# Patient Record
Sex: FEMALE | Race: WHITE | Employment: PART TIME | URBAN - METROPOLITAN AREA
[De-identification: names, ages, dates, MRNs, and addresses within clinical notes are randomized per-mention and may not be internally consistent; named-entity substitution may affect disease eponyms.]

---

## 2017-03-14 PROBLEM — G43.829 MENSTRUAL MIGRAINE WITHOUT STATUS MIGRAINOSUS, NOT INTRACTABLE: Status: ACTIVE | Noted: 2017-03-14

## 2017-03-14 PROBLEM — J30.89 PERENNIAL ALLERGIC RHINITIS: Status: ACTIVE | Noted: 2017-03-14

## 2018-01-03 PROBLEM — F33.9 RECURRENT DEPRESSION (HCC): Status: ACTIVE | Noted: 2018-01-03

## 2018-08-09 PROBLEM — F98.8 ATTENTION DEFICIT DISORDER: Status: ACTIVE | Noted: 2018-08-09

## 2019-05-19 PROBLEM — R53.81 MALAISE AND FATIGUE: Status: ACTIVE | Noted: 2019-05-19

## 2019-05-19 PROBLEM — L70.9 ACNE: Status: ACTIVE | Noted: 2019-05-19

## 2019-05-19 PROBLEM — R53.83 MALAISE AND FATIGUE: Status: ACTIVE | Noted: 2019-05-19

## 2019-05-19 PROBLEM — I83.811 VARICOSE VEINS OF LEG WITH PAIN, RIGHT: Status: ACTIVE | Noted: 2019-05-19

## 2020-12-01 PROBLEM — G43.829 MENSTRUAL MIGRAINE WITHOUT STATUS MIGRAINOSUS, NOT INTRACTABLE: Status: RESOLVED | Noted: 2017-03-14 | Resolved: 2020-12-01

## 2020-12-01 PROBLEM — Z98.891 HISTORY OF C-SECTION: Status: ACTIVE | Noted: 2020-12-01

## 2020-12-01 PROBLEM — J30.89 PERENNIAL ALLERGIC RHINITIS: Status: RESOLVED | Noted: 2017-03-14 | Resolved: 2020-12-01

## 2020-12-01 PROBLEM — F98.8 ATTENTION DEFICIT DISORDER: Status: RESOLVED | Noted: 2018-08-09 | Resolved: 2020-12-01

## 2020-12-01 PROBLEM — L70.9 ACNE: Status: RESOLVED | Noted: 2019-05-19 | Resolved: 2020-12-01

## 2020-12-01 PROBLEM — F33.9 RECURRENT DEPRESSION (HCC): Status: RESOLVED | Noted: 2018-01-03 | Resolved: 2020-12-01

## 2020-12-01 PROBLEM — Z34.90 PREGNANCY: Status: ACTIVE | Noted: 2020-12-01

## 2020-12-01 PROBLEM — Z86.59 HISTORY OF DEPRESSION: Status: ACTIVE | Noted: 2020-12-01

## 2020-12-01 PROBLEM — R53.83 MALAISE AND FATIGUE: Status: RESOLVED | Noted: 2019-05-19 | Resolved: 2020-12-01

## 2020-12-01 PROBLEM — O10.919 CHRONIC HYPERTENSION AFFECTING PREGNANCY: Status: ACTIVE | Noted: 2020-12-01

## 2020-12-01 PROBLEM — I83.811 VARICOSE VEINS OF LEG WITH PAIN, RIGHT: Status: RESOLVED | Noted: 2019-05-19 | Resolved: 2020-12-01

## 2020-12-01 PROBLEM — R53.81 MALAISE AND FATIGUE: Status: RESOLVED | Noted: 2019-05-19 | Resolved: 2020-12-01

## 2020-12-02 PROBLEM — Z87.59 HISTORY OF CHOLESTASIS DURING PREGNANCY: Status: ACTIVE | Noted: 2020-12-02

## 2020-12-02 PROBLEM — Z87.19 HISTORY OF CHOLESTASIS DURING PREGNANCY: Status: ACTIVE | Noted: 2020-12-02

## 2021-02-15 PROBLEM — O34.219 PREVIOUS CESAREAN SECTION COMPLICATING PREGNANCY: Status: ACTIVE | Noted: 2020-12-01

## 2021-02-15 PROBLEM — O99.342 DEPRESSION AFFECTING PREGNANCY IN SECOND TRIMESTER, ANTEPARTUM: Status: ACTIVE | Noted: 2020-12-01

## 2021-02-15 PROBLEM — O09.92 HIGH-RISK PREGNANCY IN SECOND TRIMESTER: Status: ACTIVE | Noted: 2020-12-01

## 2021-02-15 PROBLEM — O26.612 CHOLESTASIS DURING PREGNANCY IN SECOND TRIMESTER: Status: ACTIVE | Noted: 2020-12-02

## 2021-02-15 PROBLEM — O10.012 CHRONIC BENIGN ESSENTIAL HYPERTENSION IN SECOND TRIMESTER: Status: ACTIVE | Noted: 2020-12-01

## 2021-02-15 PROBLEM — K83.1 CHOLESTASIS DURING PREGNANCY IN SECOND TRIMESTER: Status: ACTIVE | Noted: 2020-12-02

## 2021-02-15 PROBLEM — F32.A DEPRESSION AFFECTING PREGNANCY IN SECOND TRIMESTER, ANTEPARTUM: Status: ACTIVE | Noted: 2020-12-01

## 2021-03-24 PROBLEM — Z3A.25 25 WEEKS GESTATION OF PREGNANCY: Status: ACTIVE | Noted: 2021-03-24

## 2021-03-24 PROBLEM — O99.212 OBESITY AFFECTING PREGNANCY IN SECOND TRIMESTER: Status: ACTIVE | Noted: 2021-03-24

## 2021-03-25 PROBLEM — O09.892 HISTORY OF PRETERM DELIVERY, CURRENTLY PREGNANT IN SECOND TRIMESTER: Status: ACTIVE | Noted: 2021-03-25

## 2021-03-25 PROBLEM — Z87.59 HISTORY OF TWIN PREGNANCY IN PRIOR PREGNANCY: Status: ACTIVE | Noted: 2021-03-25

## 2021-04-27 PROBLEM — Z3A.25 25 WEEKS GESTATION OF PREGNANCY: Status: RESOLVED | Noted: 2021-03-24 | Resolved: 2021-04-27

## 2021-04-29 PROBLEM — O99.019 ANEMIA IN PREGNANCY: Status: ACTIVE | Noted: 2021-04-29

## 2021-06-08 ENCOUNTER — HOSPITAL ENCOUNTER (INPATIENT)
Age: 31
LOS: 11 days | Discharge: HOME | End: 2021-06-20
Attending: OBSTETRICS & GYNECOLOGY | Admitting: OBSTETRICS & GYNECOLOGY
Payer: COMMERCIAL

## 2021-06-08 DIAGNOSIS — O14.93 PRE-ECLAMPSIA IN THIRD TRIMESTER: ICD-10-CM

## 2021-06-08 DIAGNOSIS — O10.012 CHRONIC BENIGN ESSENTIAL HYPERTENSION IN SECOND TRIMESTER: ICD-10-CM

## 2021-06-08 DIAGNOSIS — O34.219 PREVIOUS CESAREAN SECTION COMPLICATING PREGNANCY: ICD-10-CM

## 2021-06-08 PROBLEM — O14.90 PREECLAMPSIA: Status: ACTIVE | Noted: 2021-06-08

## 2021-06-08 LAB
ALBUMIN SERPL-MCNC: 2.4 G/DL (ref 3.5–5)
ALBUMIN/GLOB SERPL: 0.5 {RATIO} (ref 1.2–3.5)
ALP SERPL-CCNC: 138 U/L (ref 50–136)
ALT SERPL-CCNC: 21 U/L (ref 12–65)
ANION GAP SERPL CALC-SCNC: 7 MMOL/L (ref 7–16)
AST SERPL-CCNC: 17 U/L (ref 15–37)
BILIRUB SERPL-MCNC: 0.3 MG/DL (ref 0.2–1.1)
BUN SERPL-MCNC: 13 MG/DL (ref 6–23)
CALCIUM SERPL-MCNC: 8.9 MG/DL (ref 8.3–10.4)
CHLORIDE SERPL-SCNC: 107 MMOL/L (ref 98–107)
CO2 SERPL-SCNC: 22 MMOL/L (ref 21–32)
CREAT SERPL-MCNC: 0.78 MG/DL (ref 0.6–1)
CREAT UR-MCNC: 171 MG/DL
ERYTHROCYTE [DISTWIDTH] IN BLOOD BY AUTOMATED COUNT: 15.6 % (ref 11.9–14.6)
GLOBULIN SER CALC-MCNC: 4.4 G/DL (ref 2.3–3.5)
GLUCOSE SERPL-MCNC: 88 MG/DL (ref 65–100)
HCT VFR BLD AUTO: 26.4 % (ref 35.8–46.3)
HGB BLD-MCNC: 7.8 G/DL (ref 11.7–15.4)
LDH SERPL L TO P-CCNC: 197 U/L (ref 100–190)
MCH RBC QN AUTO: 22.9 PG (ref 26.1–32.9)
MCHC RBC AUTO-ENTMCNC: 29.5 G/DL (ref 31.4–35)
MCV RBC AUTO: 77.6 FL (ref 79.6–97.8)
NRBC # BLD: 0.02 K/UL (ref 0–0.2)
PLATELET # BLD AUTO: 256 K/UL (ref 150–450)
PMV BLD AUTO: 11.4 FL (ref 9.4–12.3)
POTASSIUM SERPL-SCNC: 4.3 MMOL/L (ref 3.5–5.1)
PROT SERPL-MCNC: 6.8 G/DL (ref 6.3–8.2)
PROT UR-MCNC: 64 MG/DL
PROT/CREAT UR-RTO: 0.4
RBC # BLD AUTO: 3.4 M/UL (ref 4.05–5.2)
SODIUM SERPL-SCNC: 136 MMOL/L (ref 136–145)
URATE SERPL-MCNC: 5.8 MG/DL (ref 2.6–6)
WBC # BLD AUTO: 12 K/UL (ref 4.3–11.1)

## 2021-06-08 PROCEDURE — 74011250637 HC RX REV CODE- 250/637: Performed by: OBSTETRICS & GYNECOLOGY

## 2021-06-08 PROCEDURE — 83615 LACTATE (LD) (LDH) ENZYME: CPT

## 2021-06-08 PROCEDURE — 96374 THER/PROPH/DIAG INJ IV PUSH: CPT

## 2021-06-08 PROCEDURE — 84156 ASSAY OF PROTEIN URINE: CPT

## 2021-06-08 PROCEDURE — 99219 PR INITIAL OBSERVATION CARE/DAY 50 MINUTES: CPT | Performed by: OBSTETRICS & GYNECOLOGY

## 2021-06-08 PROCEDURE — 80053 COMPREHEN METABOLIC PANEL: CPT

## 2021-06-08 PROCEDURE — 99218 HC RM OBSERVATION: CPT

## 2021-06-08 PROCEDURE — 82570 ASSAY OF URINE CREATININE: CPT

## 2021-06-08 PROCEDURE — 74011000258 HC RX REV CODE- 258: Performed by: OBSTETRICS & GYNECOLOGY

## 2021-06-08 PROCEDURE — 96372 THER/PROPH/DIAG INJ SC/IM: CPT

## 2021-06-08 PROCEDURE — 74011250636 HC RX REV CODE- 250/636: Performed by: OBSTETRICS & GYNECOLOGY

## 2021-06-08 PROCEDURE — 84550 ASSAY OF BLOOD/URIC ACID: CPT

## 2021-06-08 PROCEDURE — 99284 EMERGENCY DEPT VISIT MOD MDM: CPT

## 2021-06-08 PROCEDURE — 85027 COMPLETE CBC AUTOMATED: CPT

## 2021-06-08 PROCEDURE — 59025 FETAL NON-STRESS TEST: CPT

## 2021-06-08 RX ORDER — BETAMETHASONE SODIUM PHOSPHATE AND BETAMETHASONE ACETATE 3; 3 MG/ML; MG/ML
12 INJECTION, SUSPENSION INTRA-ARTICULAR; INTRALESIONAL; INTRAMUSCULAR; SOFT TISSUE EVERY 24 HOURS
Status: COMPLETED | OUTPATIENT
Start: 2021-06-08 | End: 2021-06-09

## 2021-06-08 RX ORDER — LABETALOL 100 MG/1
100 TABLET, FILM COATED ORAL EVERY 8 HOURS
Status: DISCONTINUED | OUTPATIENT
Start: 2021-06-08 | End: 2021-06-16

## 2021-06-08 RX ADMIN — LABETALOL HYDROCHLORIDE 100 MG: 100 TABLET, FILM COATED ORAL at 21:36

## 2021-06-08 RX ADMIN — LABETALOL HYDROCHLORIDE 100 MG: 100 TABLET, FILM COATED ORAL at 14:04

## 2021-06-08 RX ADMIN — BETAMETHASONE SODIUM PHOSPHATE AND BETAMETHASONE ACETATE 12 MG: 3; 3 INJECTION, SUSPENSION INTRA-ARTICULAR; INTRALESIONAL; INTRAMUSCULAR at 14:05

## 2021-06-08 RX ADMIN — IRON SUCROSE 200 MG: 20 INJECTION, SOLUTION INTRAVENOUS at 14:33

## 2021-06-08 NOTE — PROGRESS NOTES
14:04 Medication Given labetaloL (NORMODYNE) tablet 100 mg -  Dose: 100 mg ; Route: Oral ; Scheduled Time: 1400

## 2021-06-08 NOTE — PROGRESS NOTES
Pt to room 439 for triage. Sent from [facility] office for NST, labs and BP's. Assessment begins, EFM and Murdo applied to a soft non tender abdomen and tracing well.

## 2021-06-08 NOTE — PROGRESS NOTES
06/08/21 1230   Fetal Vital Signs   Mode External   Fetal Heart Rate 115   Fetal Activity Present   Variability 6-25 BPM   Decelerations None   Accelerations Yes   RN Reviewed Strip?  Yes   Non Stress Test Reactive   Uterine Activity   Mode External   Frequency (min) 0

## 2021-06-08 NOTE — H&P
History & Physical    Name: Adrien Felix MRN: [id number]  SSN: [id number]    YOB: 1990  Age: 35 y.o. Sex: female      Subjective:     Reason for Admission:  Pregnancy and Chronic Hypertension    History of Present Illness: Ms. Chayo Benavides is a 35 y.o.  female with an estimated gestational age of 26w5d with Estimated Date of Delivery: 21. Patient was seen in the office and had elevated blood pressure 150/90's. She denies pree symptoms. She currently takes labetalol 100mg bid. Had us on  with normally grown baby.       OB History    Para Term  AB Living   2 1 0 1 0 2   SAB TAB Ectopic Molar Multiple Live Births   0 0 0   1 2      # Outcome Date GA Lbr Olaf/2nd Weight Sex Delivery Anes PTL Lv   2 Current            1A  10/28/13 35w6d  6 lb 8.8 oz (2.97 kg) M  LO [facility]   1B  10/28/13 35w6d  5 lb 14.5 oz (2.68 kg) F  LO [facility]     Past Medical History:   Diagnosis Date    ADHD     Anemia     Depression 2016    Hypertension     Polycystic disease, ovaries      delivery     Recurrent UTI      Past Surgical History:   Procedure Laterality Date    HX  SECTION      c section     Social History     Occupational History     Employer: NOT EMPLOYED   Tobacco Use    Smoking status: Never Smoker    Smokeless tobacco: Never Used   Vaping Use    Vaping Use: Never used   Substance and Sexual Activity    Alcohol use: No    Drug use: No    Sexual activity: Yes     Partners: Male     Birth control/protection: None      Family History   Problem Relation Age of Onset    Hypertension Mother     Hypertension Maternal Grandmother     Heart Disease Maternal Grandmother     Hypertension Maternal Grandfather     Heart Disease Maternal Grandfather     No Known Problems Father        Allergies   Allergen Reactions    Ceclor [Cefaclor] Unable to Obtain    Sulfa (Sulfonamide Antibiotics) Unknown (comments)     Prior to Admission medications    Medication Sig Start Date End Date Taking? Authorizing Provider   labetaloL (NORMODYNE) 100 mg tablet TAKE 1 TABLET BY MOUTH TWICE A DAY 21  Yes Gerald Gaffney MD   fluconazole (Diflucan) 150 mg tablet 1 tablet po now and 1 tablet po in 3-4 days 21   Stella Ann,    buPROPion SR (Wellbutrin SR) 150 mg SR tablet Take  by mouth two (2) times a day. Provider, Historical   hydrocortisone (ANUSOL-HC) 2.5 % rectal cream Insert  into rectum four (4) times daily. 21   Xavier Joshua MD   ferrous sulfate (Iron) 325 mg (65 mg iron) tablet Take  by mouth Daily (before breakfast). Provider, Historical   CALCIUM PO Take  by mouth. Provider, Historical   cholecalciferol, vitamin D3, (Vitamin D3) 50 mcg (2,000 unit) tab Take  by mouth. Provider, Historical   calcium carbonate (TUMS) 200 mg calcium (500 mg) chew Take 1 Tab by mouth daily. Provider, Historical   prenatal vit,darrell 74/iron/folic (PRENATAL VITAMIN 1+1 PO) Take  by mouth. Provider, Historical        Review of Systems:  A comprehensive review of systems was negative except for that written in the History of Present Illness. Objective:     Vitals:    Vitals:    21 1218 21 1232 21 1247 21 1303   BP: (!) 158/93 (!) 142/76 138/76 133/69   Pulse: 86 82 86 82   Resp:       Temp:       Height:          Temp (24hrs), Av.1 °F (36.7 °C), Min:98.1 °F (36.7 °C), Max:98.1 °F (36.7 °C)    BP  Min: 133/69  Max: 158/93     Physical Exam:  General: well nourished well developed female in nad   Heart rrr  Lungs cta b&s   Abdomen soft nontender, no ruq pain  Extremity:  +2 tibial edema.   dtr +1/ +4  Fetal Heart Rate:  Reactive       Lab/Data Review:  Recent Results (from the past 24 hour(s))   AMB POC OB URINE DIP    Collection Time: 21  9:31 AM   Result Value Ref Range    Glucose (UA POC) Negative Negative    Protein (UA POC) 1+ Negative   CBC W/O DIFF    Collection Time: 06/08/21 12:08 PM   Result Value Ref Range    WBC 12.0 (H) 4.3 - 11.1 K/uL    RBC 3.40 (L) 4.05 - 5.2 M/uL    HGB 7.8 (L) 11.7 - 15.4 g/dL    HCT 26.4 (L) 35.8 - 46.3 %    MCV 77.6 (L) 79.6 - 97.8 FL    MCH 22.9 (L) 26.1 - 32.9 PG    MCHC 29.5 (L) 31.4 - 35.0 g/dL    RDW 15.6 (H) 11.9 - 14.6 %    PLATELET 941 295 - 461 K/uL    MPV 11.4 9.4 - 12.3 FL    ABSOLUTE NRBC 0.02 0.0 - 0.2 K/uL   METABOLIC PANEL, COMPREHENSIVE    Collection Time: 06/08/21 12:08 PM   Result Value Ref Range    Sodium 136 136 - 145 mmol/L    Potassium 4.3 3.5 - 5.1 mmol/L    Chloride 107 98 - 107 mmol/L    CO2 22 21 - 32 mmol/L    Anion gap 7 7 - 16 mmol/L    Glucose 88 65 - 100 mg/dL    BUN 13 6 - 23 MG/DL    Creatinine 0.78 0.6 - 1.0 MG/DL    GFR est AA >60 >60 ml/min/1.73m2    GFR est non-AA >60 >60 ml/min/1.73m2    Calcium 8.9 8.3 - 10.4 MG/DL    Bilirubin, total 0.3 0.2 - 1.1 MG/DL    ALT (SGPT) 21 12 - 65 U/L    AST (SGOT) 17 15 - 37 U/L    Alk. phosphatase 138 (H) 50 - 136 U/L    Protein, total 6.8 6.3 - 8.2 g/dL    Albumin 2.4 (L) 3.5 - 5.0 g/dL    Globulin 4.4 (H) 2.3 - 3.5 g/dL    A-G Ratio 0.5 (L) 1.2 - 3.5     URIC ACID    Collection Time: 06/08/21 12:08 PM   Result Value Ref Range    Uric acid 5.8 2.6 - 6.0 MG/DL   LD    Collection Time: 06/08/21 12:08 PM   Result Value Ref Range     (H) 100 - 190 U/L   PROTEIN/CREATININE RATIO, URINE    Collection Time: 06/08/21 12:08 PM   Result Value Ref Range    Protein, urine random 64 mg/dL    Creatinine, urine 171.00 mg/dL    Protein/Creat. urine Ratio 0.4         Assessment and Plan:     Principal Problem:    Chronic benign essential hypertension in second trimester (12/1/2020)      Overview: Taking Labetalol 100 mg bid. Baseline labs-      24 hour urine-            2/16/2021 at [facility]: Takes Labetalol 100mg BID. /88. Denies PreE       symptoms      2/18/21-Pre-E labs P/C ratio-208-per Alt collect 24 hr urine at next       visit.       3/25/2021 at [facility]: Appropriate fetal growth, Normal anatomy/echo; AC 77%,       Overall 65%, SHAYY 24.5 cm (DVP 8 cm).      21-Pre-E labs P/C ratio-204       No f/u with MFM, refer back PRN. · Growth at 32 weeks with primary OB. · Start twice weekly testing with primary OB at 32 weeks. · Get baseline preeclamptic labs in your office at next appointment:        (CBC, CMP, LDH, Uric Acid); also do P/C ratio (if elevated, need to then       confirm with 24h urine) or 24 hour urine (for total protein and creatinine       clearance). Repeat for elevated BP or symptoms. · Preeclamptic warnings given      · Treatment of chronic HTN is recommended to maintain blood pressure in       normal to mild range (<160/<110). Increase Labetalol to 200mg BID for       >150/100. · Low dose Aspirin (162 mg daily qhs) is recommended to be started by       12-16 weeks for the prevention of preeclampsia in high risk women; some       benefit seen with starting up to 28 weeks. · Will need serial growth ultrasounds in third trimester, as well as        testing to monitor maternal and fetal well-being             CHTN with now likely superimposed pree. Urine protein  / creatinine ratio 0.4. Per mfm, will admit, steroids, change labetalol to 100mg tid, collect 24 hour urine. Will likely remain inhouse until 37 week delivery. She will be repeat ltcs. Severe anemia - iv iron here 2 doses x 24 hours.

## 2021-06-09 PROBLEM — O14.90 PRE-ECLAMPSIA: Status: ACTIVE | Noted: 2021-06-09

## 2021-06-09 LAB
COLLECT DURATION TIME UR: 24 HR
PROT 24H UR-MRATE: 646 MG/24HR
PROT UR-MCNC: 38 MG/DL
SPECIMEN VOL ?TM UR: 1700 ML

## 2021-06-09 PROCEDURE — 74011250637 HC RX REV CODE- 250/637: Performed by: OBSTETRICS & GYNECOLOGY

## 2021-06-09 PROCEDURE — 74011250636 HC RX REV CODE- 250/636: Performed by: OBSTETRICS & GYNECOLOGY

## 2021-06-09 PROCEDURE — 74011000258 HC RX REV CODE- 258: Performed by: OBSTETRICS & GYNECOLOGY

## 2021-06-09 PROCEDURE — 59025 FETAL NON-STRESS TEST: CPT | Performed by: OBSTETRICS & GYNECOLOGY

## 2021-06-09 PROCEDURE — 99218 HC RM OBSERVATION: CPT

## 2021-06-09 PROCEDURE — 96376 TX/PRO/DX INJ SAME DRUG ADON: CPT

## 2021-06-09 PROCEDURE — 96372 THER/PROPH/DIAG INJ SC/IM: CPT

## 2021-06-09 PROCEDURE — 65270000029 HC RM PRIVATE

## 2021-06-09 PROCEDURE — 99232 SBSQ HOSP IP/OBS MODERATE 35: CPT | Performed by: OBSTETRICS & GYNECOLOGY

## 2021-06-09 RX ORDER — FAMOTIDINE 20 MG/1
20 TABLET, FILM COATED ORAL DAILY
Status: DISCONTINUED | OUTPATIENT
Start: 2021-06-09 | End: 2021-06-09

## 2021-06-09 RX ORDER — ACETAMINOPHEN 325 MG/1
650 TABLET ORAL ONCE
Status: COMPLETED | OUTPATIENT
Start: 2021-06-09 | End: 2021-06-09

## 2021-06-09 RX ORDER — FAMOTIDINE 20 MG/1
20 TABLET, FILM COATED ORAL 2 TIMES DAILY
Status: DISCONTINUED | OUTPATIENT
Start: 2021-06-10 | End: 2021-06-16

## 2021-06-09 RX ORDER — CALCIUM CARBONATE 200(500)MG
400 TABLET,CHEWABLE ORAL AS NEEDED
Status: DISCONTINUED | OUTPATIENT
Start: 2021-06-09 | End: 2021-06-16

## 2021-06-09 RX ORDER — FAMOTIDINE 20 MG/1
TABLET, FILM COATED ORAL
Status: ACTIVE
Start: 2021-06-09 | End: 2021-06-09

## 2021-06-09 RX ADMIN — LABETALOL HYDROCHLORIDE 100 MG: 100 TABLET, FILM COATED ORAL at 22:08

## 2021-06-09 RX ADMIN — BETAMETHASONE SODIUM PHOSPHATE AND BETAMETHASONE ACETATE 12 MG: 3; 3 INJECTION, SUSPENSION INTRA-ARTICULAR; INTRALESIONAL; INTRAMUSCULAR at 13:42

## 2021-06-09 RX ADMIN — IRON SUCROSE 200 MG: 20 INJECTION, SOLUTION INTRAVENOUS at 14:24

## 2021-06-09 RX ADMIN — LABETALOL HYDROCHLORIDE 100 MG: 100 TABLET, FILM COATED ORAL at 13:41

## 2021-06-09 RX ADMIN — ACETAMINOPHEN 650 MG: 325 TABLET ORAL at 10:07

## 2021-06-09 RX ADMIN — FAMOTIDINE 20 MG: 20 TABLET, FILM COATED ORAL at 05:49

## 2021-06-09 RX ADMIN — LABETALOL HYDROCHLORIDE 100 MG: 100 TABLET, FILM COATED ORAL at 05:49

## 2021-06-09 NOTE — PROGRESS NOTES
Spoke to Dr. Ivelisse Lu regarding patient's complaint of headache, one time dose of tylenol ordered.

## 2021-06-09 NOTE — PROGRESS NOTES
Chart reviewed - Patient admitted on 6/8/21 at 35w6d for preeclampsia work-up. DENEEN: 7/7/21. Per chart, patient will remain inpatient until 37 weeks gestation. SW will continue to follow.     SHERIN Zheng-STEPHENIE  [facility]   [phone]

## 2021-06-09 NOTE — CONSULTS
Neonatology Prenatal Consult:    Prenatal Consult was requested by the obstetrician. Obstetrical Note:  Medical record and notes reviewed. Obstetrical Findings: Mother's Date of Admission: 2021 11:15 AM   Age: 35 y.o.  DENEEN: Estimated Date of Delivery: 21  Gestation by dates: 36w0d   Pregnancy:   Membrane status: Membrane Status: Intact      Social History     Socioeconomic History    Marital status:      Spouse name: Not on file    Number of children: Not on file    Years of education: Not on file    Highest education level: Not on file   Occupational History     Employer: NOT EMPLOYED   Tobacco Use    Smoking status: Never Smoker    Smokeless tobacco: Never Used   Vaping Use    Vaping Use: Never used   Substance and Sexual Activity    Alcohol use: No    Drug use: No    Sexual activity: Yes     Partners: Male     Birth control/protection: None   Other Topics Concern     Social Determinants of Health     Financial Resource Strain:     Difficulty of Paying Living Expenses:    Food Insecurity:     Worried About Running Out of Food in the Last Year:     Ran Out of Food in the Last Year:    Transportation Needs:     Lack of Transportation (Medical):      Lack of Transportation (Non-Medical):    Physical Activity:     Days of Exercise per Week:     Minutes of Exercise per Session:    Stress:     Feeling of Stress :    Social Connections:     Frequency of Communication with Friends and Family:     Frequency of Social Gatherings with Friends and Family:     Attends Adventism Services:     Active Member of Clubs or Organizations:     Attends Club or Organization Meetings:     Marital Status:      Current Facility-Administered Medications   Medication Dose Route Frequency    famotidine (PEPCID) tablet 20 mg  20 mg Oral DAILY    labetaloL (NORMODYNE) tablet 100 mg  100 mg Oral Q8H     Patient Active Problem List    Diagnosis Date Noted    Pre-eclampsia 2021  Preeclampsia 2021    Anemia in pregnancy 2021    History of  delivery, currently pregnant in second trimester 2021    History of twin pregnancy in prior pregnancy 2021    Obesity affecting pregnancy in second trimester 2021    BMI 36.0-36.9,adult 2021    History of cholestasis during pregnancy 2020    High-risk pregnancy in second trimester 2020    Previous  section complicating pregnancy     Chronic benign essential hypertension in second trimester 2020    Depression affecting pregnancy in second trimester, antepartum 2020       Lab Results   Component Value Date/Time    ABO/Rh(D) B POS 10/27/2013 10:30 PM    Antibody screen Negative 2020 02:15 PM    Antibody screen, External Negative 2020 12:00 AM    HBsAg, External Negative 2020 12:00 AM    HIV, External Non-Reactive 2020 12:00 AM    Rubella, External Immune 2020 12:00 AM    RPR, External Non-Reactive 2020 12:00 AM    ABO,Rh B positive 2020 12:00 AM        Items below were discussed with the parents:      Neonatology coverage reviewed. Survival is very good to excellent. This improves with advancing gestational age. Expected LOS, dependent on gestational age and maturity skills reviewed.  resuscitation team attendance reviewed. Admissions procedures were explained. Family visitation policy were explained. RDS, at some risk. This risk decreases with advancing gestational age. Management of RDS was reviewed. Sepsis evaluation was explained. Feeding techniques reviewed. Mother plans to provide breast milk, yes. The benefits of breast milk were discussed in detail, and breast milk feedings is strongly recommended. Lactation services were also reviewed. Gut problems, NEC and infections are lessened with breast milk feedings.   Significant IVH and ROP are at a low risk as compared to the extreme  gestation. This risk decreases with advancing gestational age. School readiness and learning problems are at a low but increased risk as compared to the full term gestation. Asthma-like problems later in life is at a low but increased risk as compared to term gestation. An increased risk is found with a family history or with smoking. Significant morbidity or complications are a low occurrence for a \"late \" , but with the potential for an extended hospital stay because of temperature maintenance and poor feeding skills. If male , have discussed potential for circumcision. Risks and benefits explained. Family advised to think carefully about this procedure before consenting. SIDS and safe infant sleep practices were reviewed. Family advised to maintain their flu and pertussis vaccinations. Local or primary pediatrician: to be determined. Recommendations: The state  regionalization guidelines were reviewed. The timing and place of delivery is determined by the obstetrical staff. The subsequent appropriate level of  care is determined by the  staff. It is appropriate for the infant to be receive care here in our  Care Unit, if after evaluation of the  infant, it is determined that greater than 32 weeks gestation and greater than 1500 gm, or that a higher level of care is not required, or with consultation with the level III  C. If infant needs NCU care, then will plan to admit to the neonatology service. Attestation:     Total consultation time was 40 minutes with over 50% of the total time was spent in counseling or coordination of care. This included prognosis, risks and benefits of management (treatment) options, importance of compliance with chosen management (treatment) options, and patient and family education.         Signed: Willian Mayo MD  Today's Date: 2021

## 2021-06-09 NOTE — PROGRESS NOTES
24 hr urine 646 mg protein   Discussed with patient with plan for in hospital with delivery 37 weeks or if severe symptoms. Questions answered.

## 2021-06-09 NOTE — PROGRESS NOTES
High Risk Obstetrics Progress Note    Name: Bharat Camargo MRN: [id number]  SSN: [id number]    YOB: 1990  Age: 35 y.o. Sex: female      Subjective:      LOS: 1 day    Estimated Date of Delivery: 21   Gestational Age Today: 36w0d     Patient admitted for preeclampsia. States she does have very mild headache  and normal fetal movement and does not have abdominal pain  , chest pain, nausea and vomiting, right upper quadrant pain  , shortness of breath, vaginal bleeding , vaginal leaking of fluid  and visual disturbances. Objective:     Vitals:  Blood pressure 131/68, pulse (!) 102, temperature 98.3 °F (36.8 °C), resp. rate 18, height 5' 8\" (1.727 m), last menstrual period 2020. Temp (24hrs), Av.2 °F (36.8 °C), Min:98.1 °F (36.7 °C), Max:98.3 °F (55.1 °C)    Systolic (45QFX), BAP:827 , Min:120 , FXE:983      Diastolic (67ZRJ), CIP:90, Min:68, Max:93       Intake and Output:     Date 21 0700 - 06/10/21 0659   Shift 8505-9827 1366-0341 2772-9557 24 Hour Total   INTAKE   P.O. 1340   1340   Shift Total 1340   1340   OUTPUT   Urine 1600   1600   Shift Total 1600   1600   Weight (kg)           Physical Exam:  Patient without distress.   Abdomen: soft, nontender       Membranes:  Intact    Uterine Activity:  None    Fetal Heart Rate:  Reactive        Labs:   Recent Results (from the past 36 hour(s))   AMB POC OB URINE DIP    Collection Time: 21  9:31 AM   Result Value Ref Range    Glucose (UA POC) Negative Negative    Protein (UA POC) 1+ Negative   CBC W/O DIFF    Collection Time: 21 12:08 PM   Result Value Ref Range    WBC 12.0 (H) 4.3 - 11.1 K/uL    RBC 3.40 (L) 4.05 - 5.2 M/uL    HGB 7.8 (L) 11.7 - 15.4 g/dL    HCT 26.4 (L) 35.8 - 46.3 %    MCV 77.6 (L) 79.6 - 97.8 FL    MCH 22.9 (L) 26.1 - 32.9 PG    MCHC 29.5 (L) 31.4 - 35.0 g/dL    RDW 15.6 (H) 11.9 - 14.6 %    PLATELET 140 679 - 318 K/uL    MPV 11.4 9.4 - 12.3 FL    ABSOLUTE NRBC 0.02 0.0 - 0.2 K/uL   METABOLIC PANEL, COMPREHENSIVE    Collection Time: 06/08/21 12:08 PM   Result Value Ref Range    Sodium 136 136 - 145 mmol/L    Potassium 4.3 3.5 - 5.1 mmol/L    Chloride 107 98 - 107 mmol/L    CO2 22 21 - 32 mmol/L    Anion gap 7 7 - 16 mmol/L    Glucose 88 65 - 100 mg/dL    BUN 13 6 - 23 MG/DL    Creatinine 0.78 0.6 - 1.0 MG/DL    GFR est AA >60 >60 ml/min/1.73m2    GFR est non-AA >60 >60 ml/min/1.73m2    Calcium 8.9 8.3 - 10.4 MG/DL    Bilirubin, total 0.3 0.2 - 1.1 MG/DL    ALT (SGPT) 21 12 - 65 U/L    AST (SGOT) 17 15 - 37 U/L    Alk. phosphatase 138 (H) 50 - 136 U/L    Protein, total 6.8 6.3 - 8.2 g/dL    Albumin 2.4 (L) 3.5 - 5.0 g/dL    Globulin 4.4 (H) 2.3 - 3.5 g/dL    A-G Ratio 0.5 (L) 1.2 - 3.5     URIC ACID    Collection Time: 06/08/21 12:08 PM   Result Value Ref Range    Uric acid 5.8 2.6 - 6.0 MG/DL   LD    Collection Time: 06/08/21 12:08 PM   Result Value Ref Range     (H) 100 - 190 U/L   PROTEIN/CREATININE RATIO, URINE    Collection Time: 06/08/21 12:08 PM   Result Value Ref Range    Protein, urine random 64 mg/dL    Creatinine, urine 171.00 mg/dL    Protein/Creat. urine Ratio 0.4         Assessment and Plan:      Principal Problem:    Chronic benign essential hypertension in second trimester (12/1/2020)      Overview: Taking Labetalol 100 mg bid. Baseline labs-      24 hour urine-            2/16/2021 at [facility]: Takes Labetalol 100mg BID. /88. Denies PreE       symptoms      2/18/21-Pre-E labs P/C ratio-208-per Alt collect 24 hr urine at next       visit. 3/25/2021 at [facility]: Appropriate fetal growth, Normal anatomy/echo; AC 77%,       Overall 65%, SHAYY 24.5 cm (DVP 8 cm).      4/27/21-Pre-E labs P/C ratio-204       No f/u with MFM, refer back PRN. · Growth at 32 weeks with primary OB. · Start twice weekly testing with primary OB at 32 weeks.       · Get baseline preeclamptic labs in your office at next appointment:        (CBC, CMP, LDH, Uric Acid); also do P/C ratio (if elevated, need to then       confirm with 24h urine) or 24 hour urine (for total protein and creatinine       clearance). Repeat for elevated BP or symptoms. · Preeclamptic warnings given      · Treatment of chronic HTN is recommended to maintain blood pressure in       normal to mild range (<160/<110). Increase Labetalol to 200mg BID for       >150/100. · Low dose Aspirin (162 mg daily qhs) is recommended to be started by       12-16 weeks for the prevention of preeclampsia in high risk women; some       benefit seen with starting up to 28 weeks. · Will need serial growth ultrasounds in third trimester, as well as        testing to monitor maternal and fetal well-being          Active Problems:    Preeclampsia (2021)       CHTN with now likely superimposed pree. Urine protein  / creatinine ratio 0.4. Per mfm, steroids, change labetalol to 100mg tid, collect 24 hour urine. Will likely remain inhouse until 37 week delivery. She will be repeat ltcs.    Severe anemia - iv iron here 2 doses x 24 hours

## 2021-06-10 PROCEDURE — 74011250637 HC RX REV CODE- 250/637: Performed by: OBSTETRICS & GYNECOLOGY

## 2021-06-10 PROCEDURE — 65270000029 HC RM PRIVATE

## 2021-06-10 RX ORDER — BUPROPION HYDROCHLORIDE 150 MG/1
150 TABLET, EXTENDED RELEASE ORAL DAILY
Status: DISCONTINUED | OUTPATIENT
Start: 2021-06-10 | End: 2021-06-16

## 2021-06-10 RX ORDER — PRENATAL VIT 96/IRON FUM/FOLIC 27MG-0.8MG
1 TABLET ORAL DAILY
Status: DISCONTINUED | OUTPATIENT
Start: 2021-06-10 | End: 2021-06-16

## 2021-06-10 RX ORDER — ASCORBIC ACID 500 MG
1000 TABLET ORAL DAILY
Status: DISCONTINUED | OUTPATIENT
Start: 2021-06-10 | End: 2021-06-16

## 2021-06-10 RX ORDER — MELATONIN
2000 DAILY
Status: DISCONTINUED | OUTPATIENT
Start: 2021-06-10 | End: 2021-06-16

## 2021-06-10 RX ADMIN — CALCIUM CARBONATE 400 MG: 500 TABLET, CHEWABLE ORAL at 00:00

## 2021-06-10 RX ADMIN — VITAMIN D, TAB 1000IU (100/BT) 2000 UNITS: 25 TAB at 11:13

## 2021-06-10 RX ADMIN — LABETALOL HYDROCHLORIDE 100 MG: 100 TABLET, FILM COATED ORAL at 06:06

## 2021-06-10 RX ADMIN — FAMOTIDINE 20 MG: 20 TABLET ORAL at 18:16

## 2021-06-10 RX ADMIN — OXYCODONE HYDROCHLORIDE AND ACETAMINOPHEN 1000 MG: 500 TABLET ORAL at 11:13

## 2021-06-10 RX ADMIN — BUPROPION HYDROCHLORIDE 150 MG: 150 TABLET, EXTENDED RELEASE ORAL at 11:19

## 2021-06-10 RX ADMIN — LABETALOL HYDROCHLORIDE 100 MG: 100 TABLET, FILM COATED ORAL at 21:37

## 2021-06-10 RX ADMIN — PRENATAL VIT W/ FE FUMARATE-FA TAB 27-0.8 MG 1 TABLET: 27-0.8 TAB at 11:13

## 2021-06-10 RX ADMIN — FAMOTIDINE 20 MG: 20 TABLET ORAL at 09:43

## 2021-06-10 NOTE — PROGRESS NOTES
SW met with patient/ while social distancing w/appropriate PPE. Patient states that she's coping well with unexpected hospitalization although this \"isn't part of the plan. \"  Patient has 8 y/o boy/girl twins at home whom are being cared for by a grandparent. Patient is expecting a boy - she will have c/s in 6 days. Patient states that she's currently on Wellbutrin for \"mild\" anxiety. She's been on this medication for the past 2-3 years, and she feels that her anxiety has been managed well. Patient has not received the Wellbutrin while inpatient - SW spoke with RN who will work on getting this medication for her. Family denied any needs from  at this time. KAELA will continue to follow.     SAMIA Nelson   [phone]

## 2021-06-10 NOTE — ADT AUTH CERT NOTES
[address] [address] 
  
FACILITY NPI : [id number] TAX ID [id number] 
  
[facility] SANTOS FISHER 
SFE 4 ANTEPARTUM 
[address] [facility] [address] [phone] 
  
  
   
Patient Name :Rosalba Bobby  : 1990 (30 yrs) MRN : [id number] 
  
Patient Mailing Address [address] [facility] 
                                        [place]ace [place]mann[place] [41] , [address]      
  
  . 
  
   
Insurance Plan Payor: BLUE CROSS / [place] / Product Type: PPO /  
  
Primary Coverage Subscriber ID : N/A 
  
 
   
Current Patient Class : INPATIENT Admit Date : 2021 
  
REQUESTED LEVEL OF CARE: INPATIENT [101] OBSERVATION [104] Diagnosis : Pre-eclampsia Preeclampsia 
  
ICD10 Code : History of  section [Z98.891] Preeclampsia [O14.90] Pre-eclampsia [O14.90]   
  
Admitting and Attending Info: 
Admitting Provider : Marcos Castro MD   NPI: [id number] Admitting Provider Phone. [phone] Admitting Provider Address: [address] [place] [address] 64 43 
  
Attending Provider Maria L Dobbins MD   [id number] Attending Provider Address:  SAME AS FACILITY Facility Name: [address] [place] [address]           
  
  
  
  
  
   
Patient Demographics Patient Name Neetu Best [address] Legal Sex Female   
1990 Address [address] [place] [address] Phone [phone] (Home) *Preferred*  
[phone] [facility] Account Name Acct ID Class Status Primary Coverage Abdulkadir Cleary [id number] INPATIENT Open BLUE [place]OSS - [place] [employer] [facility]  
  
   
Guarantor Account (for Hospital Account [de-identified]) Name Relation to Pt Service Area Active? Acct Type Kyleigh Best Self [address] Yes Personal/Family Address Phone [address] [facility]  
Leland Finch [phone](E)    
  
   
Coverage Information (for Hospital Account [de-identified]) F/O Payor/Plan Subscriber  Subscriber Sex Precert # [place] [facility] [birth year] M Subscriber Subscriber # Kristy Jones [id number] [facility] # Group Name [id number] Address Phone [address] [facility], [address] [place] [address] Policy Number Status Effective Date Benefits Phone  
- -  - Auth/Cert JACEK   
  
   
Diagnosis Codes Comments Chronic benign essential hypertension in second trimester  ICD-10-CM: O10.012 ICD-9-CM: 642.03   
  
   
Admission Information Arrival Date/Time: 2021 1115 Admit Date/Time: 2021 1115 IP Adm. Date/Time: 2021 1301 Admission Type: Urgent Point of Origin: Physician Or Clinic Office Admit Category:   
Means of Arrival:  Primary Service: Obstetrics Secondary Service: N/A Transfer Source:  Service Area: [facility] Unit: [facility] 4 ANTEPARTUM Admit Provider: Amor Marcelo MD Attending Provider: Tristin Olvera DO Referring Provider:   
Admission Information Attending Provider Admission Dx Admitted on  
Amor Marcelo MD History of  section, Preeclampsia, Pre-eclampsia 21 Service Isolation Code Status OBSTETRICS  Prior Allergies Advance Care Planning Ceclor [Cefaclor], Sulfa (Sulfonamide Antibiotics) Jump to the Activity    
Admission Information Unit/Bed: [facility] 4 ANTEPARTUM Service: OBSTETRICS Admitting provider: Amor Marcelo MD Phone: [phone] Attending provider: Amor Marcelo MD Phone: [phone] PCP: Ricardo Akers MD Phone: [phone] Admission dx: History of  section [Z98.891] Patient class: I Admission type: UR    
Patient Demographics Patient Name Irving Bullock  
[id number] Legal Sex Female   
1990 Address [facility] Orlin Calloway Phone [phone] (Home) *Preferred*  
[phone] [facility]) H&P Notes 
 
 H&P by Tristin Olvera DO at 21 1337 documented on Admission (Current) from 2021 in SFE 4 ANTEPARTUM Author: Sophia Johnson DO Author Type: Physician Filed: 21 2557 Note Status: Addendum Cosign: Cosign Not Required Date of Service: 21 : Sophia Johnson DO (Physician) Prior Versions: 1. Sophia Johnson DO (Physician) at 21 1341 - Signed History & Physical 
  
Name: Lacey Oneal MRN: [id number]  SSN: [id number] YOB: 1990  Age: 35 y.o. Sex: female   
  
Subjective:  
  
Reason for Admission:  Pregnancy and Chronic Hypertension 
  
History of Present Illness: Ms. Jamia Osman is a 35 y.o.  female with an estimated gestational age of 26w5d with Estimated Date of Delivery: 21. Patient was seen in the office and had elevated blood pressure 150/90's. She denies pree symptoms. She currently takes labetalol 100mg bid. Had us on  with normally grown baby.   
  
                 
OB History  Para Term  AB Living 2 1 0 1 0 2 SAB TAB Ectopic Molar Multiple Live Births   
0 0 0   1 2  
   
# Outcome Date GA Lbr Olaf/2nd Weight Sex Delivery Anes PTL Lv  
2 Current                    
1A  10/28/13 35w6d   6 lb 8.8 oz (2.97 kg) M  LO SPINAL AN Y [facility]  
1B  10/28/13 35w6d   5 lb 14.5 oz (2.68 kg) F  LO SPINAL AN Y [place]  
  
    
Past Medical History:  
Diagnosis Date  ADHD    
 Anemia    
 Depression 2016  Hypertension    
 Polycystic disease, ovaries    
  delivery    
 Recurrent UTI    
  
     
Past Surgical History:  
Procedure Laterality Date  HX  SECTION      
  c section  
  
Social History  
  
Occupational History  
    Employer: NOT EMPLOYED Tobacco Use  Smoking status: Never Smoker  Smokeless tobacco: Never Used Vaping Use  Vaping Use: Never used Substance and Sexual Activity  Alcohol use: No  
 Drug use: No  
 Sexual activity: Yes  
    Partners: Male  
    Birth control/protection: None Family History Problem Relation Age of Onset  Hypertension Mother    
 Hypertension Maternal Grandmother    
 Heart Disease Maternal Grandmother    
 Hypertension Maternal Grandfather    
 Heart Disease Maternal Grandfather    
 No Known Problems Father    
  
  
    
Allergies Allergen Reactions  Ceclor [Cefaclor] Unable to [facility]  Sulfa (Sulfonamide Antibiotics) Unknown (comments)  
  
       
Prior to Admission medications Medication Sig Start Date End Date Taking? Authorizing Provider  
labetaloL (NORMODYNE) 100 mg tablet TAKE 1 TABLET BY MOUTH TWICE A DAY 21   Yes Anand Gambino MD  
fluconazole (Diflucan) 150 mg tablet 1 tablet po now and 1 tablet po in 3-4 days 21     AltStella,   
buPROPion SR (Wellbutrin SR) 150 mg SR tablet Take  by mouth two (2) times a day.       Provider, Historical  
hydrocortisone (ANUSOL-HC) 2.5 % rectal cream Insert  into rectum four (4) times daily. 21     Anuradha Allen MD  
ferrous sulfate (Iron) 325 mg (65 mg iron) tablet Take  by mouth Daily (before breakfast).       Provider, Historical  
CALCIUM PO Take  by mouth.       Provider, Historical  
cholecalciferol, vitamin D3, (Vitamin D3) 50 mcg (2,000 unit) tab Take  by mouth.       Provider, Historical  
calcium carbonate (TUMS) 200 mg calcium (500 mg) chew Take 1 Tab by mouth daily.       Provider, Historical  
prenatal vit,darrell 74/iron/folic (PRENATAL VITAMIN 1+1 PO) Take  by mouth.       Provider, Historical  
  
  
Review of Systems: A comprehensive review of systems was negative except for that written in the History of Present Illness.  
  
Objective:  
  
Vitals:   
Vitals:  
  21 1218 21 1232 21 1247 21 1303 BP: (!) 158/93 (!) 142/76 138/76 133/69 Pulse: 86 82 86 82 Resp:          
Temp:          
Height:          
  
Temp (24hrs), Av.1 °F (36.7 °C), Min:98.1 °F (36.7 °C), Max:98.1 °F (36.7 °C) 
  
BP Min: 133/69  Max: 158/93  
  
Physical Exam: 
General: well nourished well developed female in nad Heart rrr 
Lungs cta b&s Abdomen soft nontender, no ruq pain Extremity:  +2 tibial edema. dtr +1/ +4 Fetal Heart Rate:  Reactive    
  
Lab/Data Review: 
     
Recent Results (from the past 24 hour(s)) AMB POC OB URINE DIP  
  Collection Time: 06/08/21  9:31 AM  
Result Value Ref Range  
  Glucose (UA POC) Negative Negative  
  Protein (UA POC) 1+ Negative CBC W/O DIFF  
  Collection Time: 06/08/21 12:08 PM  
Result Value Ref Range  
  WBC 12.0 (H) 4.3 - 11.1 K/uL  
  RBC 3.40 (L) 4.05 - 5.2 M/uL  
  HGB 7.8 (L) 11.7 - 15.4 g/dL  
  HCT 26.4 (L) 35.8 - 46.3 %  
  MCV 77.6 (L) 79.6 - 97.8 FL  
  MCH 22.9 (L) 26.1 - 32.9 PG  
  MCHC 29.5 (L) 31.4 - 35.0 g/dL  
  RDW 15.6 (H) 11.9 - 14.6 %  
  PLATELET 084 819 - 608 K/uL  
  MPV 11.4 9.4 - 12.3 FL  
  ABSOLUTE NRBC 0.02 0.0 - 0.2 K/uL METABOLIC PANEL, COMPREHENSIVE  
  Collection Time: 06/08/21 12:08 PM  
Result Value Ref Range  
  Sodium 136 136 - 145 mmol/L  
  Potassium 4.3 3.5 - 5.1 mmol/L  
  Chloride 107 98 - 107 mmol/L  
  CO2 22 21 - 32 mmol/L  
  Anion gap 7 7 - 16 mmol/L  
  Glucose 88 65 - 100 mg/dL  
  BUN 13 6 - 23 MG/DL  
  Creatinine 0.78 0.6 - 1.0 MG/DL  
  GFR est AA >60 >60 ml/min/1.73m2  
  GFR est non-AA >60 >60 ml/min/1.73m2  
  Calcium 8.9 8.3 - 10.4 MG/DL  
  Bilirubin, total 0.3 0.2 - 1.1 MG/DL  
  ALT (SGPT) 21 12 - 65 U/L  
  AST (SGOT) 17 15 - 37 U/L  
  Alk. phosphatase 138 (H) 50 - 136 U/L  
  Protein, total 6.8 6.3 - 8.2 g/dL  
  Albumin 2.4 (L) 3.5 - 5.0 g/dL  
  Globulin 4.4 (H) 2.3 - 3.5 g/dL  
  A-G Ratio 0.5 (L) 1.2 - 3.5 URIC ACID  
  Collection Time: 06/08/21 12:08 PM  
Result Value Ref Range  
  Uric acid 5.8 2.6 - 6.0 MG/DL  
LD  
  Collection Time: 06/08/21 12:08 PM  
Result Value Ref Range  
   (H) 100 - 190 U/L  
PROTEIN/CREATININE RATIO, URINE  
  Collection Time: 06/08/21 12:08 PM  
Result Value Ref Range   Protein, urine random 64 mg/dL  
  Creatinine, urine 171.00 mg/dL  
  Protein/Creat. urine Ratio 0.4    
  
  
Assessment and Plan:  
  
Principal Problem: 
  Chronic benign essential hypertension in second trimester (2020) Overview: Taking Labetalol 100 mg bid. Baseline labs- 
    24 hour urine- 
     
    2021 at [facility]: Takes Labetalol 100mg BID. /88. Denies PreE  
    symptoms 21-Pre-E labs P/C ratio-208-per Alt collect 24 hr urine at next  
    visit. 3/25/2021 at [facility]: Appropriate fetal growth, Normal anatomy/echo; AC 77%, Overall 65%, SHAYY 24.5 cm (DVP 8 cm). 
    21-Pre-E labs P/C ratio-204  No f/u with MFM, refer back PRN. · Growth at 32 weeks with primary OB. · Start twice weekly testing with primary OB at 32 weeks. · Get baseline preeclamptic labs in your office at next appointment:   
    (CBC, CMP, LDH, Uric Acid); also do P/C ratio (if elevated, need to then  
    confirm with 24h urine) or 24 hour urine (for total protein and creatinine  
    clearance). Repeat for elevated BP or symptoms. · Preeclamptic warnings given · Treatment of chronic HTN is recommended to maintain blood pressure in  
    normal to mild range (<160/<110). Increase Labetalol to 200mg BID for  
    >150/100. · Low dose Aspirin (162 mg daily qhs) is recommended to be started by  
    12-16 weeks for the prevention of preeclampsia in high risk women; some  
    benefit seen with starting up to 28 weeks. · Will need serial growth ultrasounds in third trimester, as well as  
     testing to monitor maternal and fetal well-being 
     
  
  
CHTN with now likely superimposed pree. Urine protein  / creatinine ratio 0.4. Per mfm, will admit, steroids, change labetalol to 100mg tid, collect 24 hour urine. Will likely remain inhouse until 37 week delivery. She will be repeat ltcs. Severe anemia - iv iron here 2 doses x 24 hours.   
  
  
  
   
  
Patient Demographics Patient Name Geetha Cisneros  
[id number] Legal Sex Female   
1990 Address [address]  Phone [phone] (Home) *Preferred*  
[phone] [facility]) CSN:  
[id number] Admit Date: Admit Time Room Bed 2021 11:15  [16] 01 [] Attending Providers Provider Pager From To Anitha Israel DO  21 Xavier Joshua MD  21 Emergency Contact(s) Name Relation Home Work Mobile [address] Spouse [phone] Marichuy Herb Parent [phone] Utilization Reviews 
 
  
Hypertensive Disorders of Pregnancy - Care Day 2 (2021) by Savanah Westbrook RN 
 
  
Review Entered Review Status 2021 13:22 Completed  
  
Criteria Review Care Day: 2 Care Date: 2021 Level of Care: Inpatient Floor Guideline Day 2 Clinical Status   
(X) * Blood pressure normal or adequately controlled 2021 13:22:34 EDT by Orvis Flicker   
  /68   
(X) * No seizure activity identified 2021 13:22:34 EDT by Orvis Flicker   
  none noted ( ) * Laboratory values normal or improved   
(X) * Fetal status acceptable 2021 13:22:34 EDT by Orvis Flicker   
  Fetal Heart Rate:  Reactive   
(X) * Delivery not indicated imminently 2021 13:22:34 EDT by Orvis Flicker   
  Uterine Activity:  None   
( ) * Discharge plans and education understood Activity ( ) * Ambulatory or acceptable for next level of care Routes   
(X) * Oral hydration 2021 13:22:34 EDT by Orvis Flicker   
  Regular diet   
(X) * Oral medications or regimen acceptable for next level of care 2021 13:22:34 EDT by Orvis Flicker   
  Pepcid 20mg po q day Tylenol 650mg po x 1 dose   
(X) * Oral diet or acceptable for next level of care 2021 13:22:34 EDT by Orvis Flicker   
  Regular diet Interventions   
(X) Fetal monitoring, including daily fetal movements 6/9/2021 13:22:34 EDT by Elif Perez   
  Fetal nonstress test 2x/day Medications   
(X) Oral antihypertensives 6/9/2021 13:22:34 EDT by Elif Perez   
  Labetalol 100mg po q 8hrs * Milestone Additional Notes 6/9/21 LOS: 1 day    
Estimated Date of Delivery: 7/7/21 Gestational Age Today: 44w0d   
   
Patient admitted for preeclampsia. States she does have very mild headache  and normal fetal movement and does not have abdominal pain  , chest pain, nausea and vomiting, right upper quadrant pain  , shortness of breath, vaginal bleeding , vaginal leaking of fluid  and visual disturbances. Physical Exam:  
Patient without distress. Abdomen: soft, nontender     
   
Membranes:  Intact  
   
Uterine Activity:  None  
   
Fetal Heart Rate:  Reactive    
    
Assessment and Plan:  
   
Principal Problem:  
  Chronic benign essential hypertension in second trimester (12/1/2020)  
    Overview: Taking Labetalol 100 mg bid.  
    Baseline labs-  
    24 hour urine-  
      
    2/16/2021 at [facility]: Takes Labetalol 100mg BID.  /88.  Denies PreE   
    symptoms  
    2/18/21-Pre-E labs P/C ratio-208-per Alt collect 24 hr urine at next   
    visit.  
    3/25/2021 at [facility]: Appropriate fetal growth, Normal anatomy/echo; AC 77%,   
    Overall 65%, SHAYY 24.5 cm (DVP 8 cm).     4/27/21-Pre-E labs P/C ratio-204  
    o No f/u with MFM, refer back PRN.     · Growth at 32 weeks with primary OB.     · Start twice weekly testing with primary OB at 32 weeks.     · Get baseline preeclamptic labs in your office at next appointment:    
    (CBC, CMP, LDH, Uric Acid); also do P/C ratio (if elevated, need to then   
    confirm with 24h urine) or 24 hour urine (for total protein and creatinine   
    clearance).  Repeat for elevated BP or symptoms.   
    · Preeclamptic warnings given  
    · Treatment of chronic HTN is recommended to maintain blood pressure in   
    normal to mild range (<160/<110). Increase Labetalol to 200mg BID for   
    >150/100.  
    · Low dose Aspirin (162 mg daily qhs) is recommended to be started by   
    12-16 weeks for the prevention of preeclampsia in high risk women; some   
    benefit seen with starting up to 28 weeks.     · Will need serial growth ultrasounds in third trimester, as well as   
     testing to monitor maternal and fetal well-being  
      
   
Active Problems:  
  Preeclampsia (2021)    
   
CHTN with now likely superimposed pree.  Urine protein  / creatinine ratio 0.4. Per mfm, steroids, change labetalol to 100mg tid, collect 24 hour urine. Will likely remain inhouse until 37 week delivery.  She will be repeat ltcs. Severe anemia - iv iron here 2 doses x 24 hours  
  Temp not documented Pulse 100  -  102  -  104 Resp 18 O2 sat not documented; pt on RA No labs or imaging Orders Celestone 12 mg IM q 24hrs Venofer 200mg IV q 24hrs  
  
  
Hypertensive Disorders of Pregnancy - Care Day 1 (2021) by Abraham Irvin RN 
 
  
Review Entered Review Status 2021 09:15 Completed  
  
Criteria Review Care Day: 1 Care Date: 2021 Level of Care: Inpatient Floor Guideline Day 1 Level Of Care   
(X) Obstetric floor Clinical Status ( ) * Clinical Indications met Routes   
(X) Oral hydration, oral or parenteral medications 2021 09:15:55 EDT by Ofeliaita Apt   
  Regular diet (X) Diet as tolerated 2021 09:15:55 EDT by Ree Apt   
  Regular diet Interventions   
(X) Urinalysis, CBC, platelet count, creatinine, transaminases 2021 09:15:55 EDT by Ofeliaita Apt   
  see labs in additional notes (X) Fetal testing 2021 09:15:55 EDT by Lynita Apt   
  Fetal nonstress test 2x/day Medications   
(X) Oral or parenteral antihypertensives 2021 09:15:55 EDT by Lynita Apt   
  Labetalol 100mg po q 8hrs * Milestone Additional Notes Ms. Danis Pitts is a 35 y.o.  female with an estimated gestational age of 26w5d with Estimated Date of Delivery: 21. Patient was seen in the office and had elevated blood pressure 150/90's.  She denies pree symptoms.  She currently takes labetalol 100mg bid.  Had us on  with normally grown baby.    
   
  
OB History  Para Term  AB Living 2 1 0 1 0 2 SAB TAB Ectopic Molar Multiple Live Births   
0 0 0 1 2  
   
# Outcome Date GA Lbr Olaf/2nd Weight Sex Delivery Anes PTL Lv  
2 Current 1A  10/28/13 35w6d 6 lb 8.8 oz (2.97 kg) M  LO [facility] [place]  
1B  10/28/13 35w6d 5 lb 14.5 oz (2.68 kg) F  LO SPINAL AN Y [place]  
   
  
No pertinent medical or surgical history Physical Exam:  
General: well nourished well developed female in nad Heart rrr  
Lungs cta b&s Abdomen soft nontender, no ruq pain Extremity:  +2 tibial edema.  dtr +1/ +4 Fetal Heart Rate:  Reactive     
   
Assessment and Plan:  
   
Principal Problem:  
  Chronic benign essential hypertension in second trimester (2020)  
    Overview: Taking Labetalol 100 mg bid.  
    Baseline labs-  
    24 hour urine-  
      
    2021 at [facility]: Takes Labetalol 100mg BID.  /88.  Denies PreE   
    symptoms  
    21-Pre-E labs P/C ratio-208-per Alt collect 24 hr urine at next   
    visit.  
    3/25/2021 at [facility]: Appropriate fetal growth, Normal anatomy/echo; AC 77%,   
    Overall 65%, SHAYY 24.5 cm (DVP 8 cm).     21-Pre-E labs P/C ratio-204  
    o No f/u with MFM, refer back PRN.     · Growth at 32 weeks with primary OB.     · Start twice weekly testing with primary OB at 32 weeks.   
    · Get baseline preeclamptic labs in your office at next appointment:    
    (CBC, CMP, LDH, Uric Acid); also do P/C ratio (if elevated, need to then   
    confirm with 24h urine) or 24 hour urine (for total protein and creatinine   
    clearance).  Repeat for elevated BP or symptoms.     · Preeclamptic warnings given  
    · Treatment of chronic HTN is recommended to maintain blood pressure in   
    normal to mild range (<160/<110). Increase Labetalol to 200mg BID for   
    >150/100.  
    · Low dose Aspirin (162 mg daily qhs) is recommended to be started by   
    12-16 weeks for the prevention of preeclampsia in high risk women; some   
    benefit seen with starting up to 28 weeks.     · Will need serial growth ultrasounds in third trimester, as well as   
     testing to monitor maternal and fetal well-being  
      
   
   
CHTN with now likely superimposed pree.  Urine protein  / creatinine ratio 0.4. Per mfm, will admit, steroids, change labetalol to 100mg tid, collect 24 hour urine. Will likely remain inhouse until 37 week delivery.  She will be repeat ltcs. Severe anemia - iv iron here 2 doses x 24 hours.    
   
  
VS:  98.3  -  105  -  18  -  158/93  O2 sat not documented on RA Protein (UA POC) 1+ Glucose (UA POC) Negative WBC 12.0 Hgb 7.8 Hct 26.4 Plts 256 Sodium 136 Potassium 4.3 Chloride 107 CO2 22 Anion gap 7 Glucose 88 BUN 13 Creatinine 0.78 Calcium 8.9 Albumin 2.4 (L) Globulin 4.4 (H) A-G Ratio 0.5 (L) ALT 21 AST 17 Alk. phosphatase 138 (H)  
 (H) Uric acid 5.8 Creatinine, urine 171.00 Protein, urine random 64 Protein/Creat. urine Ratio 0.4 No imaging Orders Celestone 12 mg IM q 24hrs Venofer 200mg IVPB q 24hrs  
  
  
Hypertensive Disorders of Pregnancy - Clinical Indications for Admission to Inpatient Care by Aime Cuadra RN 
 
  
Review Entered Review Status 2021 09:13 Completed  
  
Criteria Review Clinical Indications for Admission to Inpatient Care Most Recent : Davian Roberts Most Recent Date: 2021 09:13:40 EDT

## 2021-06-10 NOTE — PROGRESS NOTES
Spoke to Dr Deja England about pt needing here Wellbutrin. DR Ordered along with other home vitamins to start today.

## 2021-06-10 NOTE — PROGRESS NOTES
High Risk Obstetrics Progress Note    Name: Donna Mckeon MRN: [id number]  SSN: [id number]    YOB: 1990  Age: 35 y.o. Sex: female      Subjective:      LOS: 2 days    Estimated Date of Delivery: 21   Gestational Age Today: 43w3d     Patient admitted for chronic hypertension and preeclampsia. States she does have normal fetal movement and does not have abdominal pain  , chest pain, contractions, fever, headache , nausea and vomiting, pelvic pressure, right upper quadrant pain  , shortness of breath, swelling, vaginal bleeding , vaginal leaking of fluid  and visual disturbances. Objective:     Vitals:  Blood pressure 126/87, pulse (!) 101, temperature 98.5 °F (36.9 °C), resp. rate 16, height 5' 8\" (1.727 m), last menstrual period 2020, SpO2 99 %. Temp (24hrs), Av.3 °F (36.8 °C), Min:98 °F (36.7 °C), Max:98.5 °F (26.1 °C)    Systolic (01IUY), JTX:034 , Min:117 , VPU:781      Diastolic (98EVW), ABB:81, Min:56, Max:87       Intake and Output:         Physical Exam:  Patient without distress. Heart: Regular rate and rhythm  Lung: clear to auscultation throughout lung fields, no wheezes, no rales, no rhonchi and normal respiratory effort  Abdomen: soft, nontender  Fundus: soft and non tender  Perineum: blood absent, amniotic fluid absent  Lower Extremities:  - Edema No       Membranes:  Intact    Uterine Activity:  None    Fetal Heart Rate:  Reactive        Labs: No results found for this or any previous visit (from the past 36 hour(s)). Assessment and Plan:      Principal Problem:    Chronic benign essential hypertension in second trimester (2020)      Overview: Taking Labetalol 100 mg bid. Baseline labs-      24 hour urine-            2021 at [facility]: Takes Labetalol 100mg BID. /88. Denies PreE       symptoms      21-Pre-E labs P/C ratio-208-per Alt collect 24 hr urine at next       visit.       3/25/2021 at [facility]: Appropriate fetal growth, Normal anatomy/echo; AC 77%,       Overall 65%, SHAYY 24.5 cm (DVP 8 cm).      21-Pre-E labs P/C ratio-204       No f/u with MFM, refer back PRN. · Growth at 32 weeks with primary OB. · Start twice weekly testing with primary OB at 32 weeks. · Get baseline preeclamptic labs in your office at next appointment:        (CBC, CMP, LDH, Uric Acid); also do P/C ratio (if elevated, need to then       confirm with 24h urine) or 24 hour urine (for total protein and creatinine       clearance). Repeat for elevated BP or symptoms. · Preeclamptic warnings given      · Treatment of chronic HTN is recommended to maintain blood pressure in       normal to mild range (<160/<110). Increase Labetalol to 200mg BID for       >150/100. · Low dose Aspirin (162 mg daily qhs) is recommended to be started by       12-16 weeks for the prevention of preeclampsia in high risk women; some       benefit seen with starting up to 28 weeks. · Will need serial growth ultrasounds in third trimester, as well as        testing to monitor maternal and fetal well-being          Active Problems:    Preeclampsia (2021)      Pre-eclampsia (2021)       Preeclampsia:     Daily Fetal monitoring with Non-stress tests  Deliver for Hemolytic Anemia-Elevated Liver Enzymes- Low Platelet Count syndrome, fetal nonreasurrance or worsening maternal condition. BPP today or tomorrow per MFM.

## 2021-06-11 LAB
ALBUMIN SERPL-MCNC: 2.4 G/DL (ref 3.5–5)
ALBUMIN/GLOB SERPL: 0.6 {RATIO} (ref 1.2–3.5)
ALP SERPL-CCNC: 122 U/L (ref 50–130)
ALT SERPL-CCNC: 22 U/L (ref 12–65)
ANION GAP SERPL CALC-SCNC: 9 MMOL/L (ref 7–16)
AST SERPL-CCNC: 34 U/L (ref 15–37)
BILIRUB SERPL-MCNC: 0.2 MG/DL (ref 0.2–1.1)
BUN SERPL-MCNC: 10 MG/DL (ref 6–23)
CALCIUM SERPL-MCNC: 8.8 MG/DL (ref 8.3–10.4)
CHLORIDE SERPL-SCNC: 107 MMOL/L (ref 98–107)
CO2 SERPL-SCNC: 23 MMOL/L (ref 21–32)
CREAT SERPL-MCNC: 0.81 MG/DL (ref 0.6–1)
ERYTHROCYTE [DISTWIDTH] IN BLOOD BY AUTOMATED COUNT: 16.1 % (ref 11.9–14.6)
GLOBULIN SER CALC-MCNC: 4.2 G/DL (ref 2.3–3.5)
GLUCOSE SERPL-MCNC: 80 MG/DL (ref 65–100)
HCT VFR BLD AUTO: 26.4 % (ref 35.8–46.3)
HGB BLD-MCNC: 8 G/DL (ref 11.7–15.4)
MCH RBC QN AUTO: 23.7 PG (ref 26.1–32.9)
MCHC RBC AUTO-ENTMCNC: 30.3 G/DL (ref 31.4–35)
MCV RBC AUTO: 78.1 FL (ref 79.6–97.8)
NRBC # BLD: 0.14 K/UL (ref 0–0.2)
PLATELET # BLD AUTO: 264 K/UL (ref 150–450)
PMV BLD AUTO: 11.8 FL (ref 9.4–12.3)
POTASSIUM SERPL-SCNC: 4.4 MMOL/L (ref 3.5–5.1)
PROT SERPL-MCNC: 6.6 G/DL (ref 6.3–8.2)
RBC # BLD AUTO: 3.38 M/UL (ref 4.05–5.2)
SODIUM SERPL-SCNC: 139 MMOL/L (ref 136–145)
WBC # BLD AUTO: 13.1 K/UL (ref 4.3–11.1)

## 2021-06-11 PROCEDURE — 85027 COMPLETE CBC AUTOMATED: CPT

## 2021-06-11 PROCEDURE — 74011250637 HC RX REV CODE- 250/637: Performed by: OBSTETRICS & GYNECOLOGY

## 2021-06-11 PROCEDURE — 65270000029 HC RM PRIVATE

## 2021-06-11 PROCEDURE — 99232 SBSQ HOSP IP/OBS MODERATE 35: CPT | Performed by: OBSTETRICS & GYNECOLOGY

## 2021-06-11 PROCEDURE — 36415 COLL VENOUS BLD VENIPUNCTURE: CPT

## 2021-06-11 PROCEDURE — 80053 COMPREHEN METABOLIC PANEL: CPT

## 2021-06-11 PROCEDURE — 59025 FETAL NON-STRESS TEST: CPT

## 2021-06-11 RX ADMIN — VITAMIN D, TAB 1000IU (100/BT) 2000 UNITS: 25 TAB at 09:06

## 2021-06-11 RX ADMIN — LABETALOL HYDROCHLORIDE 100 MG: 100 TABLET, FILM COATED ORAL at 22:20

## 2021-06-11 RX ADMIN — FAMOTIDINE 20 MG: 20 TABLET ORAL at 09:06

## 2021-06-11 RX ADMIN — LABETALOL HYDROCHLORIDE 100 MG: 100 TABLET, FILM COATED ORAL at 14:13

## 2021-06-11 RX ADMIN — PRENATAL VIT W/ FE FUMARATE-FA TAB 27-0.8 MG 1 TABLET: 27-0.8 TAB at 09:06

## 2021-06-11 RX ADMIN — BUPROPION HYDROCHLORIDE 150 MG: 150 TABLET, EXTENDED RELEASE ORAL at 09:06

## 2021-06-11 RX ADMIN — LABETALOL HYDROCHLORIDE 100 MG: 100 TABLET, FILM COATED ORAL at 05:56

## 2021-06-11 RX ADMIN — FAMOTIDINE 20 MG: 20 TABLET ORAL at 17:53

## 2021-06-11 RX ADMIN — OXYCODONE HYDROCHLORIDE AND ACETAMINOPHEN 1000 MG: 500 TABLET ORAL at 09:09

## 2021-06-11 NOTE — PROGRESS NOTES
Ante Partum High Risk Pregnancy Note  Patient: Jorge Wells  MRN: [id number]    Patient admitted for  labor states she does not  have  headache , abdominal pain  , contractions, right upper quadrant pain  , vaginal bleeding , swelling and vaginal leaking of fluid . Vitals: Temp (24hrs), Av.3 °F (36.8 °C), Min:98 °F (36.7 °C), Max:98.9 °F (37.2 °C)     Patient Vitals for the past 24 hrs:   BP   21 0849 (!) 140/72   21 0556 132/70   06/10/21 2052 138/71   06/10/21 1818 129/71   06/10/21 1516 (!) 151/78   06/10/21 0945 139/69       I&O:   No intake/output data recorded.  1901 -  0700  In: 560 [P.O.:560]  Out: 450 [Urine:450]    Exam:  Patient without distress.                Abdomen: soft, non-tender               Fundus: soft and non tender               Fundal Height: 37 cm               Right Upper Quadrant: non-tender               Perineum: No sign of blood or amniotic fluid               Lower Extremities: some mild swelling               Patellar Reflexes: 1+ bilaterally               Clonus: absent               Fetal Monitoring:  No decels   Uterine Activity: None                   NST:  reactive           Labs:   Recent Results (from the past 24 hour(s))   CBC W/O DIFF    Collection Time: 21  6:59 AM   Result Value Ref Range    WBC 13.1 (H) 4.3 - 11.1 K/uL    RBC 3.38 (L) 4.05 - 5.2 M/uL    HGB 8.0 (L) 11.7 - 15.4 g/dL    HCT 26.4 (L) 35.8 - 46.3 %    MCV 78.1 (L) 79.6 - 97.8 FL    MCH 23.7 (L) 26.1 - 32.9 PG    MCHC 30.3 (L) 31.4 - 35.0 g/dL    RDW 16.1 (H) 11.9 - 14.6 %    PLATELET 469 545 - 843 K/uL    MPV 11.8 9.4 - 12.3 FL    ABSOLUTE NRBC 0.14 0.0 - 0.2 K/uL   METABOLIC PANEL, COMPREHENSIVE    Collection Time: 21  6:59 AM   Result Value Ref Range    Sodium 139 136 - 145 mmol/L    Potassium 4.4 3.5 - 5.1 mmol/L    Chloride 107 98 - 107 mmol/L    CO2 23 21 - 32 mmol/L    Anion gap 9 7 - 16 mmol/L    Glucose 80 65 - 100 mg/dL    BUN 10 6 - 23 MG/DL    Creatinine 0.81 0.6 - 1.0 MG/DL    GFR est AA >60 >60 ml/min/1.73m2    GFR est non-AA >60 >60 ml/min/1.73m2    Calcium 8.8 8.3 - 10.4 MG/DL    Bilirubin, total 0.2 0.2 - 1.1 MG/DL    ALT (SGPT) 22 12 - 65 U/L    AST (SGOT) 34 15 - 37 U/L    Alk. phosphatase 122 50 - 130 U/L    Protein, total 6.6 6.3 - 8.2 g/dL    Albumin 2.4 (L) 3.5 - 5.0 g/dL    Globulin 4.2 (H) 2.3 - 3.5 g/dL    A-G Ratio 0.6 (L) 1.2 - 3.5         Assessment and Plan:      Principal Problem:    Chronic benign essential hypertension in second trimester (2020)      Overview: Taking Labetalol 100 mg bid. Baseline labs-      24 hour urine-            2021 at [facility]: Takes Labetalol 100mg BID. /88. Denies PreE       symptoms      21-Pre-E labs P/C ratio-208-per Alt collect 24 hr urine at next       visit. 3/25/2021 at [facility]: Appropriate fetal growth, Normal anatomy/echo; AC 77%,       Overall 65%, SHAYY 24.5 cm (DVP 8 cm).      21-Pre-E labs P/C ratio-204       No f/u with MFM, refer back PRN. · Growth at 32 weeks with primary OB. · Start twice weekly testing with primary OB at 32 weeks. · Get baseline preeclamptic labs in your office at next appointment:        (CBC, CMP, LDH, Uric Acid); also do P/C ratio (if elevated, need to then       confirm with 24h urine) or 24 hour urine (for total protein and creatinine       clearance). Repeat for elevated BP or symptoms. · Preeclamptic warnings given      · Treatment of chronic HTN is recommended to maintain blood pressure in       normal to mild range (<160/<110). Increase Labetalol to 200mg BID for       >150/100. · Low dose Aspirin (162 mg daily qhs) is recommended to be started by       12-16 weeks for the prevention of preeclampsia in high risk women; some       benefit seen with starting up to 28 weeks.        · Will need serial growth ultrasounds in third trimester, as well as        testing to monitor maternal and fetal well-being          Active Problems:    Preeclampsia (6/8/2021)      Pre-eclampsia (6/9/2021)          Preeclampsia:  moderate  Complete 48 hour course of steroids to promote fetal lung maturity. Preeclamptic Labs daily  Daily Fetal monitoring with Non-stress tests  Deliver for Hemolytic Anemia-Elevated Liver Enzymes- Low Platelet Count syndrome, fetal nonreasurrance or worsening maternal condition. Discussed anemia; would like to see hgb >9.0 at time of repeat Csection next Wednesday. Has rec'd Fe infusion. Patient knows to try to be NPO if labor, SROM, bleeding, worsening PIH ensues.

## 2021-06-12 PROCEDURE — 74011250637 HC RX REV CODE- 250/637: Performed by: OBSTETRICS & GYNECOLOGY

## 2021-06-12 PROCEDURE — 99232 SBSQ HOSP IP/OBS MODERATE 35: CPT | Performed by: OBSTETRICS & GYNECOLOGY

## 2021-06-12 PROCEDURE — 59025 FETAL NON-STRESS TEST: CPT

## 2021-06-12 PROCEDURE — 65270000029 HC RM PRIVATE

## 2021-06-12 RX ORDER — ACETAMINOPHEN 500 MG
1000 TABLET ORAL
Status: DISCONTINUED | OUTPATIENT
Start: 2021-06-12 | End: 2021-06-16

## 2021-06-12 RX ADMIN — ACETAMINOPHEN 1000 MG: 500 TABLET, FILM COATED ORAL at 13:59

## 2021-06-12 RX ADMIN — LABETALOL HYDROCHLORIDE 100 MG: 100 TABLET, FILM COATED ORAL at 05:58

## 2021-06-12 RX ADMIN — LABETALOL HYDROCHLORIDE 100 MG: 100 TABLET, FILM COATED ORAL at 21:45

## 2021-06-12 RX ADMIN — FAMOTIDINE 20 MG: 20 TABLET ORAL at 17:57

## 2021-06-12 RX ADMIN — VITAMIN D, TAB 1000IU (100/BT) 2000 UNITS: 25 TAB at 08:44

## 2021-06-12 RX ADMIN — LABETALOL HYDROCHLORIDE 100 MG: 100 TABLET, FILM COATED ORAL at 13:49

## 2021-06-12 RX ADMIN — OXYCODONE HYDROCHLORIDE AND ACETAMINOPHEN 1000 MG: 500 TABLET ORAL at 08:43

## 2021-06-12 RX ADMIN — PRENATAL VIT W/ FE FUMARATE-FA TAB 27-0.8 MG 1 TABLET: 27-0.8 TAB at 08:44

## 2021-06-12 RX ADMIN — FAMOTIDINE 20 MG: 20 TABLET ORAL at 08:44

## 2021-06-12 NOTE — PROGRESS NOTES
Ante Partum High Risk Pregnancy Note  Patient: Sahara Negrete  MRN: [id number]    Patient admitted for chronic hypertension states she does not  have  headache , abdominal pain  , contractions, right upper quadrant pain  , vaginal bleeding , swelling and vaginal leaking of fluid . Vitals: Temp (24hrs), Av.4 °F (36.9 °C), Min:98.4 °F (36.9 °C), Max:98.4 °F (36.9 °C)     Patient Vitals for the past 24 hrs:   BP   21 0837 (!) 144/72   21 0558 133/83   21 2220 137/81   21 1912 138/83   21 1412 (!) 140/69       I&O:   No intake/output data recorded. No intake/output data recorded. Exam:  Patient without distress. Abdomen: soft, non-tender               Fundus: soft and non tender               Fundal Height: 36 cm               Right Upper Quadrant: non-tender               Perineum: No sign of blood or amniotic fluid               Lower Extremities: No               Patellar Reflexes: 1+ bilaterally               Clonus: absent               Fetal Monitoring:  No decels   Uterine Activity: None                   NST:  reactive           Labs: No results found for this or any previous visit (from the past 24 hour(s)). Assessment and Plan:      Principal Problem:    Chronic benign essential hypertension in second trimester (2020)      Overview: Taking Labetalol 100 mg bid. Baseline labs-      24 hour urine-            2021 at [facility]: Takes Labetalol 100mg BID. /88. Denies PreE       symptoms      21-Pre-E labs P/C ratio-208-per Alt collect 24 hr urine at next       visit. 3/25/2021 at [facility]: Appropriate fetal growth, Normal anatomy/echo; AC 77%,       Overall 65%, SHAYY 24.5 cm (DVP 8 cm).      21-Pre-E labs P/C ratio-204       No f/u with MFM, refer back PRN. · Growth at 32 weeks with primary OB. · Start twice weekly testing with primary OB at 32 weeks.       · Get baseline preeclamptic labs in your office at next appointment:        (CBC, CMP, LDH, Uric Acid); also do P/C ratio (if elevated, need to then       confirm with 24h urine) or 24 hour urine (for total protein and creatinine       clearance). Repeat for elevated BP or symptoms. · Preeclamptic warnings given      · Treatment of chronic HTN is recommended to maintain blood pressure in       normal to mild range (<160/<110). Increase Labetalol to 200mg BID for       >150/100. · Low dose Aspirin (162 mg daily qhs) is recommended to be started by       12-16 weeks for the prevention of preeclampsia in high risk women; some       benefit seen with starting up to 28 weeks.        · Will need serial growth ultrasounds in third trimester, as well as        testing to monitor maternal and fetal well-being          Active Problems:    Preeclampsia (2021)      Pre-eclampsia (2021)          Pregnancy-Induced Hypertension:  Continue present management  Bed rest    Plan is to have repeat C section Wednesday, or sooner if clinical condition of baby or mother deteriorates, or if labor/SROM/Bleeding ensues  Last Hgb was 8.0 yesterday; will repeat Tuesday ( day before surgery) and transfuse if lower than 9.0  S/P Fe infusion

## 2021-06-13 PROCEDURE — 74011250637 HC RX REV CODE- 250/637: Performed by: OBSTETRICS & GYNECOLOGY

## 2021-06-13 PROCEDURE — 65270000029 HC RM PRIVATE

## 2021-06-13 PROCEDURE — 99232 SBSQ HOSP IP/OBS MODERATE 35: CPT | Performed by: OBSTETRICS & GYNECOLOGY

## 2021-06-13 RX ADMIN — FAMOTIDINE 20 MG: 20 TABLET ORAL at 17:53

## 2021-06-13 RX ADMIN — VITAMIN D, TAB 1000IU (100/BT) 2000 UNITS: 25 TAB at 09:39

## 2021-06-13 RX ADMIN — LABETALOL HYDROCHLORIDE 100 MG: 100 TABLET, FILM COATED ORAL at 05:38

## 2021-06-13 RX ADMIN — LABETALOL HYDROCHLORIDE 100 MG: 100 TABLET, FILM COATED ORAL at 22:10

## 2021-06-13 RX ADMIN — FAMOTIDINE 20 MG: 20 TABLET ORAL at 09:39

## 2021-06-13 RX ADMIN — BUPROPION HYDROCHLORIDE 150 MG: 150 TABLET, EXTENDED RELEASE ORAL at 09:54

## 2021-06-13 RX ADMIN — LABETALOL HYDROCHLORIDE 100 MG: 100 TABLET, FILM COATED ORAL at 15:20

## 2021-06-13 RX ADMIN — OXYCODONE HYDROCHLORIDE AND ACETAMINOPHEN 1000 MG: 500 TABLET ORAL at 09:38

## 2021-06-13 RX ADMIN — PRENATAL VIT W/ FE FUMARATE-FA TAB 27-0.8 MG 1 TABLET: 27-0.8 TAB at 09:39

## 2021-06-13 NOTE — PROGRESS NOTES
Comprehensive Nutrition Assessment  This assessment was completed remotely. . Patient consent was obtained for remote assessment. Type and Reason for Visit: Initial, RD nutrition re-screen/LOS          Nutrition Assessment:   Nutrition History: 6/13: Unable to obtain. Pt did not answer call to room. Nutrition Background: Admitted for chronic hypertension and preeclampsia. DENEEN 7/7/2021  Daily Update:  Pt here until delivery, planned for 37 weeks. Currently at 39 w4d             Current Nutrition Therapies:  ADULT DIET Regular    Current Intake: No intake data           Anthropometric Measures:  Height: 5' 8\" (172.7 cm)     Estimated Daily Nutrient Needs:   88.6kg (11/2019 per EMR), BMI 29.7 c/w over weight status  EER:  5756-9085 kcal /day (20-25 kcal/kg +452 kcal/d)  EPR:  97 grams protein/day (1.1 grams/kg )      Nutrition Diagnosis:   · Increased nutrient needs related to increased demand for energy/nutrients as evidenced by  (pregnancy)    Nutrition Interventions:   Food and/or Nutrient Delivery: Continue current diet          Goals: Active Goal: Intake to meet estimated needs    Nutrition Monitoring and Evaluation:      Food/Nutrient Intake Outcomes: Food and nutrient intake       Discharge Planning:     Too soon to determine    Jayy Villarreal, [address] [place] [address], [address] [place] [address]

## 2021-06-13 NOTE — PROGRESS NOTES
Ante Partum High Risk Pregnancy Note  Patient: Clay Jenkins  MRN: [id number]    Patient admitted for chronic hypertension states she does not  have  headache , abdominal pain  , contractions, right upper quadrant pain  , vaginal bleeding , swelling and vaginal leaking of fluid . Vitals: Temp (24hrs), Av.6 °F (37 °C), Min:98 °F (36.7 °C), Max:99.5 °F (37.5 °C)     Patient Vitals for the past 24 hrs:   BP   21 0538 139/80   21 2145 (!) 149/67   21 1915 135/74   21 1758 128/68   21 1349 132/86   21 1214 (!) 144/86       I&O:   No intake/output data recorded.  1901 -  0700  In: 1260 [P.O.:1260]  Out: 1850 [Urine:1850]    Exam:  Patient without distress. Abdomen: soft, non-tender               Fundus: soft and non tender               Fundal Height: 36 cm               Right Upper Quadrant: non-tender               Perineum: No sign of blood or amniotic fluid               Lower Extremities: No               Patellar Reflexes: 1+ bilaterally               Clonus: absent               Fetal Monitoring:  Reactive NST   Uterine Activity: None                  NST:  reactive           Labs: No results found for this or any previous visit (from the past 24 hour(s)). Assessment and Plan:      Principal Problem:    Chronic benign essential hypertension in second trimester (2020)      Overview: Taking Labetalol 100 mg bid. Baseline labs-      24 hour urine-            2021 at [facility]: Takes Labetalol 100mg BID. /88. Denies PreE       symptoms      21-Pre-E labs P/C ratio-208-per Alt collect 24 hr urine at next       visit. 3/25/2021 at [facility]: Appropriate fetal growth, Normal anatomy/echo; AC 77%,       Overall 65%, SHAYY 24.5 cm (DVP 8 cm).      21-Pre-E labs P/C ratio-204       No f/u with MFM, refer back PRN. · Growth at 32 weeks with primary OB.       · Start twice weekly testing with primary OB at 32 weeks.      · Get baseline preeclamptic labs in your office at next appointment:        (CBC, CMP, LDH, Uric Acid); also do P/C ratio (if elevated, need to then       confirm with 24h urine) or 24 hour urine (for total protein and creatinine       clearance). Repeat for elevated BP or symptoms. · Preeclamptic warnings given      · Treatment of chronic HTN is recommended to maintain blood pressure in       normal to mild range (<160/<110). Increase Labetalol to 200mg BID for       >150/100. · Low dose Aspirin (162 mg daily qhs) is recommended to be started by       12-16 weeks for the prevention of preeclampsia in high risk women; some       benefit seen with starting up to 28 weeks.        · Will need serial growth ultrasounds in third trimester, as well as        testing to monitor maternal and fetal well-being          Active Problems:    Preeclampsia (2021)      Pre-eclampsia (2021)          Pregnancy-Induced Hypertension:  Continue present management  Prenatal care  Bed rest  Consulted Dr. Baljeet Granado with plan of repeat C section Wednesday morning at 9 am. Sooner if clinically indicated  Check Hgb and PLTS Tuesday am, plan transfusion if Hgb <9.0

## 2021-06-14 PROCEDURE — 74011250637 HC RX REV CODE- 250/637: Performed by: OBSTETRICS & GYNECOLOGY

## 2021-06-14 PROCEDURE — 59025 FETAL NON-STRESS TEST: CPT | Performed by: OBSTETRICS & GYNECOLOGY

## 2021-06-14 PROCEDURE — 59025 FETAL NON-STRESS TEST: CPT

## 2021-06-14 PROCEDURE — 99231 SBSQ HOSP IP/OBS SF/LOW 25: CPT | Performed by: OBSTETRICS & GYNECOLOGY

## 2021-06-14 PROCEDURE — 65270000029 HC RM PRIVATE

## 2021-06-14 RX ADMIN — LABETALOL HYDROCHLORIDE 100 MG: 100 TABLET, FILM COATED ORAL at 21:59

## 2021-06-14 RX ADMIN — OXYCODONE HYDROCHLORIDE AND ACETAMINOPHEN 1000 MG: 500 TABLET ORAL at 08:53

## 2021-06-14 RX ADMIN — LABETALOL HYDROCHLORIDE 100 MG: 100 TABLET, FILM COATED ORAL at 13:51

## 2021-06-14 RX ADMIN — VITAMIN D, TAB 1000IU (100/BT) 2000 UNITS: 25 TAB at 08:53

## 2021-06-14 RX ADMIN — FAMOTIDINE 20 MG: 20 TABLET ORAL at 08:53

## 2021-06-14 RX ADMIN — BUPROPION HYDROCHLORIDE 150 MG: 150 TABLET, EXTENDED RELEASE ORAL at 08:56

## 2021-06-14 RX ADMIN — LABETALOL HYDROCHLORIDE 100 MG: 100 TABLET, FILM COATED ORAL at 05:57

## 2021-06-14 RX ADMIN — FAMOTIDINE 20 MG: 20 TABLET ORAL at 19:33

## 2021-06-14 RX ADMIN — PRENATAL VIT W/ FE FUMARATE-FA TAB 27-0.8 MG 1 TABLET: 27-0.8 TAB at 08:53

## 2021-06-14 NOTE — PROGRESS NOTES
This note also relates to the following rows which could not be included: 
Pulse (Heart Rate) - Cannot attach notes to unvalidated device data BP - Cannot attach notes to unvalidated device data MAP (Monitor) - Cannot attach notes to unvalidated device data 
 
 
 06/13/21 2202 Maternal Vital Signs Temp 98.4 °F (36.9 °C) Temp Source Oral  
Resp Rate 20 Level of Consciousness Alert (0)

## 2021-06-14 NOTE — PROGRESS NOTES
Ante Partum High Risk Pregnancy Note    Patient admitted for preeclampsia states she does not  have  headache , abdominal pain  , contractions and right upper quadrant pain  . LOS:    Vitals: Temp (24hrs), Av.6 °F (37 °C), Min:98.4 °F (36.9 °C), Max:98.8 °F (37.1 °C)     Patient Vitals for the past 24 hrs:   BP   21 0851 118/74   21 0213 131/72   21 2202 (!) 153/78   21 1821 (!) 142/84       I&O:    0701 -  190  In: -   Out: 200 [Urine:200]             1901 -  0700  In: 600 [P.O.:600]  Out: 850 [Urine:850]    Exam:  Patient without distress. Abdomen: soft, non-tender               Fundus: soft and non tender               Fundal Height: 36 cm               Right Upper Quadrant: non-tender               Perineum: No sign of blood or amniotic fluid               Lower Extremities: No                                            Fetal Monitoring:  Cat 1 nst this am   Uterine Activity: None                  NST:  reactive           Lab/Data Review:  CMP: No results found for: NA, K, CL, CO2, AGAP, GLU, BUN, CREA, GFRAA, GFRNA, CA, MG, PHOS, ALB, TBIL, TP, ALB, GLOB, AGRAT, ALT  CBC: No results found for: WBC, HGB, HGBEXT, HCT, HCTEXT, PLT, PLTEXT, HGBEXT, HCTEXT, PLTEXT    Assessment and Plan:      Principal Problem:    Chronic benign essential hypertension in second trimester (2020)      Overview: Taking Labetalol 100 mg bid. Baseline labs-      24 hour urine-            2021 at [facility]: Takes Labetalol 100mg BID. /88. Denies PreE       symptoms      21-Pre-E labs P/C ratio-208-per Alt collect 24 hr urine at next       visit. 3/25/2021 at [facility]: Appropriate fetal growth, Normal anatomy/echo; AC 77%,       Overall 65%, SHAYY 24.5 cm (DVP 8 cm).      21-Pre-E labs P/C ratio-204       No f/u with MFM, refer back PRN. · Growth at 32 weeks with primary OB. · Start twice weekly testing with primary OB at 32 weeks. · Get baseline preeclamptic labs in your office at next appointment:        (CBC, CMP, LDH, Uric Acid); also do P/C ratio (if elevated, need to then       confirm with 24h urine) or 24 hour urine (for total protein and creatinine       clearance). Repeat for elevated BP or symptoms. · Preeclamptic warnings given      · Treatment of chronic HTN is recommended to maintain blood pressure in       normal to mild range (<160/<110). Increase Labetalol to 200mg BID for       >150/100. · Low dose Aspirin (162 mg daily qhs) is recommended to be started by       12-16 weeks for the prevention of preeclampsia in high risk women; some       benefit seen with starting up to 28 weeks.        · Will need serial growth ultrasounds in third trimester, as well as        testing to monitor maternal and fetal well-being          Active Problems:    Preeclampsia (2021)      Pre-eclampsia (2021)          Preeclampsia:  mild  Deliver at 37 on [employer] hg tomorrow per [facility]

## 2021-06-14 NOTE — PROGRESS NOTES
06/14/21 1032   Fetal Vital Signs   Mode External   Fetal Heart Rate 130   Fetal Activity Present   Variability 6-25 BPM   Decelerations None   Accelerations Yes   RN Reviewed Strip?  Yes   Non Stress Test Reactive   Uterine Activity   Mode External   Frequency (min) 0   Uterine Resting Tone Relaxed

## 2021-06-14 NOTE — PROGRESS NOTES
Discussed with pt importance of tracking intake and output. Pt doing great job. She is concerned that perhaps she is not putting out enough urine. encouraged pt to keep drinking water frequently. Pt does report concentrated urine. Pt agrees to keep very accurate lists of intakes and outputs and will evaluate. Pt denies headache or flashes of light. B/p normotensive this evening. Will call if any symptoms occur.

## 2021-06-15 ENCOUNTER — ANESTHESIA EVENT (OUTPATIENT)
Dept: LABOR AND DELIVERY | Age: 31
End: 2021-06-15
Payer: COMMERCIAL

## 2021-06-15 LAB
ERYTHROCYTE [DISTWIDTH] IN BLOOD BY AUTOMATED COUNT: 20.1 % (ref 11.9–14.6)
HCT VFR BLD AUTO: 29 % (ref 35.8–46.3)
HGB BLD-MCNC: 8.7 G/DL (ref 11.7–15.4)
MCH RBC QN AUTO: 23.8 PG (ref 26.1–32.9)
MCHC RBC AUTO-ENTMCNC: 30 G/DL (ref 31.4–35)
MCV RBC AUTO: 79.5 FL (ref 79.6–97.8)
NRBC # BLD: 0.02 K/UL (ref 0–0.2)
PLATELET # BLD AUTO: 225 K/UL (ref 150–450)
PMV BLD AUTO: 12 FL (ref 9.4–12.3)
RBC # BLD AUTO: 3.65 M/UL (ref 4.05–5.2)
WBC # BLD AUTO: 10.7 K/UL (ref 4.3–11.1)

## 2021-06-15 PROCEDURE — 74011250637 HC RX REV CODE- 250/637: Performed by: OBSTETRICS & GYNECOLOGY

## 2021-06-15 PROCEDURE — 85027 COMPLETE CBC AUTOMATED: CPT

## 2021-06-15 PROCEDURE — 65270000029 HC RM PRIVATE

## 2021-06-15 PROCEDURE — 36415 COLL VENOUS BLD VENIPUNCTURE: CPT

## 2021-06-15 PROCEDURE — 59025 FETAL NON-STRESS TEST: CPT

## 2021-06-15 PROCEDURE — 99232 SBSQ HOSP IP/OBS MODERATE 35: CPT | Performed by: OBSTETRICS & GYNECOLOGY

## 2021-06-15 RX ADMIN — PRENATAL VIT W/ FE FUMARATE-FA TAB 27-0.8 MG 1 TABLET: 27-0.8 TAB at 08:54

## 2021-06-15 RX ADMIN — LABETALOL HYDROCHLORIDE 100 MG: 100 TABLET, FILM COATED ORAL at 06:13

## 2021-06-15 RX ADMIN — VITAMIN D, TAB 1000IU (100/BT) 2000 UNITS: 25 TAB at 08:54

## 2021-06-15 RX ADMIN — FAMOTIDINE 20 MG: 20 TABLET ORAL at 17:17

## 2021-06-15 RX ADMIN — LABETALOL HYDROCHLORIDE 100 MG: 100 TABLET, FILM COATED ORAL at 14:06

## 2021-06-15 RX ADMIN — LABETALOL HYDROCHLORIDE 100 MG: 100 TABLET, FILM COATED ORAL at 21:45

## 2021-06-15 RX ADMIN — FAMOTIDINE 20 MG: 20 TABLET ORAL at 08:54

## 2021-06-15 RX ADMIN — OXYCODONE HYDROCHLORIDE AND ACETAMINOPHEN 1000 MG: 500 TABLET ORAL at 08:54

## 2021-06-15 RX ADMIN — BUPROPION HYDROCHLORIDE 150 MG: 150 TABLET, EXTENDED RELEASE ORAL at 09:16

## 2021-06-15 NOTE — PROGRESS NOTES
06/15/21 0614 06/15/21 0630   Maternal Vital Signs   Pulse (Heart Rate) 100 93   BP (!) 170/78 (!) 151/81   MAP (Monitor) 112 108   MAP (Calculated) 109 104     MD updated on BP. Given regular dose of lebetalol. Will continue to monitor the patient.

## 2021-06-15 NOTE — PROGRESS NOTES
SBAR received from [address] [place] [address], patient care assumed. Patient and RN covered POC for this shift with teachback achieved. Denies needs, will call out PRN.

## 2021-06-15 NOTE — PROGRESS NOTES
High Risk Obstetrics Progress Note    Name: Amanda Horner MRN: [id number]  SSN: [id number]    YOB: 1990  Age: 35 y.o. Sex: female      Subjective:      LOS: 7 days    Estimated Date of Delivery: 21   Gestational Age Today: 36w7d     Patient admitted for preeclampsia. States she does have normal fetal movement and does not have abdominal pain  , headache , right upper quadrant pain  , shortness of breath, vaginal bleeding , vaginal leaking of fluid  and visual disturbances. Objective:     Vitals:  Blood pressure 136/65, pulse 93, temperature 98.1 °F (36.7 °C), resp. rate 16, height 5' 8\" (1.727 m), weight 257 lb (116.6 kg), last menstrual period 2020, SpO2 100 %. Temp (24hrs), Av.2 °F (36.8 °C), Min:97 °F (36.1 °C), Max:98.8 °F (41.8 °C)    Systolic (88MEK), YSX:524 , Min:127 , GFM:746      Diastolic (82CCZ), NXT:20, Min:65, Max:81       Intake and Output:         Physical Exam:  Patient without distress. Membranes:  Intact    Uterine Activity:  None    Fetal Heart Rate:  Reactive yesterday        Labs:   Recent Results (from the past 36 hour(s))   CBC W/O DIFF    Collection Time: 06/15/21  6:18 AM   Result Value Ref Range    WBC 10.7 4.3 - 11.1 K/uL    RBC 3.65 (L) 4.05 - 5.2 M/uL    HGB 8.7 (L) 11.7 - 15.4 g/dL    HCT 29.0 (L) 35.8 - 46.3 %    MCV 79.5 (L) 79.6 - 97.8 FL    MCH 23.8 (L) 26.1 - 32.9 PG    MCHC 30.0 (L) 31.4 - 35.0 g/dL    RDW 20.1 (H) 11.9 - 14.6 %    PLATELET 610 212 - 372 K/uL    MPV 12.0 9.4 - 12.3 FL    ABSOLUTE NRBC 0.02 0.0 - 0.2 K/uL       Assessment and Plan:      Principal Problem:    Chronic benign essential hypertension in second trimester (2020)      Overview: Taking Labetalol 100 mg bid. Baseline labs-      24 hour urine-            2021 at [facility]: Takes Labetalol 100mg BID. /88. Denies PreE       symptoms      21-Pre-E labs P/C ratio-208-per Alt collect 24 hr urine at next       visit.       3/25/2021 at [facility]: Appropriate fetal growth, Normal anatomy/echo; AC 77%,       Overall 65%, SHAYY 24.5 cm (DVP 8 cm).      21-Pre-E labs P/C ratio-204       No f/u with MFM, refer back PRN. · Growth at 32 weeks with primary OB. · Start twice weekly testing with primary OB at 32 weeks. · Get baseline preeclamptic labs in your office at next appointment:        (CBC, CMP, LDH, Uric Acid); also do P/C ratio (if elevated, need to then       confirm with 24h urine) or 24 hour urine (for total protein and creatinine       clearance). Repeat for elevated BP or symptoms. · Preeclamptic warnings given      · Treatment of chronic HTN is recommended to maintain blood pressure in       normal to mild range (<160/<110). Increase Labetalol to 200mg BID for       >150/100. · Low dose Aspirin (162 mg daily qhs) is recommended to be started by       12-16 weeks for the prevention of preeclampsia in high risk women; some       benefit seen with starting up to 28 weeks. · Will need serial growth ultrasounds in third trimester, as well as        testing to monitor maternal and fetal well-being          Active Problems:    Preeclampsia (2021)      Pre-eclampsia (2021)       Pre Eclampsia with planned delivery tomorrow at 37 weeks. Anemia improved. Discussed still at risk for needing transfusion and questions answered.

## 2021-06-15 NOTE — PROGRESS NOTES
06/15/21 1221   Fetal Vital Signs   Mode External   Fetal Heart Rate 135   Fetal Activity Present   Variability 6-25 BPM   Decelerations None   Accelerations Yes   RN Reviewed Strip?  Yes   Non Stress Test Reactive   Uterine Activity   Mode External   Frequency (min) 0

## 2021-06-16 ENCOUNTER — ANESTHESIA (OUTPATIENT)
Dept: LABOR AND DELIVERY | Age: 31
End: 2021-06-16
Payer: COMMERCIAL

## 2021-06-16 LAB
BASE EXCESS BLD CALC-SCNC: 0.1 MMOL/L
BASE EXCESS BLD CALC-SCNC: 2.5 MMOL/L
ERYTHROCYTE [DISTWIDTH] IN BLOOD BY AUTOMATED COUNT: 20.8 % (ref 11.9–14.6)
HCO3 BLD-SCNC: 29.6 MMOL/L (ref 22–26)
HCO3 BLDV-SCNC: 25.4 MMOL/L (ref 23–28)
HCT VFR BLD AUTO: 27.6 % (ref 35.8–46.3)
HGB BLD-MCNC: 8.4 G/DL (ref 11.7–15.4)
HISTORY CHECKED?,CKHIST: NORMAL
MCH RBC QN AUTO: 24.3 PG (ref 26.1–32.9)
MCHC RBC AUTO-ENTMCNC: 30.4 G/DL (ref 31.4–35)
MCV RBC AUTO: 79.8 FL (ref 79.6–97.8)
NRBC # BLD: 0 K/UL (ref 0–0.2)
PCO2 BLDCO: 42 MMHG (ref 32–68)
PCO2 BLDCO: 54 MMHG (ref 32–68)
PH BLDCO: 7.35 [PH] (ref 7.15–7.38)
PH BLDCO: 7.39 [PH] (ref 7.15–7.38)
PLATELET # BLD AUTO: 198 K/UL (ref 150–450)
PMV BLD AUTO: 11.5 FL (ref 9.4–12.3)
PO2 BLDCO: 20 MMHG
PO2 BLDCO: 5 MMHG
RBC # BLD AUTO: 3.46 M/UL (ref 4.05–5.2)
SAO2 % BLD: 3.5 % (ref 95–98)
SAO2 % BLDV: 30.1 % (ref 65–88)
SERVICE CMNT-IMP: ABNORMAL
SERVICE CMNT-IMP: ABNORMAL
SPECIMEN TYPE: ABNORMAL
SPECIMEN TYPE: ABNORMAL
WBC # BLD AUTO: 10 K/UL (ref 4.3–11.1)

## 2021-06-16 PROCEDURE — 77030039266 HC ADH SKN EXOFIN S2SG -A: Performed by: OBSTETRICS & GYNECOLOGY

## 2021-06-16 PROCEDURE — 65270000029 HC RM PRIVATE

## 2021-06-16 PROCEDURE — 59514 CESAREAN DELIVERY ONLY: CPT | Performed by: OBSTETRICS & GYNECOLOGY

## 2021-06-16 PROCEDURE — 86901 BLOOD TYPING SEROLOGIC RH(D): CPT

## 2021-06-16 PROCEDURE — 74011250637 HC RX REV CODE- 250/637: Performed by: OBSTETRICS & GYNECOLOGY

## 2021-06-16 PROCEDURE — 75410000003 HC RECOV DEL/VAG/CSECN EA 0.5 HR: Performed by: OBSTETRICS & GYNECOLOGY

## 2021-06-16 PROCEDURE — 74011250636 HC RX REV CODE- 250/636: Performed by: ANESTHESIOLOGY

## 2021-06-16 PROCEDURE — 77030031139 HC SUT VCRL2 J&J -A: Performed by: OBSTETRICS & GYNECOLOGY

## 2021-06-16 PROCEDURE — 77030003665 HC NDL SPN BBMI -A: Performed by: ANESTHESIOLOGY

## 2021-06-16 PROCEDURE — 76010000392 HC C SECN EA ADDL 0.5 HR: Performed by: OBSTETRICS & GYNECOLOGY

## 2021-06-16 PROCEDURE — 77030032490 HC SLV COMPR SCD KNE COVD -B: Performed by: OBSTETRICS & GYNECOLOGY

## 2021-06-16 PROCEDURE — 59510 CESAREAN DELIVERY: CPT | Performed by: OBSTETRICS & GYNECOLOGY

## 2021-06-16 PROCEDURE — 74011250637 HC RX REV CODE- 250/637: Performed by: ANESTHESIOLOGY

## 2021-06-16 PROCEDURE — 2709999900 HC NON-CHARGEABLE SUPPLY

## 2021-06-16 PROCEDURE — 74011000250 HC RX REV CODE- 250: Performed by: ANESTHESIOLOGY

## 2021-06-16 PROCEDURE — 77030002974 HC SUT PLN J&J -A: Performed by: OBSTETRICS & GYNECOLOGY

## 2021-06-16 PROCEDURE — 76010000391 HC C SECN FIRST 1 HR: Performed by: OBSTETRICS & GYNECOLOGY

## 2021-06-16 PROCEDURE — 36415 COLL VENOUS BLD VENIPUNCTURE: CPT

## 2021-06-16 PROCEDURE — 77030002966 HC SUT PDS J&J -A: Performed by: OBSTETRICS & GYNECOLOGY

## 2021-06-16 PROCEDURE — 74011250636 HC RX REV CODE- 250/636: Performed by: NURSE ANESTHETIST, CERTIFIED REGISTERED

## 2021-06-16 PROCEDURE — 77030002888 HC SUT CHRMC J&J -A: Performed by: OBSTETRICS & GYNECOLOGY

## 2021-06-16 PROCEDURE — 86923 COMPATIBILITY TEST ELECTRIC: CPT

## 2021-06-16 PROCEDURE — 82803 BLOOD GASES ANY COMBINATION: CPT

## 2021-06-16 PROCEDURE — 85027 COMPLETE CBC AUTOMATED: CPT

## 2021-06-16 PROCEDURE — 77030034696 HC CATH URETH FOL 2W BARD -A: Performed by: OBSTETRICS & GYNECOLOGY

## 2021-06-16 PROCEDURE — 76060000078 HC EPIDURAL ANESTHESIA: Performed by: OBSTETRICS & GYNECOLOGY

## 2021-06-16 PROCEDURE — 2709999900 HC NON-CHARGEABLE SUPPLY: Performed by: OBSTETRICS & GYNECOLOGY

## 2021-06-16 PROCEDURE — 77030007880 HC KT SPN EPDRL BBMI -B: Performed by: ANESTHESIOLOGY

## 2021-06-16 PROCEDURE — 74011000250 HC RX REV CODE- 250: Performed by: OBSTETRICS & GYNECOLOGY

## 2021-06-16 PROCEDURE — 74011250636 HC RX REV CODE- 250/636: Performed by: OBSTETRICS & GYNECOLOGY

## 2021-06-16 PROCEDURE — 74011000250 HC RX REV CODE- 250: Performed by: NURSE ANESTHETIST, CERTIFIED REGISTERED

## 2021-06-16 RX ORDER — NALOXONE HYDROCHLORIDE 0.4 MG/ML
0.1 INJECTION, SOLUTION INTRAMUSCULAR; INTRAVENOUS; SUBCUTANEOUS
Status: DISCONTINUED | OUTPATIENT
Start: 2021-06-16 | End: 2021-06-17

## 2021-06-16 RX ORDER — SODIUM CHLORIDE 0.9 % (FLUSH) 0.9 %
5-40 SYRINGE (ML) INJECTION AS NEEDED
Status: DISCONTINUED | OUTPATIENT
Start: 2021-06-16 | End: 2021-06-16

## 2021-06-16 RX ORDER — EPHEDRINE SULFATE/0.9% NACL/PF 50 MG/5 ML
SYRINGE (ML) INTRAVENOUS AS NEEDED
Status: DISCONTINUED | OUTPATIENT
Start: 2021-06-16 | End: 2021-06-16 | Stop reason: HOSPADM

## 2021-06-16 RX ORDER — DEXTROSE, SODIUM CHLORIDE, SODIUM LACTATE, POTASSIUM CHLORIDE, AND CALCIUM CHLORIDE 5; .6; .31; .03; .02 G/100ML; G/100ML; G/100ML; G/100ML; G/100ML
125 INJECTION, SOLUTION INTRAVENOUS CONTINUOUS
Status: DISCONTINUED | OUTPATIENT
Start: 2021-06-16 | End: 2021-06-16

## 2021-06-16 RX ORDER — OXYCODONE HYDROCHLORIDE 5 MG/1
10 TABLET ORAL
Status: DISCONTINUED | OUTPATIENT
Start: 2021-06-16 | End: 2021-06-16

## 2021-06-16 RX ORDER — SODIUM CHLORIDE 0.9 % (FLUSH) 0.9 %
5-40 SYRINGE (ML) INJECTION AS NEEDED
Status: DISCONTINUED | OUTPATIENT
Start: 2021-06-16 | End: 2021-06-17

## 2021-06-16 RX ORDER — OXYTOCIN/RINGER'S LACTATE 30/500 ML
10 PLASTIC BAG, INJECTION (ML) INTRAVENOUS AS NEEDED
Status: DISCONTINUED | OUTPATIENT
Start: 2021-06-16 | End: 2021-06-16

## 2021-06-16 RX ORDER — TRISODIUM CITRATE DIHYDRATE AND CITRIC ACID MONOHYDRATE 500; 334 MG/5ML; MG/5ML
30 SOLUTION ORAL
Status: COMPLETED | OUTPATIENT
Start: 2021-06-16 | End: 2021-06-16

## 2021-06-16 RX ORDER — SODIUM CHLORIDE 0.9 % (FLUSH) 0.9 %
5-40 SYRINGE (ML) INJECTION EVERY 8 HOURS
Status: DISCONTINUED | OUTPATIENT
Start: 2021-06-16 | End: 2021-06-17

## 2021-06-16 RX ORDER — PROMETHAZINE HYDROCHLORIDE 25 MG/ML
INJECTION, SOLUTION INTRAMUSCULAR; INTRAVENOUS AS NEEDED
Status: DISCONTINUED | OUTPATIENT
Start: 2021-06-16 | End: 2021-06-16 | Stop reason: HOSPADM

## 2021-06-16 RX ORDER — KETOROLAC TROMETHAMINE 30 MG/ML
INJECTION, SOLUTION INTRAMUSCULAR; INTRAVENOUS AS NEEDED
Status: DISCONTINUED | OUTPATIENT
Start: 2021-06-16 | End: 2021-06-16 | Stop reason: HOSPADM

## 2021-06-16 RX ORDER — SODIUM CHLORIDE, SODIUM LACTATE, POTASSIUM CHLORIDE, CALCIUM CHLORIDE 600; 310; 30; 20 MG/100ML; MG/100ML; MG/100ML; MG/100ML
INJECTION, SOLUTION INTRAVENOUS
Status: DISCONTINUED | OUTPATIENT
Start: 2021-06-16 | End: 2021-06-16 | Stop reason: HOSPADM

## 2021-06-16 RX ORDER — MORPHINE SULFATE 0.5 MG/ML
INJECTION, SOLUTION EPIDURAL; INTRATHECAL; INTRAVENOUS
Status: COMPLETED | OUTPATIENT
Start: 2021-06-16 | End: 2021-06-16

## 2021-06-16 RX ORDER — OXYCODONE HYDROCHLORIDE 5 MG/1
10 TABLET ORAL
Status: DISCONTINUED | OUTPATIENT
Start: 2021-06-16 | End: 2021-06-17

## 2021-06-16 RX ORDER — KETOROLAC TROMETHAMINE 30 MG/ML
30 INJECTION, SOLUTION INTRAMUSCULAR; INTRAVENOUS
Status: DISCONTINUED | OUTPATIENT
Start: 2021-06-16 | End: 2021-06-17

## 2021-06-16 RX ORDER — ONDANSETRON 2 MG/ML
4 INJECTION INTRAMUSCULAR; INTRAVENOUS AS NEEDED
Status: DISCONTINUED | OUTPATIENT
Start: 2021-06-16 | End: 2021-06-16

## 2021-06-16 RX ORDER — MORPHINE SULFATE 10 MG/ML
5 INJECTION, SOLUTION INTRAMUSCULAR; INTRAVENOUS
Status: DISCONTINUED | OUTPATIENT
Start: 2021-06-16 | End: 2021-06-17

## 2021-06-16 RX ORDER — SODIUM CHLORIDE 0.9 % (FLUSH) 0.9 %
5-40 SYRINGE (ML) INJECTION EVERY 8 HOURS
Status: DISCONTINUED | OUTPATIENT
Start: 2021-06-16 | End: 2021-06-16

## 2021-06-16 RX ORDER — SODIUM CHLORIDE, SODIUM LACTATE, POTASSIUM CHLORIDE, CALCIUM CHLORIDE 600; 310; 30; 20 MG/100ML; MG/100ML; MG/100ML; MG/100ML
75 INJECTION, SOLUTION INTRAVENOUS CONTINUOUS
Status: DISCONTINUED | OUTPATIENT
Start: 2021-06-16 | End: 2021-06-17

## 2021-06-16 RX ORDER — SODIUM CHLORIDE, SODIUM LACTATE, POTASSIUM CHLORIDE, CALCIUM CHLORIDE 600; 310; 30; 20 MG/100ML; MG/100ML; MG/100ML; MG/100ML
125 INJECTION, SOLUTION INTRAVENOUS CONTINUOUS
Status: DISCONTINUED | OUTPATIENT
Start: 2021-06-16 | End: 2021-06-16

## 2021-06-16 RX ORDER — CEFAZOLIN SODIUM/WATER 2 G/20 ML
2 SYRINGE (ML) INTRAVENOUS
Status: COMPLETED | OUTPATIENT
Start: 2021-06-16 | End: 2021-06-16

## 2021-06-16 RX ORDER — ONDANSETRON 2 MG/ML
4 INJECTION INTRAMUSCULAR; INTRAVENOUS AS NEEDED
Status: DISCONTINUED | OUTPATIENT
Start: 2021-06-16 | End: 2021-06-17

## 2021-06-16 RX ORDER — NALBUPHINE HYDROCHLORIDE 10 MG/ML
5 INJECTION, SOLUTION INTRAMUSCULAR; INTRAVENOUS; SUBCUTANEOUS
Status: DISCONTINUED | OUTPATIENT
Start: 2021-06-16 | End: 2021-06-16

## 2021-06-16 RX ORDER — MORPHINE SULFATE 0.5 MG/ML
INJECTION, SOLUTION EPIDURAL; INTRATHECAL; INTRAVENOUS AS NEEDED
Status: DISCONTINUED | OUTPATIENT
Start: 2021-06-16 | End: 2021-06-16 | Stop reason: HOSPADM

## 2021-06-16 RX ORDER — MORPHINE SULFATE 2 MG/ML
5 INJECTION, SOLUTION INTRAMUSCULAR; INTRAVENOUS
Status: DISCONTINUED | OUTPATIENT
Start: 2021-06-16 | End: 2021-06-16

## 2021-06-16 RX ORDER — BUPIVACAINE HYDROCHLORIDE 7.5 MG/ML
INJECTION, SOLUTION INTRASPINAL
Status: COMPLETED | OUTPATIENT
Start: 2021-06-16 | End: 2021-06-16

## 2021-06-16 RX ORDER — KETOROLAC TROMETHAMINE 30 MG/ML
30 INJECTION, SOLUTION INTRAMUSCULAR; INTRAVENOUS
Status: DISCONTINUED | OUTPATIENT
Start: 2021-06-16 | End: 2021-06-16

## 2021-06-16 RX ORDER — ONDANSETRON 2 MG/ML
INJECTION INTRAMUSCULAR; INTRAVENOUS AS NEEDED
Status: DISCONTINUED | OUTPATIENT
Start: 2021-06-16 | End: 2021-06-16 | Stop reason: HOSPADM

## 2021-06-16 RX ORDER — SODIUM CHLORIDE 9 MG/ML
250 INJECTION, SOLUTION INTRAVENOUS AS NEEDED
Status: DISCONTINUED | OUTPATIENT
Start: 2021-06-16 | End: 2021-06-16

## 2021-06-16 RX ORDER — DOCUSATE SODIUM 100 MG/1
100 CAPSULE, LIQUID FILLED ORAL 2 TIMES DAILY
Status: DISCONTINUED | OUTPATIENT
Start: 2021-06-16 | End: 2021-06-20 | Stop reason: HOSPADM

## 2021-06-16 RX ORDER — SODIUM CHLORIDE, SODIUM LACTATE, POTASSIUM CHLORIDE, CALCIUM CHLORIDE 600; 310; 30; 20 MG/100ML; MG/100ML; MG/100ML; MG/100ML
75 INJECTION, SOLUTION INTRAVENOUS CONTINUOUS
Status: DISCONTINUED | OUTPATIENT
Start: 2021-06-16 | End: 2021-06-16

## 2021-06-16 RX ORDER — NALOXONE HYDROCHLORIDE 0.4 MG/ML
0.1 INJECTION, SOLUTION INTRAMUSCULAR; INTRAVENOUS; SUBCUTANEOUS
Status: DISCONTINUED | OUTPATIENT
Start: 2021-06-16 | End: 2021-06-16

## 2021-06-16 RX ORDER — OXYTOCIN/RINGER'S LACTATE 30/500 ML
87.3 PLASTIC BAG, INJECTION (ML) INTRAVENOUS AS NEEDED
Status: DISCONTINUED | OUTPATIENT
Start: 2021-06-16 | End: 2021-06-16

## 2021-06-16 RX ORDER — DIPHENHYDRAMINE HCL 25 MG
25 CAPSULE ORAL
Status: DISCONTINUED | OUTPATIENT
Start: 2021-06-16 | End: 2021-06-20 | Stop reason: HOSPADM

## 2021-06-16 RX ADMIN — MORPHINE SULFATE 4850 MCG: 0.5 INJECTION, SOLUTION EPIDURAL; INTRATHECAL; INTRAVENOUS at 10:54

## 2021-06-16 RX ADMIN — BUPIVACAINE HYDROCHLORIDE IN DEXTROSE 12 MG: 7.5 INJECTION, SOLUTION SUBARACHNOID at 10:32

## 2021-06-16 RX ADMIN — OXYCODONE 10 MG: 5 TABLET ORAL at 12:48

## 2021-06-16 RX ADMIN — SODIUM CHLORIDE, SODIUM LACTATE, POTASSIUM CHLORIDE, AND CALCIUM CHLORIDE: 600; 310; 30; 20 INJECTION, SOLUTION INTRAVENOUS at 10:19

## 2021-06-16 RX ADMIN — SODIUM CHLORIDE, SODIUM LACTATE, POTASSIUM CHLORIDE, AND CALCIUM CHLORIDE 125 ML/HR: 600; 310; 30; 20 INJECTION, SOLUTION INTRAVENOUS at 08:18

## 2021-06-16 RX ADMIN — PHENYLEPHRINE HYDROCHLORIDE 50 MCG: 10 INJECTION INTRAVENOUS at 10:40

## 2021-06-16 RX ADMIN — PHENYLEPHRINE HYDROCHLORIDE 50 MCG: 10 INJECTION INTRAVENOUS at 10:38

## 2021-06-16 RX ADMIN — OXYCODONE 10 MG: 5 TABLET ORAL at 19:11

## 2021-06-16 RX ADMIN — PROMETHAZINE HYDROCHLORIDE 6.25 MG: 25 INJECTION INTRAMUSCULAR; INTRAVENOUS at 11:02

## 2021-06-16 RX ADMIN — ONDANSETRON 4 MG: 2 INJECTION INTRAMUSCULAR; INTRAVENOUS at 10:25

## 2021-06-16 RX ADMIN — KETOROLAC TROMETHAMINE 30 MG: 30 INJECTION, SOLUTION INTRAMUSCULAR; INTRAVENOUS at 17:53

## 2021-06-16 RX ADMIN — KETOROLAC TROMETHAMINE 30 MG: 30 INJECTION, SOLUTION INTRAMUSCULAR; INTRAVENOUS at 11:21

## 2021-06-16 RX ADMIN — CEFAZOLIN SODIUM 2 G: 100 INJECTION, POWDER, LYOPHILIZED, FOR SOLUTION INTRAVENOUS at 10:14

## 2021-06-16 RX ADMIN — Medication 10 MG: at 10:40

## 2021-06-16 RX ADMIN — MORPHINE SULFATE 0.15 MG: 0.5 INJECTION, SOLUTION EPIDURAL; INTRATHECAL; INTRAVENOUS at 10:32

## 2021-06-16 RX ADMIN — SODIUM CHLORIDE, SODIUM LACTATE, POTASSIUM CHLORIDE, AND CALCIUM CHLORIDE 75 ML/HR: 600; 310; 30; 20 INJECTION, SOLUTION INTRAVENOUS at 17:56

## 2021-06-16 RX ADMIN — SODIUM CITRATE AND CITRIC ACID MONOHYDRATE 30 ML: 500; 334 SOLUTION ORAL at 10:14

## 2021-06-16 RX ADMIN — LABETALOL HYDROCHLORIDE 100 MG: 100 TABLET, FILM COATED ORAL at 06:08

## 2021-06-16 RX ADMIN — DOCUSATE SODIUM 100 MG: 100 CAPSULE, LIQUID FILLED ORAL at 17:53

## 2021-06-16 NOTE — PROGRESS NOTES
Report received from [facility]. Patient care assumed-bedside reporting completed.

## 2021-06-16 NOTE — PROGRESS NOTES
SBAR IN Report: Mother    Verbal report received from Haley Boyd RN on this patient, who is now being transferred from L & D (unit) for routine progression of care. The patient is wearing a green \"Anesthesia-Duramorph\" band. Report consisted of patient's Situation, Background, Assessment and Recommendations (SBAR).  ID bands were compared with the identification form, and verified with the patient and transferring nurse. Information from the OR Summary, Procedure Summary, Intake/Output and MAR and the [place] [facility] Report was reviewed with the transferring nurse; opportunity for questions and clarification provided.

## 2021-06-16 NOTE — PROCEDURES
Delivery Note    Patient Name: Edson Ramirez  MRN: [id number]    Obstetrician:  Marycarmen Antunez MD    Assistant: Dr Thu Davis    Pre-Delivery Diagnosis: Term pregnancy, Single fetus and Pregnancy complicated by: Gest HTN    Post-Delivery Diagnosis: Male    Intrapartum Event: None    Procedure: Repeat Low Transverse  section    Spinal    Monitor:  Fetal Heart Tones - External and Uterine Contractions - External    Indications for instrumental delivery: elective    Estimated Blood Loss: 800    Presentation: Cephalic    Fetal Description: ortiz    Fetal Position: Right Occiput Posterior    Birth Weight: 8-1    Birth Length: 54    Apgar - One Minute: 8    Apgar - Five Minutes: 9    Umbilical Cord: 3 vessels present and Cord blood sent to lab for type, Rh, and Kaykay' test    Specimens: no           Complications:  none              Attending Attestation: I was present and scrubbed for the entire procedure

## 2021-06-16 NOTE — PROGRESS NOTES
Safety Teaching reviewed:   1. Hand hygiene prior to handling the infant. 2. Use of bulb syringe  3. Bracelets with matching numbers are placed on mother and infant  3. An infant security tag  [facility]) is placed on the infant's ankle and monitored  5. All OB nurses wear pink Employee badges - do not give your baby to anyone without proper identification. 6. Never leave the baby alone in the room. 7. The infant should be placed on their back to sleep. on a firm mattress. No toys should be placed in the crib. (safe sleep video offered to view)  8. Never shake the baby (video offered to view)  9. Infant fall prevention - do not sleep with the baby, and place the baby in the crib while ambulating. 8. Mother and Baby Care booklet given to Mother.

## 2021-06-16 NOTE — ANESTHESIA POSTPROCEDURE EVALUATION
Procedure(s):   SECTION.     spinal    Anesthesia Post Evaluation      Multimodal analgesia: multimodal analgesia not used between 6 hours prior to anesthesia start to PACU discharge  Patient location during evaluation: PACU  Patient participation: complete - patient participated  Level of consciousness: awake  Pain management: adequate  Airway patency: patent  Anesthetic complications: no  Cardiovascular status: acceptable  Respiratory status: acceptable  Hydration status: acceptable  Post anesthesia nausea and vomiting:  none  Final Post Anesthesia Temperature Assessment:  Normothermia (36.0-37.5 degrees C)      INITIAL Post-op Vital signs:   Vitals Value Taken Time   /83 21 1320   Temp 36.1 °C (97 °F) 21 1135   Pulse 81 21 1320   Resp 18 21 1320   SpO2 100 % 21 1320

## 2021-06-16 NOTE — ROUTINE PROCESS
SBAR OUT Report: Mother Verbal report given to Ailyn Esparza RN on this patient, who is now being transferred to MIU for routine progression of care. The patient is wearing a green \"Anesthesia-Duramorph\" band. Report consisted of patient's Situation, Background, Assessment and Recommendations (SBAR).  ID bands were compared with the identification form, and verified with the patient and receiving nurse. Information from the SBAR and the [facility] Report was reviewed with the receiving nurse; opportunity for questions and clarification provided.

## 2021-06-16 NOTE — ANESTHESIA PREPROCEDURE EVALUATION
Anesthetic History               Review of Systems / Medical History  Patient summary reviewed and pertinent labs reviewed    Pulmonary                   Neuro/Psych         Psychiatric history    Comments: ADD  depression Cardiovascular    Hypertension              Exercise tolerance: <4 METS  Comments: PIH   GI/Hepatic/Renal  Within defined limits              Endo/Other        Morbid obesity and anemia     Other Findings            Physical Exam    Airway  Mallampati: II  TM Distance: > 6 cm  Neck ROM: normal range of motion   Mouth opening: Normal     Cardiovascular    Rhythm: regular           Dental  No notable dental hx       Pulmonary                 Abdominal  GI exam deferred       Other Findings            Anesthetic Plan    ASA: 3  Anesthesia type: spinal      Post-op pain plan if not by surgeon: intrathecal opiates    Induction: Intravenous  Anesthetic plan and risks discussed with: Patient

## 2021-06-16 NOTE — ANESTHESIA PROCEDURE NOTES
Spinal Block    Start time: 6/16/2021 10:29 AM  End time: 6/16/2021 10:32 AM  Performed by: Santiago Barney MD  Authorized by: Santiago Barney MD     Pre-procedure:   Indications: primary anesthetic  Preanesthetic Checklist: patient identified, risks and benefits discussed, anesthesia consent, site marked, patient being monitored and timeout performed    Timeout Time: 10:29 EDT          Spinal Block:   Patient Position:  Seated    Prep: chlorhexidine and patient draped      Location:  L2-3  Technique:  Single shot        Needle:   Needle Type:  Pencan  Needle Gauge:  25 G  Attempts:  1      Events: CSF confirmed, no blood with aspiration and no paresthesia        Assessment:  Insertion:  Uncomplicated  Patient tolerance:  Patient tolerated the procedure well with no immediate complications

## 2021-06-16 NOTE — OP NOTES
[place]  OPERATIVE REPORT    Name:  Darryle Lily  MR#:  [id number]  :  1990  ACCOUNT #:  [de-identified]  DATE OF SERVICE:  2021    PREOPERATIVE DIAGNOSIS:  The patient at 40 weeks with gestational hypertension, for repeat  delivery. POSTOPERATIVE DIAGNOSIS:  The patient at 40 weeks with gestational hypertension, for repeat  delivery with operative delivery of 8-pound 1-ounce viable male with Apgars of 8 and 9. PROCEDURE PERFORMED:  Repeat low transverse  section. SURGEON:  Fatmata Ordaz MD    ASSISTANT:  Rosa Elena Jones MD    ANESTHESIA:  spinal.    COMPLICATIONS:  None. SPECIMENS REMOVED:  None. IMPLANTS:  none. ESTIMATED BLOOD LOSS:  800 mL. DRAINS:  Jama. FINDINGS:  Male infant weighing 8 pounds 1 ounce with Apgars of 8 and 9. No complications. PROCEDURE:  After informed consent, the patient was taken to the operating room and given spinal anesthesia. She was prepped and draped in the usual sterile fashion in the dorsal supine position. Time-out was accomplished. A Pfannenstiel skin incision was made with a knife through the skin and subcutaneous tissue to the fascia. This was extended bilaterally with Guerra scissors. The rectus muscles were then divided in the midline and peritoneum was entered with a hemostat and divided superiorly and inferiorly avoiding bowel, bladder, and omentum. The DeLee bladder blade was placed in the lower abdominal cavity, bladder flap was deflected downward and the hysterotomy was scored in a low transverse fashion. Clear fluid was noted, incision was widened and the fetal vertex was delivered with mild fundal pressure. Nuchal cord x1 was reduced easily. Mouth and nares were bulb suctioned. Remainder of the infant was delivered atraumatically and the cord was doubly clamped and cut and baby was given to nursing personnel who awarded Apgars of 8 and 9.   The placenta was manually extracted with trailing membranes, uterus exteriorized and curetted. The hysterotomy was then closed with a full-thickness running locking suture of #1 chromic. Tubes and ovaries were found to be normal, cul-de-sac irrigated, uterus returned to the abdomen. Paracolic gutters were irrigated. Inspection revealed good hemostasis of the peritoneum and the rectus muscles were closed in a vertical fashion, with running suture of 2-0 chromic. A portion of the rectus muscle with diastasis was reapproximated with interrupted sutures of 2-0 chromic. Irrigation carried out, then the fascia was closed with 0 PDS in a running stitch. Subcutaneous tissue was irrigated, made hemostatic, closed with interrupted sutures of 2-0 plain. Skin was then closed with 4-0 Vicryl in a subcuticular stitch and reinforced with Dermabond. Counts were correct x2, the patient went to recovery room in stable condition. Dr Nicole Rosales assistance was required for the c section due to the pre existing condition of scar tissue from prior C section. The assistant extended fascia incision, aided in delivery of infant and in the repair of hysterotomy by suturing angle and doing half of the incision.       Cullen Meaodws MD      [id number]  D:  06/16/2021 14:08  T:  06/16/2021 16:35  JOB #:  [id number]

## 2021-06-16 NOTE — PROGRESS NOTES
H and P update:   No new changes in exam, pt is NPO for repeat C section this morning. We reviewed risks of infection, DVT,damage to bowel/bladder and adhesions. Specifically the increased risk for transfusion as her preop Hgb is 8.4 baby may need transfer to NICU due to prematurity. All questions answered, will proceed soon.

## 2021-06-16 NOTE — LACTATION NOTE
In to see mom and infant for the first time. Experienced mom stated that infant is latching and nursing well. Infant was latched on mom's left breast in the cradle hold and sucking rhythmically. Reviewed the expectations of the first 24 hours. Lactation consultant will follow up tomorrow.

## 2021-06-16 NOTE — LACTATION NOTE

## 2021-06-17 LAB
BASOPHILS # BLD: 0 K/UL (ref 0–0.2)
BASOPHILS NFR BLD: 0 % (ref 0–2)
DIFFERENTIAL METHOD BLD: ABNORMAL
EOSINOPHIL # BLD: 0.1 K/UL (ref 0–0.8)
EOSINOPHIL NFR BLD: 1 % (ref 0.5–7.8)
ERYTHROCYTE [DISTWIDTH] IN BLOOD BY AUTOMATED COUNT: 21.2 % (ref 11.9–14.6)
HCT VFR BLD AUTO: 23.9 % (ref 35.8–46.3)
HGB BLD-MCNC: 7.2 G/DL (ref 11.7–15.4)
IMM GRANULOCYTES # BLD AUTO: 0.2 K/UL (ref 0–0.5)
IMM GRANULOCYTES NFR BLD AUTO: 2 % (ref 0–5)
LYMPHOCYTES # BLD: 1.7 K/UL (ref 0.5–4.6)
LYMPHOCYTES NFR BLD: 17 % (ref 13–44)
MCH RBC QN AUTO: 24.3 PG (ref 26.1–32.9)
MCHC RBC AUTO-ENTMCNC: 30.1 G/DL (ref 31.4–35)
MCV RBC AUTO: 80.7 FL (ref 79.6–97.8)
MONOCYTES # BLD: 0.9 K/UL (ref 0.1–1.3)
MONOCYTES NFR BLD: 8 % (ref 4–12)
NEUTS SEG # BLD: 7.5 K/UL (ref 1.7–8.2)
NEUTS SEG NFR BLD: 72 % (ref 43–78)
NRBC # BLD: 0 K/UL (ref 0–0.2)
PLATELET # BLD AUTO: 156 K/UL (ref 150–450)
PMV BLD AUTO: 11.3 FL (ref 9.4–12.3)
RBC # BLD AUTO: 2.96 M/UL (ref 4.05–5.2)
WBC # BLD AUTO: 10.4 K/UL (ref 4.3–11.1)

## 2021-06-17 PROCEDURE — 74011250637 HC RX REV CODE- 250/637: Performed by: OBSTETRICS & GYNECOLOGY

## 2021-06-17 PROCEDURE — 85025 COMPLETE CBC W/AUTO DIFF WBC: CPT

## 2021-06-17 PROCEDURE — 2709999900 HC NON-CHARGEABLE SUPPLY

## 2021-06-17 PROCEDURE — 36415 COLL VENOUS BLD VENIPUNCTURE: CPT

## 2021-06-17 PROCEDURE — 65270000029 HC RM PRIVATE

## 2021-06-17 PROCEDURE — 74011250637 HC RX REV CODE- 250/637: Performed by: ANESTHESIOLOGY

## 2021-06-17 PROCEDURE — 74011250636 HC RX REV CODE- 250/636: Performed by: ANESTHESIOLOGY

## 2021-06-17 RX ORDER — SODIUM CHLORIDE 0.9 % (FLUSH) 0.9 %
5-40 SYRINGE (ML) INJECTION AS NEEDED
Status: DISCONTINUED | OUTPATIENT
Start: 2021-06-17 | End: 2021-06-17

## 2021-06-17 RX ORDER — SODIUM CHLORIDE 0.9 % (FLUSH) 0.9 %
5-40 SYRINGE (ML) INJECTION EVERY 8 HOURS
Status: DISCONTINUED | OUTPATIENT
Start: 2021-06-17 | End: 2021-06-17

## 2021-06-17 RX ORDER — OXYCODONE HYDROCHLORIDE 5 MG/1
10 TABLET ORAL
Status: DISCONTINUED | OUTPATIENT
Start: 2021-06-17 | End: 2021-06-20 | Stop reason: HOSPADM

## 2021-06-17 RX ORDER — OXYCODONE AND ACETAMINOPHEN 7.5; 325 MG/1; MG/1
1 TABLET ORAL
Status: DISCONTINUED | OUTPATIENT
Start: 2021-06-17 | End: 2021-06-17

## 2021-06-17 RX ORDER — NALOXONE HYDROCHLORIDE 0.4 MG/ML
0.4 INJECTION, SOLUTION INTRAMUSCULAR; INTRAVENOUS; SUBCUTANEOUS AS NEEDED
Status: DISCONTINUED | OUTPATIENT
Start: 2021-06-17 | End: 2021-06-20 | Stop reason: HOSPADM

## 2021-06-17 RX ORDER — IBUPROFEN 800 MG/1
800 TABLET ORAL EVERY 6 HOURS
Status: DISCONTINUED | OUTPATIENT
Start: 2021-06-17 | End: 2021-06-20 | Stop reason: HOSPADM

## 2021-06-17 RX ORDER — SIMETHICONE 80 MG
80 TABLET,CHEWABLE ORAL
Status: DISCONTINUED | OUTPATIENT
Start: 2021-06-17 | End: 2021-06-20 | Stop reason: HOSPADM

## 2021-06-17 RX ORDER — ACETAMINOPHEN 500 MG
1000 TABLET ORAL
Status: DISCONTINUED | OUTPATIENT
Start: 2021-06-17 | End: 2021-06-20 | Stop reason: HOSPADM

## 2021-06-17 RX ORDER — ZOLPIDEM TARTRATE 5 MG/1
5 TABLET ORAL
Status: DISCONTINUED | OUTPATIENT
Start: 2021-06-17 | End: 2021-06-20 | Stop reason: HOSPADM

## 2021-06-17 RX ORDER — OXYCODONE HYDROCHLORIDE 5 MG/1
5 TABLET ORAL
Status: DISCONTINUED | OUTPATIENT
Start: 2021-06-17 | End: 2021-06-20 | Stop reason: HOSPADM

## 2021-06-17 RX ORDER — IBUPROFEN 800 MG/1
800 TABLET ORAL
Status: DISCONTINUED | OUTPATIENT
Start: 2021-06-17 | End: 2021-06-17

## 2021-06-17 RX ORDER — SODIUM CHLORIDE, SODIUM LACTATE, POTASSIUM CHLORIDE, CALCIUM CHLORIDE 600; 310; 30; 20 MG/100ML; MG/100ML; MG/100ML; MG/100ML
125 INJECTION, SOLUTION INTRAVENOUS CONTINUOUS
Status: DISCONTINUED | OUTPATIENT
Start: 2021-06-17 | End: 2021-06-17

## 2021-06-17 RX ORDER — PROMETHAZINE HYDROCHLORIDE 25 MG/1
25 TABLET ORAL
Status: DISCONTINUED | OUTPATIENT
Start: 2021-06-17 | End: 2021-06-20 | Stop reason: HOSPADM

## 2021-06-17 RX ADMIN — OXYCODONE 10 MG: 5 TABLET ORAL at 08:15

## 2021-06-17 RX ADMIN — IBUPROFEN 800 MG: 800 TABLET, FILM COATED ORAL at 23:39

## 2021-06-17 RX ADMIN — OXYCODONE HYDROCHLORIDE AND ACETAMINOPHEN 1 TABLET: 7.5; 325 TABLET ORAL at 13:17

## 2021-06-17 RX ADMIN — KETOROLAC TROMETHAMINE 30 MG: 30 INJECTION, SOLUTION INTRAMUSCULAR; INTRAVENOUS at 00:08

## 2021-06-17 RX ADMIN — OXYCODONE 10 MG: 5 TABLET ORAL at 18:42

## 2021-06-17 RX ADMIN — ACETAMINOPHEN 1000 MG: 500 TABLET, FILM COATED ORAL at 21:00

## 2021-06-17 RX ADMIN — SIMETHICONE 80 MG: 80 TABLET, CHEWABLE ORAL at 23:39

## 2021-06-17 RX ADMIN — DOCUSATE SODIUM 100 MG: 100 CAPSULE, LIQUID FILLED ORAL at 18:43

## 2021-06-17 RX ADMIN — KETOROLAC TROMETHAMINE 30 MG: 30 INJECTION, SOLUTION INTRAMUSCULAR; INTRAVENOUS at 06:01

## 2021-06-17 RX ADMIN — IBUPROFEN 800 MG: 800 TABLET, FILM COATED ORAL at 15:58

## 2021-06-17 RX ADMIN — OXYCODONE 10 MG: 5 TABLET ORAL at 01:33

## 2021-06-17 RX ADMIN — DOCUSATE SODIUM 100 MG: 100 CAPSULE, LIQUID FILLED ORAL at 08:15

## 2021-06-17 NOTE — PROGRESS NOTES
Anesthesiology  Post-op Note    Post-op day 1 s/p  via spinal with neuraxial opioids for post-op pain management. Visit Vitals  /77 (BP 1 Location: Left arm, BP Patient Position: Sitting)   Pulse 97   Temp 36.7 °C (98 °F)   Resp 18   Ht 5' 8\" (1.727 m)   Wt 116.6 kg (257 lb)   LMP 2020   SpO2 98%   Breastfeeding Unknown   BMI 39.08 kg/m²     Airway patent, patient appropriately hydrated and appears euvolemic. Patient is Alert and oriented. Pain is well controlled. Pruritus is well controlled. Nausea is absent. No complaints about back or site of injection. Motor and sensory function has returned to baseline in lower extremities. Patient is satisfied with anesthetic and reports no complications. Continue current orders, then initiate surgeon's orders for pain management 24 hours after . Follow up per surgeon.

## 2021-06-17 NOTE — PROGRESS NOTES
Post-Operative Day Number 1 Progress Note    Patient doing well post-op day 1 from  delivery without significant complaints. Pain controlled on current medication. Voiding without difficulty, normal lochia. Vitals:  Blood pressure 129/77, pulse 97, temperature 98 °F (36.7 °C), resp. rate 18, height 5' 8\" (1.727 m), weight 257 lb (116.6 kg), last menstrual period 2020, SpO2 98 %, unknown if currently breastfeeding. Vital signs stable, afebrile. Exam:  Patient without distress. Abdomen soft, fundus firm at level of umbilicus, nontender. Incision dry and  clean without erythema. Lower extremities are negative for swelling, cords or tenderness. Labs: Hgb 7.2 ( was 8.4 pre op) will check again in am, but no need for transfusion now    Assessment and Plan:  Patient appears to be having uncomplicated post- course. Continue routine post-op care and maternal education.   BP's are good, no antiHTN meds currently

## 2021-06-17 NOTE — PROGRESS NOTES
Jama d/c'd, IV capped, sequential device discontinued. Ambulated to restroom for teaching of raeann-care and pad change. Bed linen changed. Returned to bed safely. Tolerated without difficulty.

## 2021-06-17 NOTE — LACTATION NOTE
In to follow up with mom and infant. Mom stated that infant continues to latch and nurse well. She has no questions or concerns. Lactation consultant will follow up tomorrow.

## 2021-06-18 LAB
HCT VFR BLD AUTO: 24.9 % (ref 35.8–46.3)
HGB BLD-MCNC: 7.3 G/DL (ref 11.7–15.4)

## 2021-06-18 PROCEDURE — 36415 COLL VENOUS BLD VENIPUNCTURE: CPT

## 2021-06-18 PROCEDURE — 74011250637 HC RX REV CODE- 250/637: Performed by: OBSTETRICS & GYNECOLOGY

## 2021-06-18 PROCEDURE — 65270000029 HC RM PRIVATE

## 2021-06-18 PROCEDURE — 85018 HEMOGLOBIN: CPT

## 2021-06-18 PROCEDURE — 2709999900 HC NON-CHARGEABLE SUPPLY

## 2021-06-18 RX ORDER — LABETALOL 100 MG/1
100 TABLET, FILM COATED ORAL EVERY 12 HOURS
Status: DISCONTINUED | OUTPATIENT
Start: 2021-06-18 | End: 2021-06-20 | Stop reason: HOSPADM

## 2021-06-18 RX ADMIN — IBUPROFEN 800 MG: 800 TABLET, FILM COATED ORAL at 11:12

## 2021-06-18 RX ADMIN — SIMETHICONE 80 MG: 80 TABLET, CHEWABLE ORAL at 07:43

## 2021-06-18 RX ADMIN — OXYCODONE 10 MG: 5 TABLET ORAL at 07:43

## 2021-06-18 RX ADMIN — OXYCODONE 10 MG: 5 TABLET ORAL at 18:20

## 2021-06-18 RX ADMIN — LABETALOL HYDROCHLORIDE 100 MG: 100 TABLET, FILM COATED ORAL at 11:11

## 2021-06-18 RX ADMIN — SIMETHICONE 80 MG: 80 TABLET, CHEWABLE ORAL at 11:12

## 2021-06-18 RX ADMIN — IBUPROFEN 800 MG: 800 TABLET, FILM COATED ORAL at 05:39

## 2021-06-18 RX ADMIN — OXYCODONE 10 MG: 5 TABLET ORAL at 01:48

## 2021-06-18 RX ADMIN — SIMETHICONE 80 MG: 80 TABLET, CHEWABLE ORAL at 17:18

## 2021-06-18 RX ADMIN — ACETAMINOPHEN 1000 MG: 500 TABLET, FILM COATED ORAL at 05:38

## 2021-06-18 RX ADMIN — IBUPROFEN 800 MG: 800 TABLET, FILM COATED ORAL at 17:18

## 2021-06-18 RX ADMIN — ACETAMINOPHEN 1000 MG: 500 TABLET, FILM COATED ORAL at 11:18

## 2021-06-18 RX ADMIN — DOCUSATE SODIUM 100 MG: 100 CAPSULE, LIQUID FILLED ORAL at 17:18

## 2021-06-18 RX ADMIN — ACETAMINOPHEN 1000 MG: 500 TABLET, FILM COATED ORAL at 17:17

## 2021-06-18 RX ADMIN — SIMETHICONE 80 MG: 80 TABLET, CHEWABLE ORAL at 22:56

## 2021-06-18 RX ADMIN — LABETALOL HYDROCHLORIDE 100 MG: 100 TABLET, FILM COATED ORAL at 22:56

## 2021-06-18 RX ADMIN — OXYCODONE 10 MG: 5 TABLET ORAL at 13:12

## 2021-06-18 RX ADMIN — DOCUSATE SODIUM 100 MG: 100 CAPSULE, LIQUID FILLED ORAL at 07:43

## 2021-06-18 RX ADMIN — ACETAMINOPHEN 1000 MG: 500 TABLET, FILM COATED ORAL at 22:56

## 2021-06-18 RX ADMIN — IBUPROFEN 800 MG: 800 TABLET, FILM COATED ORAL at 22:56

## 2021-06-18 NOTE — LACTATION NOTE
This note was copied from a baby's chart. Pump machine, parts, and soap brought to room. Mother educated on how/when to pump. Mother educated on how to wash pump parts. Encouraged mother to breastfeed first then pump afterwards and give colostrum to baby. Mother voiced understanding. Mother pumping at this time.

## 2021-06-18 NOTE — LACTATION NOTE
This note was copied from a baby's chart. Discussed with mother that due to baby bili being elevated that after she is finished breastfeeding to pump for an additional 10 min and that we could collected and give back to baby. Mother agrees with that plan. Mother to call out after next feeding to have pump machine brought to room.

## 2021-06-18 NOTE — PROGRESS NOTES
Post-Operative Day Number 2 Progress Note    Patient doing well post-op day 2 from  delivery without significant complaints. Pain controlled on current medication. Voiding without difficulty, normal lochia. Vitals:    Patient Vitals for the past 8 hrs:   BP Temp Pulse Resp SpO2   21 0755 (!) 144/86 97.6 °F (36.4 °C) 81 17 97 %     Temp (24hrs), Av.8 °F (36.6 °C), Min:97.6 °F (36.4 °C), Max:98 °F (36.7 °C)      Vital signs stable, afebrile. Exam:  Patient without distress. Abdomen soft, fundus firm at level of umbilicus, non tender               Lower extremities are negative for swelling, cords or tenderness. Lab/Data Review: All lab results for the last 24 hours reviewed. Assessment and Plan:  Patient appears to be having uncomplicated post- course. Continue routine post-op care and maternal education. Was on labetalol prior to pregnancy will restart at 100mg BID. Watch pressures probable discharge tomorrow.

## 2021-06-18 NOTE — PROGRESS NOTES
SW is familiar with patient as we met on last week due to admission for hypertension. SW follow-up with patient/ while social distancing w/appropriate PPE. Patient currently on Wellbutrin due to anxiety. She plans to remain on it postpartum. Patient given informational packet on  mood & anxiety disorders (resources/education). Family denies any additional needs from  at this time. Family has 's contact information should any needs/questions arise.     SHERIN Lara-STEPHENIE  [address] [place] [address]   [phone]

## 2021-06-18 NOTE — PROGRESS NOTES
Shift assessment complete see flowsheet. Discussed today plan of care with pt. Bleeding precautions given. Pt instructed to continue keeping track of her I&O's. Pt denies h/a, vision changes, RUQ pain. Pt states she has not taken her labetalol since 6/15 and she was on this medication prior to pregnancy. Will discuss with MD on rounds. Pt encouraged to ambulate in hallway today. Questions encouraged and answered. No s/s of distress noted. Pt in bed with call light in reach.

## 2021-06-18 NOTE — PROGRESS NOTES
/84 however pt c/o pain and pt restarting her Labetalol at this time, see MAR. Pt has not taken Labetalol since 6/15.

## 2021-06-18 NOTE — PROGRESS NOTES
06/18/21 0148   Pain 1   Pain Scale 1 Numeric (0 - 10)   Pain Intensity 1 7   Pain Location 1 Abdomen; Incisional   Pain Orientation 1 Lower   Pain Description 1 Sore   Pain Intervention(s) 1 Medication (see MAR)       Oxycodone given for pain per patient request.

## 2021-06-18 NOTE — LACTATION NOTE
In to follow up with mom and infant. Experienced mom stated that infant has continued to latch and nurse well. He was latched on the right breast in the cradle hold and sucking rhythmically when I walked in. Mom stated that she feels confident and has no concerns at this time. Lactation consultant will follow up tomorrow.

## 2021-06-18 NOTE — PROGRESS NOTES
Dr. India Kaur at [facility] and made aware that pt has [facility] and was on Labetalol prior to and during her pregnancy but has not taken in since 6/15. MD voiced understanding. No new orders received. MD to round on pt.

## 2021-06-19 PROCEDURE — 2709999900 HC NON-CHARGEABLE SUPPLY

## 2021-06-19 PROCEDURE — 74011250637 HC RX REV CODE- 250/637: Performed by: OBSTETRICS & GYNECOLOGY

## 2021-06-19 PROCEDURE — 65270000029 HC RM PRIVATE

## 2021-06-19 RX ORDER — FUROSEMIDE 20 MG/1
20 TABLET ORAL DAILY
Status: DISCONTINUED | OUTPATIENT
Start: 2021-06-19 | End: 2021-06-20 | Stop reason: HOSPADM

## 2021-06-19 RX ADMIN — IBUPROFEN 800 MG: 800 TABLET, FILM COATED ORAL at 10:45

## 2021-06-19 RX ADMIN — OXYCODONE 10 MG: 5 TABLET ORAL at 00:21

## 2021-06-19 RX ADMIN — LABETALOL HYDROCHLORIDE 100 MG: 100 TABLET, FILM COATED ORAL at 22:05

## 2021-06-19 RX ADMIN — SIMETHICONE 80 MG: 80 TABLET, CHEWABLE ORAL at 08:00

## 2021-06-19 RX ADMIN — ACETAMINOPHEN 1000 MG: 500 TABLET, FILM COATED ORAL at 18:42

## 2021-06-19 RX ADMIN — SIMETHICONE 80 MG: 80 TABLET, CHEWABLE ORAL at 12:54

## 2021-06-19 RX ADMIN — SIMETHICONE 80 MG: 80 TABLET, CHEWABLE ORAL at 22:05

## 2021-06-19 RX ADMIN — DOCUSATE SODIUM 100 MG: 100 CAPSULE, LIQUID FILLED ORAL at 08:00

## 2021-06-19 RX ADMIN — LABETALOL HYDROCHLORIDE 100 MG: 100 TABLET, FILM COATED ORAL at 08:00

## 2021-06-19 RX ADMIN — SIMETHICONE 80 MG: 80 TABLET, CHEWABLE ORAL at 16:14

## 2021-06-19 RX ADMIN — IBUPROFEN 800 MG: 800 TABLET, FILM COATED ORAL at 05:13

## 2021-06-19 RX ADMIN — IBUPROFEN 800 MG: 800 TABLET, FILM COATED ORAL at 16:14

## 2021-06-19 RX ADMIN — ACETAMINOPHEN 1000 MG: 500 TABLET, FILM COATED ORAL at 05:51

## 2021-06-19 RX ADMIN — FUROSEMIDE 20 MG: 20 TABLET ORAL at 11:22

## 2021-06-19 RX ADMIN — OXYCODONE 5 MG: 5 TABLET ORAL at 12:54

## 2021-06-19 RX ADMIN — OXYCODONE 10 MG: 5 TABLET ORAL at 17:09

## 2021-06-19 RX ADMIN — IBUPROFEN 800 MG: 800 TABLET, FILM COATED ORAL at 22:05

## 2021-06-19 RX ADMIN — OXYCODONE 10 MG: 5 TABLET ORAL at 08:00

## 2021-06-19 RX ADMIN — DOCUSATE SODIUM 100 MG: 100 CAPSULE, LIQUID FILLED ORAL at 17:09

## 2021-06-19 RX ADMIN — ACETAMINOPHEN 1000 MG: 500 TABLET, FILM COATED ORAL at 11:22

## 2021-06-19 NOTE — PROGRESS NOTES
06/19/21 0021   Pain 1   Pain Scale 1 Numeric (0 - 10)   Pain Intensity 1 7   Pain Location 1 Abdomen; Incisional   Pain Orientation 1 Lower   Pain Description 1 Cramping; Sore   Pain Intervention(s) 1 Medication (see MAR)       Oxycodone given for pain per patient request.

## 2021-06-19 NOTE — PROGRESS NOTES
Post-Operative Day Number 3 Progress/Discharge Note    Patient doing well post-op day 3 from  delivery without significant complaints. Pain controlled on current medication. Voiding without difficulty, normal lochia. Pt has had significant edema develop overnight and has had low uop overnight as well  Will start lasix- r/b discussed  Would prefer she stay another night to diurese pt good with this    Vitals:    Patient Vitals for the past 8 hrs:   BP Temp Pulse Resp SpO2   21 1122 138/79       21 1112 138/79 98.1 °F (36.7 °C) 91 20 99 %   21 0800 139/76  99     21 0759 139/76 97.5 °F (36.4 °C) 99 19 99 %   21 0431 127/67 98.5 °F (36.9 °C) 94 18 96 %     Temp (24hrs), Av.1 °F (36.7 °C), Min:97.5 °F (36.4 °C), Max:98.5 °F (36.9 °C)    Date 21 07 - 21 0659 21 07 - 21 0659   Shift 3893-5668 8374-5303 24 Hour Total 1016-7309 2991-6392 24 Hour Total   INTAKE   P.O.  360  360     P. O.  360  360   Shift Total(mL/kg) 850(7.3) 520(4.5) 1370(11.8) 360(3.1)  360(3.1)   OUTPUT   Urine(mL/kg/hr) 550(0.4) 400(0.3) 950(0.3) 100  100     Urine Voided 550 400 950 100  100   Shift Total(mL/kg) 550(4.7) 400(3.4) 950(8.1) 100(0.9)  100(0.9)    120 420 260  260   Weight (kg) 116.6 116.6 116.6 116.6 116.6 116.6       Vital signs stable, afebrile. Exam:  Patient without distress. Abdomen soft, fundus firm at level of umbilicus, non tender. Incision dry and                      clean without erythema. Lower extremities are positive for edema negcords or tenderness. Lab/Data Review:  CBC: No results found for: WBC, HGB, HGBEXT, HCT, HCTEXT, PLT, PLTEXT, HGBEXT, HCTEXT, PLTEXT    Assessment and Plan:  Patient appears to be having uncomplicated post- course.     Start lasix  Watch I/os

## 2021-06-19 NOTE — PROGRESS NOTES
Call placed to Dr. Mary Alejandre informed MD that pt Intake 1750ml and Output 1050 and pt is +1736 since admit and that edema in BLE appears worse than this morning but put has be up to recliner and couch several times today. MD voiced understanding and orders received to continue monitoring I&O's.

## 2021-06-19 NOTE — PROGRESS NOTES
Dr. [facility] at [facility], informed MD that pt restarted her Labetalol yesterday morning, informed MD that edema in BLE does seem to have increased since yesterday. Pt is +1036 since admit. Per Dr. Hoang Cruz pt to start Lasix 20mg every day x 5 days, orders read back and verified. Orders placed.

## 2021-06-19 NOTE — LACTATION NOTE
This note was copied from a baby's chart. Lactation visit. Baby under phototherapy overnight and mom pumped and fed back pumped milk at all feeds last night via bottle, breastfeeding also. Bilirubin level now low risk. Milk in. Mom pumping high volumes even post nursing. Can continue to pump post nursing as mom desires. May not need to pump after all feeds unless mom desires to have high volume of milk for storage. Can pump post nursing as desired. Additional milk not needed if baby latching well at all feeds unless mom feels baby needs more. Mom states baby hesitant to latch this AM but RN assisted and baby did well. Offered help today with latching as needed. Questions answered. Discharge anticipated soon.

## 2021-06-19 NOTE — PROGRESS NOTES
Problem: Falls - Risk of  Goal: *Absence of Falls  Description: Document [place] Fall Risk and appropriate interventions in the flowsheet.   Outcome: Resolved/Met     Problem: Patient Education: Go to Patient Education Activity  Goal: Patient/Family Education  Outcome: Resolved/Met     Problem:  Delivery: Postpartum Day 2  Goal: Discharge Planning  Outcome: Resolved/Met     Problem:  Delivery: Postpartum Day 3  Goal: Off Pathway (Use only if patient is Off Pathway)  Outcome: Resolved/Met  Goal: Activity/Safety  Outcome: Resolved/Met  Goal: Consults, if ordered  Outcome: Resolved/Met  Goal: Nutrition/Diet  Outcome: Resolved/Met  Goal: Discharge Planning  Outcome: Resolved/Met  Goal: Medications  Outcome: Resolved/Met  Goal: Treatments/Interventions/Procedures  Outcome: Resolved/Met  Goal: Psychosocial  Outcome: Resolved/Met     Problem:  Delivery: Discharge Outcomes  Goal: *Follow-up appointments as indicated  Outcome: Resolved/Met  Goal: *Describes available resources and support systems  Outcome: Resolved/Met  Goal: *No signs and symptoms of infection  Outcome: Resolved/Met  Goal: *Birth certificate information completed  Outcome: Resolved/Met  Goal: *Received and verbalizes understanding of discharge plan and instructions  Outcome: Resolved/Met  Goal: *Vital signs within defined limits  Outcome: Resolved/Met  Goal: *Labs within defined limits  Outcome: Resolved/Met  Goal: *Hemodynamically stable  Outcome: Resolved/Met  Goal: *Optimal pain control at patient's stated goal  Outcome: Resolved/Met  Goal: *Participates in infant care  Outcome: Resolved/Met  Goal: *Demonstrates progressive activity  Outcome: Resolved/Met  Goal: *Appropriate parent-infant bonding  Outcome: Resolved/Met  Goal: *Tolerating diet  Outcome: Resolved/Met  Goal: *Verbalizes name, dosage, time, side effects, and number of days to continue medications  Outcome: Resolved/Met  Goal: *Influenza vaccine administered (October-March)  Outcome: Resolved/Met

## 2021-06-19 NOTE — PROGRESS NOTES
Report received from Nicolette Charles RN care assumed. Pt resting quietly in bed with eye closed and call light within reach.

## 2021-06-20 VITALS
BODY MASS INDEX: 38.95 KG/M2 | TEMPERATURE: 98.7 F | HEART RATE: 97 BPM | HEIGHT: 68 IN | DIASTOLIC BLOOD PRESSURE: 72 MMHG | OXYGEN SATURATION: 98 % | RESPIRATION RATE: 20 BRPM | WEIGHT: 257 LBS | SYSTOLIC BLOOD PRESSURE: 136 MMHG

## 2021-06-20 LAB
ABO + RH BLD: NORMAL
BLD PROD TYP BPU: NORMAL
BLD PROD TYP BPU: NORMAL
BLOOD GROUP ANTIBODIES SERPL: NORMAL
BPU ID: NORMAL
BPU ID: NORMAL
CROSSMATCH RESULT,%XM: NORMAL
CROSSMATCH RESULT,%XM: NORMAL
SPECIMEN EXP DATE BLD: NORMAL
STATUS OF UNIT,%ST: NORMAL
STATUS OF UNIT,%ST: NORMAL
UNIT DIVISION, %UDIV: 0
UNIT DIVISION, %UDIV: 0

## 2021-06-20 PROCEDURE — 74011250637 HC RX REV CODE- 250/637: Performed by: OBSTETRICS & GYNECOLOGY

## 2021-06-20 RX ORDER — LABETALOL 100 MG/1
100 TABLET, FILM COATED ORAL EVERY 12 HOURS
Qty: 60 TABLET | Refills: 1 | Status: SHIPPED | OUTPATIENT
Start: 2021-06-20 | End: 2021-07-29

## 2021-06-20 RX ORDER — IBUPROFEN 800 MG/1
800 TABLET ORAL
Qty: 30 TABLET | Refills: 0 | Status: SHIPPED | OUTPATIENT
Start: 2021-06-20

## 2021-06-20 RX ORDER — OXYCODONE HYDROCHLORIDE 5 MG/1
5 TABLET ORAL
Qty: 20 TABLET | Refills: 0 | Status: SHIPPED | OUTPATIENT
Start: 2021-06-20 | End: 2021-06-23

## 2021-06-20 RX ORDER — FUROSEMIDE 20 MG/1
TABLET ORAL
Qty: 3 TABLET | Refills: 0 | Status: SHIPPED | OUTPATIENT
Start: 2021-06-21 | End: 2021-07-29

## 2021-06-20 RX ADMIN — DOCUSATE SODIUM 100 MG: 100 CAPSULE, LIQUID FILLED ORAL at 08:04

## 2021-06-20 RX ADMIN — IBUPROFEN 800 MG: 800 TABLET, FILM COATED ORAL at 05:00

## 2021-06-20 RX ADMIN — ACETAMINOPHEN 1000 MG: 500 TABLET, FILM COATED ORAL at 01:09

## 2021-06-20 RX ADMIN — OXYCODONE 5 MG: 5 TABLET ORAL at 11:32

## 2021-06-20 RX ADMIN — OXYCODONE 5 MG: 5 TABLET ORAL at 02:08

## 2021-06-20 RX ADMIN — ACETAMINOPHEN 1000 MG: 500 TABLET, FILM COATED ORAL at 09:29

## 2021-06-20 RX ADMIN — OXYCODONE 5 MG: 5 TABLET ORAL at 08:04

## 2021-06-20 RX ADMIN — IBUPROFEN 800 MG: 800 TABLET, FILM COATED ORAL at 11:33

## 2021-06-20 RX ADMIN — LABETALOL HYDROCHLORIDE 100 MG: 100 TABLET, FILM COATED ORAL at 08:04

## 2021-06-20 RX ADMIN — FUROSEMIDE 20 MG: 20 TABLET ORAL at 08:04

## 2021-06-20 RX ADMIN — SIMETHICONE 80 MG: 80 TABLET, CHEWABLE ORAL at 08:04

## 2021-06-20 NOTE — PROGRESS NOTES
Post-Operative Day Number 4 Progress/Discharge Note    Patient doing well post-op day 3 from  delivery without significant complaints. Pain controlled on current medication. Voiding without difficulty, normal lochia. States swelling is better BP stable on labetalol and lasix    Vitals:    Patient Vitals for the past 8 hrs:   BP Temp Pulse Resp SpO2   21 1022 136/72       21 1015 (!) 149/76       21 0738 (!) 152/80 98.7 °F (37.1 °C) 97 20 98 %   21 0441 137/76 98.7 °F (37.1 °C) 91 16 99 %     Temp (24hrs), Av.4 °F (36.9 °C), Min:98 °F (36.7 °C), Max:98.7 °F (37.1 °C)      Vital signs stable, afebrile. Exam:  Patient without distress. Abdomen soft, fundus firm at level of umbilicus, non tender. Incision dry and                      clean without erythema. Lower extremities are negative for swelling, cords or tenderness. Lab/Data Review:  CBC: No results found for: WBC, HGB, HGBEXT, HCT, HCTEXT, PLT, PLTEXT, HGBEXT, HCTEXT, PLTEXT    Assessment and Plan:  Patient appears to be having uncomplicated post- course. Continue routine post-op care and maternal education.   Plan discharge for today with follow up in our office in 1 week for BP check   Continue lasix labetalol at home   Call with any s/s

## 2021-06-20 NOTE — PROGRESS NOTES
Patient discharged to home per MD orders. Discharge instructions reviewed with patient. Questions encouraged and answered. Patient verbalizes understanding.            Patient to call out when ready to be escorted out

## 2021-06-20 NOTE — DISCHARGE INSTRUCTIONS
Section: What to Expect at Home    Your Recovery:  A  section, or , is surgery to deliver your baby through a cut, called an incision that the doctor makes in your lower belly and uterus. You may have some pain in your lower belly and need pain medicine for 1 to 2 weeks. You can expect some vaginal bleeding for several weeks. You will probably need about 6 weeks to fully recover. It is important to take it easy while the incision is healing. Avoid heavy lifting, strenuous activities, or exercises that strain the belly muscles while you are recovering. Ask a family member or friend for help with housework, cooking, and shopping. This care sheet gives you a general idea about how long it will take for you to recover. But each person recovers at a different pace. Follow the steps below to get better as quickly as possible. How can you care for yourself at home? Activity  · Rest when you feel tired. Getting enough sleep will help you recover. · Try to walk each day. Start by walking a little more than you did the day before. Bit by bit, increase the amount you walk. Walking boosts blood flow and helps prevent pneumonia, constipation, and blood clots. · Avoid strenuous activities, such as bicycle riding, jogging, weightlifting, and aerobic exercise, for 6 weeks or until your doctor says it is okay. · Until your doctor says it is okay, do not lift anything heavier than your baby. · Do not do sit-ups or other exercise that strain the belly muscles for 6 weeks or until your doctor says it is okay. · Hold a pillow over your incision when you cough or take deep breaths. This will support your belly and decrease your pain. · You may shower as usual. Pat the incision dry when you are done. · You will have some vaginal bleeding. Wear sanitary pads. Do not douche or use tampons until your doctor says it is okay. · Ask your doctor when you can drive again.   · You will probably need to take at least 6 weeks off work. It depends on the type of work you do and how you feel. · Wait until you are healed (about 4 to 6 weeks) before you have sexual intercourse. Your doctor will tell you when it is okay to have sex. · Talk to your doctor about birth control. You can get pregnant even before your period returns. Also, you can get pregnant while you are breast-feeding. Incision care  Your skin is your body's first line of defense against germs, but an incision site leaves an easy way for germs to enter your body. To prevent infection:  · Clean your hands frequently and before and after changing any touching any dressings. · If you have strips of tape on the incision, leave the tape on for a week or until it falls off. · Look at your incision closely every day for any changes. Contact your doctor if you experience any signs of infection, such as fever or increased redness at the surgical site. · Wash the area daily with warm, soapy water, and pat it dry. Don't use hydrogen peroxide or alcohol, which can slow healing. You may cover the area with a gauze bandage if it weeps or rubs against clothing. Change the bandage every day. · Keep the area clean and dry. Diet  · You can eat your normal diet. If your stomach is upset, try bland, low-fat foods like plain rice, broiled chicken, toast, and yogurt. · Drink plenty of fluids (unless your doctor tells you not to). · You may notice that your bowel movements are not regular right after your surgery. This is common. Try to avoid constipation and straining with bowel movements. You may want to take a fiber supplement every day. If you have not had a bowel movement after a couple of days, ask your doctor about taking a mild laxative. · If you are breast-feeding, do not drink any alcohol. Medicines  · Take pain medicines exactly as directed. · If the doctor gave you a prescription medicine for pain, take it as prescribed.   · If you are not taking a prescription pain medicine, ask your doctor if you can take an over-the-counter medicine such as acetaminophen (Tylenol), ibuprofen (Advil, Motrin), or naproxen (Aleve), for cramps. Read and follow all instructions on the label. Do not take aspirin, because it can cause more bleeding. Do not take acetaminophen (Tylenol) and other acetaminophen containing medications (i.e. Percocet) at the same time. · If you think your pain medicine is making you sick to your stomach:  · Take your medicine after meals (unless your doctor has told you not to). · Ask your doctor for a different pain medicine. · If your doctor prescribed antibiotics, take them as directed. Do not stop taking them just because you feel better. You need to take the full course of antibiotics. Mental Health  · Many women get the \"baby blues\" during the first few days after childbirth. You may lose sleep, feel irritable, and cry easily. You may feel happy one minute and sad the next. Hormone changes are one cause of these emotional changes. Also, the demands of a new baby, along with visits from relatives or other family needs, add to a mother's stress. The \"baby blues\" often peak around the fourth day. Then they ease up in less than 2 weeks. · If your moodiness or anxiety lasts for more than 2 weeks, or if you feel like life is not worth living, you may have postpartum depression. This is different for each mother. Some mothers with serious depression may worry intensely about their infant's well-being. Others may feel distant from their child. Some mothers might even feel that they might harm their baby. A mother may have signs of paranoia, wondering if someone is watching her. · With all the changes in your life, you may not know if you are depressed. Pregnancy sometimes causes changes in how you feel that are similar to the symptoms of depression.   · Symptoms of depression include:  · Feeling sad or hopeless and losing interest in daily activities. These are the most common symptoms of depression. · Sleeping too much or not enough. · Feeling tired. You may feel as if you have no energy. · Eating too much or too little. · [employer]PART[employer] (PSI) offers a Warm line; Chat with the Expert phone sessions; Information and Articles about Pregnancy and Postpartum Mood Disorders; Comprehensive List of Free Support Groups; Knowledgeable local coordinators who will offer support, information, and resources; Guide to Resources on [employer]; Calendar of events in the  mood disorders community; Latest News and Research; and [facility] [address] for [employer]. Remember - You are not alone; You are not to blame; With help, you will be well. 6-892-685-PPD(3129). WWW. POSTPARTUM. NET   · Writing or talking about death, such as writing suicide notes or talking about guns, knives, or pills. Keep the numbers for these national suicide hotlines: 7-291-289-TALK (3-446.401.8916) and 3-848-CAPHJFU (8-938.541.4418). If you or someone you know talks about suicide or feeling hopeless, get help right away. Other instructions  · If you breast-feed your baby, you may be more comfortable while you are healing if you place the baby so that he or she is not resting on your belly. Try tucking your baby under your arm, with his or her body along the side you will be feeding on. Support your baby's upper body with your arm. With that hand you can control your baby's head to bring his or her mouth to your breast. This is sometimes called the football hold. Follow-up care is a key part of your treatment and safety. Be sure to make and go to all appointments, and call your doctor if you are having problems. It's also a good idea to know your test results and keep a list of the medicines you take. When should you call for help? Call 911 anytime you think you may need emergency care.  For example, call if:  · You are thinking of hurting yourself, your baby, or anyone else. · You passed out (lost consciousness). · You have symptoms of a blood clot in your lung (called a pulmonary embolism). These may include:  · Sudden chest pain. · Trouble breathing. · Coughing up blood. Call your doctor now or seek immediate medical care if:    · You have severe vaginal bleeding. · You are soaking through a pad each hour for 2 or more hours. · Your vaginal bleeding seems to be getting heavier or is still bright red 4 days after delivery. · You are dizzy or lightheaded, or you feel like you may faint. · You are vomiting or cannot keep fluids down. · You have a fever. · You have new or more belly pain. · You have loose stitches, or your incision comes open. · You have symptoms of infection, such as:  · Increased pain, swelling, warmth, or redness. · Red streaks leading from the incision. · Pus draining from the incision. · A fever  · You pass tissue (not just blood). · Your vaginal discharge smells bad. · Your belly feels tender or full and hard. · Your breasts are continuously painful or red. · You feel sad, anxious, or hopeless for more than a few days. · You have sudden, severe pain in your belly. · You have symptoms of a blood clot in your leg (called a deep vein thrombosis), such as:  · Pain in your calf, back of the knee, thigh, or groin. · Redness and swelling in your leg or groin. · You have symptoms of preeclampsia, such as:  · Sudden swelling of your face, hands, or feet. · New vision problems (such as dimness or blurring). · A severe headache. · Your blood pressure is higher than it should be or rises suddenly. · You have new nausea or vomiting. Watch closely for changes in your health, and be sure to contact your doctor if you have any problems. Patient Education        After Your Delivery (the Postpartum Period): Care Instructions  Your Care Instructions     Congratulations on the birth of your baby.  Like pregnancy, the  period can be a time of excitement, alessandra, and exhaustion. You may look at your wondrous little baby and feel happy. You may also be overwhelmed by your new sleep hours and new responsibilities. At first, babies often sleep during the days and are awake at night. They do not have a pattern or routine. They may make sudden gasps, jerk themselves awake, or look like they have crossed eyes. These are all normal, and they may even make you smile. In these first weeks after delivery, try to take good care of yourself. It may take 4 to 6 weeks to feel like yourself again, and possibly longer if you had a  birth. You will likely feel very tired for several weeks. Your days will be full of ups and downs, but lots of alessandra as well. Follow-up care is a key part of your treatment and safety. Be sure to make and go to all appointments, and call your doctor if you are having problems. It's also a good idea to know your test results and keep a list of the medicines you take. How can you care for yourself at home? Take care of your body after delivery  · Use pads instead of tampons for the bloody flow that may last as long as 2 weeks. · Ease cramps with ibuprofen (Advil, Motrin). · Ease soreness of hemorrhoids and the area between your vagina and rectum with ice compresses or witch hazel pads. · Ease constipation by drinking lots of fluid and eating high-fiber foods. Ask your doctor about over-the-counter stool softeners. · Cleanse yourself with a gentle squeeze of warm water from a bottle instead of wiping with toilet paper. · Take a sitz bath in warm water several times a day. · Wear a good nursing bra. Ease sore and swollen breasts with warm, wet washcloths. · If you are not breastfeeding, use ice rather than heat for breast soreness. · Your period may not start for several months if you are breastfeeding.  You may bleed more, and longer at first, than you did before you got pregnant. · Wait until you are healed (about 4 to 6 weeks) before you have sexual intercourse. Your doctor will tell you when it is okay to have sex. · Try not to travel with your baby for 5 or 6 weeks. If you take a long car trip, make frequent stops to walk around and stretch. Avoid exhaustion  · Rest every day. Try to nap when your baby naps. · Ask another adult to be with you for a few days after delivery. · Plan for  if you have other children. · Stay flexible so you can eat at odd hours and sleep when you need to. Both you and your baby are making new schedules. · Plan small trips to get out of the house. Change can make you feel less tired. · Ask for help with housework, cooking, and shopping. Remind yourself that your job is to care for your baby. Know about help for postpartum depression  · \"Baby blues\" are common for the first 1 to 2 weeks after birth. You may cry or feel sad or irritable for no reason. · Rest whenever you can. Being tired makes it harder to handle your emotions. · Go for walks with your baby. · Talk to your partner, friends, and family about your feelings. · If your symptoms last for more than a few weeks, or if you feel very depressed, ask your doctor for help. · Postpartum depression can be treated. Support groups and counseling can help. Sometimes medicine can also help. Stay healthy  · Eat healthy foods so you have more energy and lose extra baby pounds. · If you breastfeed, avoid drugs. If you quit smoking during pregnancy, try to stay smoke-free. If you choose to have a drink now and then, have only one drink, and limit the number of occasions that you have a drink. Wait to breastfeed at least 2 hours after you have a drink to reduce the amount of alcohol the baby may get in the milk. · Start daily exercise after 4 to 6 weeks, but rest when you feel tired. · Learn exercises to tone your belly. Do Kegel exercises to regain strength in your pelvic muscles. You can do these exercises while you stand or sit. ? Squeeze the same muscles you would use to stop your urine. Your belly and thighs should not move. ? Hold the squeeze for 3 seconds, and then relax for 3 seconds. ? Start with 3 seconds. Then add 1 second each week until you are able to squeeze for 10 seconds. ? Repeat the exercise 10 to 15 times for each session. Do three or more sessions each day. · Find a class for new mothers and new babies that has an exercise time. · If you had a  birth, give yourself a bit more time before you exercise, and be careful. When should you call for help? Call  911 anytime you think you may need emergency care. For example, call if:    · You have thoughts of harming yourself, your baby, or another person.     · You passed out (lost consciousness).     · You have chest pain, are short of breath, or cough up blood.     · You have a seizure. Call your doctor now or seek immediate medical care if:    · You have severe vaginal bleeding. This means you are passing blood clots and soaking through a pad each hour for 2 or more hours.     · You are dizzy or lightheaded, or you feel like you may faint.     · You have a fever.     · You have new or more belly pain.     · You have signs of a blood clot in your leg (called a deep vein thrombosis), such as:  ? Pain in the calf, back of the knee, thigh, or groin. ? Redness and swelling in your leg or groin.     · You have signs of preeclampsia, such as:  ? Sudden swelling of your face, hands, or feet. ? New vision problems (such as dimness, blurring, or seeing spots). ? A severe headache. Watch closely for changes in your health, and be sure to contact your doctor if:    · Your vaginal bleeding seems to be getting heavier.     · You have new or worse vaginal discharge.     · You feel sad, anxious, or hopeless for more than a few days.     · You do not get better as expected. Where can you learn more?   Go to http://www.gray.com/  Enter N698 in the search box to learn more about \"After Your Delivery (the Postpartum Period): Care Instructions. \"  Current as of: 2020               Content Version: 12.8   [employer]. Care instructions adapted under license by [employer] (which disclaims liability or warranty for this information). If you have questions about a medical condition or this instruction, always ask your healthcare professional. [address] any warranty or liability for your use of this information. DISCHARGE SUMMARY from Nurse    PATIENT INSTRUCTIONS:      What to do at Home:      Discharge instruction to follow: Activity: Pelvis rest for 6 weeks, Nothing in vagina for 6 weeks     No heavy lifting over 15 lbs for 2 weeks     No driving for 2 weeks, NO driving if taking narcotics     No push/pull motion such as sweeping or vacuuming for 2 weeks     No tub baths or swimming for 6 weeks     section keep incision clean and dry, may shower as normal with soap and water. Inspect incision every day for signs of infection listed below. Continue to use raeann-bottle with every void or bowel movement until comfortable stopping. Change sanitary pad after each urination or bowel movement. Call MD for the following:      Fever over 101 F; pain not relieved by medication; foul smelling vaginal discharge or increase in vaginal bleeding. Redness, swelling, or drainage from  incision. Take medication as prescribed. Follow up with MD as order. *  Please give a list of your current medications to your Primary Care Provider. *  Please update this list whenever your medications are discontinued, doses are      changed, or new medications (including over-the-counter products) are added. *  Please carry medication information at all times in case of emergency situations.     These are general instructions for a healthy lifestyle:    No smoking/ No tobacco products/ Avoid exposure to second hand smoke  Surgeon General's Warning:  Quitting smoking now greatly reduces serious risk to your health. Obesity, smoking, and sedentary lifestyle greatly increases your risk for illness    A healthy diet, regular physical exercise & weight monitoring are important for maintaining a healthy lifestyle    You may be retaining fluid if you have a history of heart failure or if you experience any of the following symptoms:  Weight gain of 3 pounds or more overnight or 5 pounds in a week, increased swelling in our hands or feet or shortness of breath while lying flat in bed. Please call your doctor as soon as you notice any of these symptoms; do not wait until your next office visit. The discharge information has been reviewed with the patient. The patient verbalized understanding. Discharge medications reviewed with the patient and appropriate educational materials and side effects teaching were provided.   ___________________________________________________________________________________________________________________________________

## 2021-06-20 NOTE — DISCHARGE SUMMARY
Obstetrical Discharge Summary     Name: Cristin Teresa MRN: [id number]  SSN: [id number]    YOB: 1990  Age: 28 y.o. Sex: female      Allergies: Ceclor [cefaclor] and Sulfa (sulfonamide antibiotics)    Admit Date: 2021    Discharge Date: 2021     Admitting Physician: Mel Collins MD     Attending Physician:  Rasheed Valencia MD     * Admission Diagnoses: History of  section [R44.284]; Preeclampsia [O14.90]; Pre-eclampsia [O14.90];  delivery delivered [O82]    * Discharge Diagnoses:   Information for the patient's :  Yamila Cloud [[id number]]   Delivery of a 8 lb 1.1 oz (3.66 kg) male infant via , Low Transverse on 2021 at 10:51 AM  by Dustin Prince. Apgars were 8  and 9 . Additional Diagnoses:   Hospital Problems as of 2021 Date Reviewed: 2021        Codes Class Noted - Resolved POA     delivery delivered ICD-10-CM: O82  ICD-9-CM: 669.71  2021 - Present Unknown        Pre-eclampsia ICD-10-CM: O14.90  ICD-9-CM: 642.40  2021 - Present Unknown        Preeclampsia ICD-10-CM: O14.90  ICD-9-CM: 642.40  2021 - Present Unknown        * (Principal) Chronic benign essential hypertension in second trimester ICD-10-CM: O10.012  ICD-9-CM: 642.03  2020 - Present Yes    Overview Addendum 2021 10:59 AM by Enrique HAMILTON DO     Taking Labetalol 100 mg bid. Baseline labs-  24 hour urine-    2021 at [facility]: Takes Labetalol 100mg BID. /88. Denies PreE symptoms  21-Pre-E labs P/C ratio-208-per Alt collect 24 hr urine at next visit. 3/25/2021 at [facility]: Appropriate fetal growth, Normal anatomy/echo; AC 77%, Overall 65%, SHAYY 24.5 cm (DVP 8 cm).  21-Pre-E labs P/C ratio-204   No f/u with MFM, refer back PRN. · Growth at 32 weeks with primary OB. · Start twice weekly testing with primary OB at 32 weeks.   · Get baseline preeclamptic labs in your office at next appointment:  (CBC, CMP, LDH, Uric Acid); also do P/C ratio (if elevated, need to then confirm with 24h urine) or 24 hour urine (for total protein and creatinine clearance). Repeat for elevated BP or symptoms. · Preeclamptic warnings given  · Treatment of chronic HTN is recommended to maintain blood pressure in normal to mild range (<160/<110). Increase Labetalol to 200mg BID for >150/100. · Low dose Aspirin (162 mg daily qhs) is recommended to be started by 12-16 weeks for the prevention of preeclampsia in high risk women; some benefit seen with starting up to 28 weeks. · Will need serial growth ultrasounds in third trimester, as well as  testing to monitor maternal and fetal well-being                    Lab Results   Component Value Date/Time    ABO/Rh(D) B POSITIVE 2021 06:23 AM    Rubella, External Immune 2020 12:00 AM    ABO,Rh B positive 2020 12:00 AM      Immunization History   Administered Date(s) Administered    Influenza Vaccine 09/10/2020       * Procedures: csec rep  Procedure(s):   SECTION           * Discharge Condition: improved    * Hospital Course: Normal hospital course following the delivery. Post partum htn and edema treated with lasix and labetalol    * Disposition: Home    Discharge Medications:   Current Discharge Medication List      START taking these medications    Details   furosemide (LASIX) 20 mg tablet One po daily for 3 more days  Qty: 3 Tablet, Refills: 0  Start date: 2021      ibuprofen (MOTRIN) 800 mg tablet Take 1 Tablet by mouth every eight (8) hours as needed for Pain. Qty: 30 Tablet, Refills: 0  Start date: 2021      oxyCODONE IR (ROXICODONE) 5 mg immediate release tablet Take 1 Tablet by mouth every four (4) hours as needed for Pain for up to 3 days.  Max Daily Amount: 30 mg.  Qty: 20 Tablet, Refills: 0  Start date: 2021, End date: 2021    Associated Diagnoses:  delivery delivered         CONTINUE these medications which have CHANGED    Details labetaloL (NORMODYNE) 100 mg tablet Take 1 Tablet by mouth every twelve (12) hours. Qty: 60 Tablet, Refills: 1  Start date: 6/20/2021         CONTINUE these medications which have NOT CHANGED    Details   buPROPion SR (Wellbutrin SR) 150 mg SR tablet Take  by mouth two (2) times a day. ferrous sulfate (Iron) 325 mg (65 mg iron) tablet Take  by mouth Daily (before breakfast). cholecalciferol, vitamin D3, (Vitamin D3) 50 mcg (2,000 unit) tab Take  by mouth.      prenatal vit,darrell 74/iron/folic (PRENATAL VITAMIN 1+1 PO) Take  by mouth. STOP taking these medications       fluconazole (Diflucan) 150 mg tablet Comments:   Reason for Stopping:         hydrocortisone (ANUSOL-HC) 2.5 % rectal cream Comments:   Reason for Stopping:         CALCIUM PO Comments:   Reason for Stopping:         calcium carbonate (TUMS) 200 mg calcium (500 mg) chew Comments:   Reason for Stopping:               * Follow-up Care/Patient Instructions:   Activity: No sex for 6 weeks      Follow-up Information     Follow up With Specialties Details Why Contact Info    Jennifer Tarango MD Family Medicine   [address] [place] [address]  [phone]

## 2022-03-18 PROBLEM — O09.92 HIGH-RISK PREGNANCY IN SECOND TRIMESTER: Status: ACTIVE | Noted: 2020-12-01

## 2022-03-18 PROBLEM — O14.90 PREECLAMPSIA: Status: ACTIVE | Noted: 2021-06-08

## 2022-03-18 PROBLEM — Z87.59 HISTORY OF CHOLESTASIS DURING PREGNANCY: Status: ACTIVE | Noted: 2020-12-02

## 2022-03-18 PROBLEM — O99.212 OBESITY AFFECTING PREGNANCY IN SECOND TRIMESTER: Status: ACTIVE | Noted: 2021-03-24

## 2022-03-18 PROBLEM — Z87.19 HISTORY OF CHOLESTASIS DURING PREGNANCY: Status: ACTIVE | Noted: 2020-12-02

## 2022-03-19 PROBLEM — O09.892 HISTORY OF PRETERM DELIVERY, CURRENTLY PREGNANT IN SECOND TRIMESTER: Status: ACTIVE | Noted: 2021-03-25

## 2022-03-19 PROBLEM — O99.342 DEPRESSION AFFECTING PREGNANCY IN SECOND TRIMESTER, ANTEPARTUM: Status: ACTIVE | Noted: 2020-12-01

## 2022-03-19 PROBLEM — O34.219 PREVIOUS CESAREAN SECTION COMPLICATING PREGNANCY: Status: ACTIVE | Noted: 2020-12-01

## 2022-03-19 PROBLEM — F32.A DEPRESSION AFFECTING PREGNANCY IN SECOND TRIMESTER, ANTEPARTUM: Status: ACTIVE | Noted: 2020-12-01

## 2022-03-19 PROBLEM — O14.90 PRE-ECLAMPSIA: Status: ACTIVE | Noted: 2021-06-09

## 2022-03-20 PROBLEM — Z87.59 HISTORY OF TWIN PREGNANCY IN PRIOR PREGNANCY: Status: ACTIVE | Noted: 2021-03-25

## 2022-03-20 PROBLEM — O10.012 CHRONIC BENIGN ESSENTIAL HYPERTENSION IN SECOND TRIMESTER: Status: ACTIVE | Noted: 2020-12-01

## 2022-03-20 PROBLEM — O99.019 ANEMIA IN PREGNANCY: Status: ACTIVE | Noted: 2021-04-29

## 2022-06-13 ENCOUNTER — OFFICE VISIT (OUTPATIENT)
Dept: FAMILY MEDICINE CLINIC | Facility: CLINIC | Age: 32
End: 2022-06-13
Payer: COMMERCIAL

## 2022-06-13 VITALS
SYSTOLIC BLOOD PRESSURE: 130 MMHG | OXYGEN SATURATION: 98 % | HEART RATE: 66 BPM | RESPIRATION RATE: 16 BRPM | DIASTOLIC BLOOD PRESSURE: 80 MMHG | WEIGHT: 250 LBS | BODY MASS INDEX: 37.89 KG/M2 | HEIGHT: 68 IN | TEMPERATURE: 97.8 F

## 2022-06-13 DIAGNOSIS — G43.709 CHRONIC MIGRAINE WITHOUT AURA WITHOUT STATUS MIGRAINOSUS, NOT INTRACTABLE: Primary | ICD-10-CM

## 2022-06-13 DIAGNOSIS — I10 ESSENTIAL HYPERTENSION: ICD-10-CM

## 2022-06-13 PROCEDURE — 99214 OFFICE O/P EST MOD 30 MIN: CPT | Performed by: PHYSICIAN ASSISTANT

## 2022-06-13 RX ORDER — CLONIDINE 0.1 MG/24H
1 PATCH, EXTENDED RELEASE TRANSDERMAL WEEKLY
Qty: 4 PATCH | Refills: 5 | Status: SHIPPED | OUTPATIENT
Start: 2022-06-13

## 2022-06-13 RX ORDER — NADOLOL 20 MG/1
TABLET ORAL
COMMUNITY
Start: 2022-05-17

## 2022-06-13 RX ORDER — CLONIDINE 0.1 MG/24H
1 PATCH, EXTENDED RELEASE TRANSDERMAL WEEKLY
COMMUNITY
Start: 2022-05-21 | End: 2022-06-13 | Stop reason: SDUPTHER

## 2022-06-13 RX ORDER — BUTALBITAL, ACETAMINOPHEN AND CAFFEINE 50; 325; 40 MG/1; MG/1; MG/1
1 TABLET ORAL EVERY 4 HOURS PRN
Qty: 30 TABLET | Refills: 2 | Status: ON HOLD | OUTPATIENT
Start: 2022-06-13 | End: 2022-10-26 | Stop reason: HOSPADM

## 2022-06-13 RX ORDER — ELETRIPTAN HYDROBROMIDE 20 MG/1
TABLET, FILM COATED ORAL
COMMUNITY
Start: 2022-05-04 | End: 2022-08-16

## 2022-06-13 ASSESSMENT — PATIENT HEALTH QUESTIONNAIRE - PHQ9
SUM OF ALL RESPONSES TO PHQ QUESTIONS 1-9: 0
SUM OF ALL RESPONSES TO PHQ9 QUESTIONS 1 & 2: 0
2. FEELING DOWN, DEPRESSED OR HOPELESS: 0
5. POOR APPETITE OR OVEREATING: 0
9. THOUGHTS THAT YOU WOULD BE BETTER OFF DEAD, OR OF HURTING YOURSELF: 0
3. TROUBLE FALLING OR STAYING ASLEEP: 0
4. FEELING TIRED OR HAVING LITTLE ENERGY: 0
SUM OF ALL RESPONSES TO PHQ QUESTIONS 1-9: 0
10. IF YOU CHECKED OFF ANY PROBLEMS, HOW DIFFICULT HAVE THESE PROBLEMS MADE IT FOR YOU TO DO YOUR WORK, TAKE CARE OF THINGS AT HOME, OR GET ALONG WITH OTHER PEOPLE: 0
SUM OF ALL RESPONSES TO PHQ QUESTIONS 1-9: 0
1. LITTLE INTEREST OR PLEASURE IN DOING THINGS: 0
6. FEELING BAD ABOUT YOURSELF - OR THAT YOU ARE A FAILURE OR HAVE LET YOURSELF OR YOUR FAMILY DOWN: 0
7. TROUBLE CONCENTRATING ON THINGS, SUCH AS READING THE NEWSPAPER OR WATCHING TELEVISION: 0
8. MOVING OR SPEAKING SO SLOWLY THAT OTHER PEOPLE COULD HAVE NOTICED. OR THE OPPOSITE, BEING SO FIGETY OR RESTLESS THAT YOU HAVE BEEN MOVING AROUND A LOT MORE THAN USUAL: 0
SUM OF ALL RESPONSES TO PHQ QUESTIONS 1-9: 0

## 2022-06-13 NOTE — PATIENT INSTRUCTIONS
*A referral has been placed to neurology. They should contact you in the next 7-10 days to schedule. Please let us know if you do not hear from them. *Continue with nadolol daily. *Try the sample of Nurtec. Take 1 at onset of migraine. Take note if it has any effect on the headache and to what degree if it does. Keep the headache diary he has suggested by Dr. Rhonda Molina last visit. Follow-up with her as scheduled. *You can use the Fioricet as needed for the migraines. *Continue the clonidine patch as prescribed. I would recommend that you monitor blood pressure at home at least 3 to 4 days a week. Record readings and bring these to next visit for review. Call if consistently above 140/90.

## 2022-06-13 NOTE — PROGRESS NOTES
[facility]  Phone [phone]      Patient: Sakshi Vanegas  YOB: 1990  Age 28 y.o. Sex female  Medical Record:  [id number]  Visit Date: 06/13/22  Author:  David Brar PA-C    Family Practice Clinic Note    Chief Complaint   Patient presents with    Follow-up     headache not any better , still having one a week but it will last serveral days and none of the medications are helping     Medication Refill     pt requested a refill on the clonidine patches , given by another provider        History of Present Illness  This is a 80-year-old female who presents today with complaints of worsening headaches. She has a long history of migraine headaches. She notes that she has been suffering from them since she was a teenager. Over the past 4 to 5 months, however, the migraines have become more frequent and intense. Headaches are lasting several days at a time. Notes that typically they start on the right side of her head. She will have a sharp stabbing pain in the right ear right side of her face. She has had some associated fatigue and nausea. Denies fever, no chest pressure, no orthopnea, no palpitations, no shortness of breath, no weakness, no tingling, no dizziness, no visual change, no vomiting. She notes that over the years she has tried numerous triptan medications without much relief. She has had some mild improvement at times with Imitrex but Maxalt and Relpax were ineffective. Over-the-counter anti-inflammatories may help at times but only to a small degree. She notes that she has used Fioricet in the past which is provided the most relief. Recently placed on nadolol for prophylaxis. Recently seen at urgent care for severe headache. Blood pressure was noted to be quite high at that visit and she was given a prescription for clonidine patches.   She admits that her blood pressure has been elevated in the recent past. (RELPAX) 20 MG tablet TAKE 1 TABLET EVERY DAY FOR MIGRAINE UP TO 1 DOSE MAY REPEAT IN 2 HOURS IF NECESSARY (Patient not taking: Reported on 2022)       No current facility-administered medications for this visit. Allergies: Allergies   Allergen Reactions    Cefaclor Other (See Comments)    Sulfa Antibiotics Other (See Comments)       Surgical History:  Past Surgical History:   Procedure Laterality Date     SECTION  2021    c section       Family History:  Family History   Problem Relation Age of Onset    Hypertension Maternal Grandmother     Heart Disease Maternal Grandmother     Hypertension Maternal Grandfather     Heart Disease Maternal Grandfather     No Known Problems Father     Hypertension Mother        Social History:   Social History     Social History Narrative    Not on file      Social History     Socioeconomic History    Marital status:      Spouse name: Not on file    Number of children: Not on file    Years of education: Not on file    Highest education level: Not on file   Occupational History    Not on file   Tobacco Use    Smoking status: Never Smoker    Smokeless tobacco: Never Used   Substance and Sexual Activity    Alcohol use: No    Drug use: No    Sexual activity: Not on file   Other Topics Concern    Not on file   Social History Narrative    Not on file     Social Determinants of Health     Financial Resource Strain:     Difficulty of Paying Living Expenses: Not on file   Food Insecurity:     Worried About Running Out of Food in the Last Year: Not on file    Khris of Food in the Last Year: Not on file   Transportation Needs:     Lack of Transportation (Medical): Not on file    Lack of Transportation (Non-Medical):  Not on file   Physical Activity:     Days of Exercise per Week: Not on file    Minutes of Exercise per Session: Not on file   Stress:     Feeling of Stress : Not on file   Social Connections:     Frequency of Communication with Friends and Family: Not on file    Frequency of Social Gatherings with Friends and Family: Not on file    Attends [employer] Services: Not on file    Active Member of Clubs or Organizations: Not on file    Attends Club or Organization Meetings: Not on file    Marital Status: Not on file   Intimate Partner Violence:     Fear of Current or Ex-Partner: Not on file    Emotionally Abused: Not on file    Physically Abused: Not on file    Sexually Abused: Not on file   Housing Stability:     Unable to Pay for Housing in the Last Year: Not on file    Number of [place] in the Last Year: Not on file    Unstable Housing in the Last Year: Not on file         ROS  Review of Systems   Constitutional: Positive for fatigue. Negative for chills and fever. Eyes: Negative for visual disturbance. Respiratory: Negative for shortness of breath. Cardiovascular: Negative for chest pain and palpitations. Neurological: Positive for headaches. Negative for dizziness, speech difficulty and light-headedness. Psychiatric/Behavioral: Negative for confusion. /80 (Site: Left Upper Arm, Position: Sitting, Cuff Size: Large Adult)   Pulse 66   Temp 97.8 °F (36.6 °C) (Temporal)   Resp 16   Ht 5' 8\" (1.727 m)   Wt 250 lb (113.4 kg)   LMP 03/13/2022   SpO2 98%   BMI 38.01 kg/m²   Body mass index is 38.01 kg/m². Physical Exam    Physical Exam  Vitals and nursing note reviewed. Constitutional:       General: She is not in acute distress. Appearance: Normal appearance. She is not ill-appearing. HENT:      Head: Normocephalic. Right Ear: Tympanic membrane, ear canal and external ear normal.      Left Ear: Tympanic membrane, ear canal and external ear normal.      Nose: Nose normal.      Mouth/Throat:      Pharynx: Oropharynx is clear. Eyes:      Extraocular Movements: Extraocular movements intact.       Conjunctiva/sclera: Conjunctivae normal.      Pupils: Pupils are equal, round, and reactive to light. Cardiovascular:      Rate and Rhythm: Normal rate and regular rhythm. Heart sounds: Normal heart sounds. Pulmonary:      Effort: Pulmonary effort is normal.      Breath sounds: Normal breath sounds. Musculoskeletal:      Cervical back: Neck supple. Lymphadenopathy:      Cervical: No cervical adenopathy. Neurological:      General: No focal deficit present. Mental Status: She is alert. Psychiatric:         Mood and Affect: Mood normal.         Behavior: Behavior normal.         Thought Content: Thought content normal.         ASSESSMENT & PLAN    ICD-10-CM    1. Chronic migraine without aura without status migrainosus, not intractable  G43.709 [address]     butalbital-acetaminophen-caffeine (FIORICET, ESGIC) -40 MG per tablet   2. Essential hypertension  I10 cloNIDine (CATAPRES) 0.1 MG/24HR PTWK        1. Chronic migraine without aura without status migrainosus, not intractable  *A referral has been placed to neurology. They should contact you in the next 7-10 days to schedule. Please let us know if you do not hear from them. *Continue with nadolol daily. *Try the sample of Nurtec. Take 1 at onset of migraine. Take note if it has any effect on the headache and to what degree if it does. Keep the headache diary he has suggested by Dr. Ton Dill last visit. Follow-up with her as scheduled. *Refill of Fioricet given to use as needed for the migraines.  - [address]  - butalbital-acetaminophen-caffeine (FIORICET, ESGIC) -40 MG per tablet; Take 1 tablet by mouth every 4 hours as needed for Headaches  Dispense: 30 tablet; Refill: 2    2. Essential hypertension  *Appears to be well controlled on present regimen. Refill of the clonidine patches given. - cloNIDine (CATAPRES) 0.1 MG/24HR PTWK; Place 1 patch onto the skin once a week  Dispense: 4 patch;  Refill: 5      Orders Placed This Encounter Procedures    [address] Neurology [facility]     Referral Priority:   Routine     Referral Type:   Eval and Treat     Referral Reason:   Specialty Services Required     Requested Specialty:   Neurology     Number of Visits Requested:   1     I have reviewed the patient's past medical history, social history and family history and vitals. We have discussed treatment plan and follow up and given patient instructions. Patient's questions are answered and we will follow up as indicated. Dictated using voice recognition software. Proof read but unrecognized errors may exist.    Follow-up and Dispositions    · Return As previously scheduled, 8/2022, with Dr. Holden Crain.          Gino Marte PA-C

## 2022-06-14 ASSESSMENT — ENCOUNTER SYMPTOMS: SHORTNESS OF BREATH: 0

## 2022-07-22 ENCOUNTER — TELEPHONE (OUTPATIENT)
Dept: FAMILY MEDICINE CLINIC | Facility: CLINIC | Age: 32
End: 2022-07-22

## 2022-07-22 DIAGNOSIS — U07.1 COVID-19: Primary | ICD-10-CM

## 2022-07-22 NOTE — TELEPHONE ENCOUNTER
Prescription(s) refilled  It (they) has been escribed to the pharmacy. Requested Prescriptions     Signed Prescriptions Disp Refills    nirmatrelvir/ritonavir (PAXLOVID) 20 x 150 MG & 10 x 100MG 30 tablet 0     Sig: Take 3 tablets (two 150 mg nirmatrelvir and one 100 mg ritonavir tablets) by mouth every 12 hours for 5 days. Authorizing Provider: WHIT GALVEZ     No orders of the defined types were placed in this encounter.           ICD-10-CM    1. COVID-19  U07.1 nirmatrelvir/ritonavir (PAXLOVID) 20 x 150 MG & 10 x 100MG

## 2022-07-22 NOTE — TELEPHONE ENCOUNTER
Received a call from the patient's  states that the patient has covid and is requesting medication be sent in for the patient.  Pt has previously used [facility] pharmacy in [facility]

## 2022-10-24 ENCOUNTER — HOSPITAL ENCOUNTER (EMERGENCY)
Age: 32
Discharge: HOME | End: 2022-10-24
Attending: EMERGENCY MEDICINE
Payer: COMMERCIAL

## 2022-10-24 ENCOUNTER — APPOINTMENT (OUTPATIENT)
Dept: GENERAL RADIOLOGY | Age: 32
End: 2022-10-24
Payer: COMMERCIAL

## 2022-10-24 VITALS
DIASTOLIC BLOOD PRESSURE: 108 MMHG | HEART RATE: 80 BPM | TEMPERATURE: 98.6 F | OXYGEN SATURATION: 100 % | WEIGHT: 240 LBS | RESPIRATION RATE: 18 BRPM | HEIGHT: 68 IN | BODY MASS INDEX: 36.37 KG/M2 | SYSTOLIC BLOOD PRESSURE: 154 MMHG

## 2022-10-24 DIAGNOSIS — S82.892A CLOSED FRACTURE OF LEFT ANKLE, INITIAL ENCOUNTER: Primary | ICD-10-CM

## 2022-10-24 PROCEDURE — 6370000000 HC RX 637 (ALT 250 FOR IP): Performed by: EMERGENCY MEDICINE

## 2022-10-24 PROCEDURE — 73610 X-RAY EXAM OF ANKLE: CPT

## 2022-10-24 PROCEDURE — 29515 APPLICATION SHORT LEG SPLINT: CPT

## 2022-10-24 PROCEDURE — 6360000002 HC RX W HCPCS: Performed by: EMERGENCY MEDICINE

## 2022-10-24 PROCEDURE — 99284 EMERGENCY DEPT VISIT MOD MDM: CPT

## 2022-10-24 PROCEDURE — 96372 THER/PROPH/DIAG INJ SC/IM: CPT

## 2022-10-24 RX ORDER — NALBUPHINE HCL 10 MG/ML
10 AMPUL (ML) INJECTION
Status: COMPLETED | OUTPATIENT
Start: 2022-10-24 | End: 2022-10-24

## 2022-10-24 RX ORDER — ONDANSETRON 4 MG/1
4 TABLET, ORALLY DISINTEGRATING ORAL
Status: COMPLETED | OUTPATIENT
Start: 2022-10-24 | End: 2022-10-24

## 2022-10-24 RX ORDER — HYDROCODONE BITARTRATE AND ACETAMINOPHEN 5; 325 MG/1; MG/1
1 TABLET ORAL EVERY 4 HOURS PRN
Qty: 18 TABLET | Refills: 0 | Status: ON HOLD | OUTPATIENT
Start: 2022-10-24 | End: 2022-10-26 | Stop reason: HOSPADM

## 2022-10-24 RX ADMIN — NALBUPHINE HYDROCHLORIDE 10 MG: 10 INJECTION, SOLUTION INTRAMUSCULAR; INTRAVENOUS; SUBCUTANEOUS at 22:43

## 2022-10-24 RX ADMIN — ONDANSETRON 4 MG: 4 TABLET, ORALLY DISINTEGRATING ORAL at 22:42

## 2022-10-24 ASSESSMENT — PAIN SCALES - GENERAL
PAINLEVEL_OUTOF10: 8
PAINLEVEL_OUTOF10: 9

## 2022-10-24 ASSESSMENT — PAIN DESCRIPTION - LOCATION
LOCATION: ANKLE
LOCATION: LEG

## 2022-10-24 ASSESSMENT — PAIN DESCRIPTION - ORIENTATION
ORIENTATION: LEFT
ORIENTATION: LEFT

## 2022-10-24 ASSESSMENT — PAIN DESCRIPTION - DESCRIPTORS
DESCRIPTORS: THROBBING
DESCRIPTORS: ACHING

## 2022-10-24 ASSESSMENT — PAIN - FUNCTIONAL ASSESSMENT: PAIN_FUNCTIONAL_ASSESSMENT: 0-10

## 2022-10-24 ASSESSMENT — PAIN DESCRIPTION - FREQUENCY: FREQUENCY: CONTINUOUS

## 2022-10-24 ASSESSMENT — PAIN DESCRIPTION - PAIN TYPE: TYPE: ACUTE PAIN

## 2022-10-25 ENCOUNTER — PREP FOR PROCEDURE (OUTPATIENT)
Dept: ORTHOPEDIC SURGERY | Age: 32
End: 2022-10-25

## 2022-10-25 ENCOUNTER — OFFICE VISIT (OUTPATIENT)
Dept: ORTHOPEDIC SURGERY | Age: 32
End: 2022-10-25
Payer: COMMERCIAL

## 2022-10-25 DIAGNOSIS — S82.842A BIMALLEOLAR ANKLE FRACTURE, LEFT, CLOSED, INITIAL ENCOUNTER: Primary | ICD-10-CM

## 2022-10-25 DIAGNOSIS — Z09 HOSPITAL DISCHARGE FOLLOW-UP: ICD-10-CM

## 2022-10-25 PROCEDURE — 99203 OFFICE O/P NEW LOW 30 MIN: CPT | Performed by: ORTHOPAEDIC SURGERY

## 2022-10-25 PROCEDURE — 1111F DSCHRG MED/CURRENT MED MERGE: CPT | Performed by: ORTHOPAEDIC SURGERY

## 2022-10-25 RX ORDER — OXYCODONE HYDROCHLORIDE AND ACETAMINOPHEN 5; 325 MG/1; MG/1
2 TABLET ORAL EVERY 4 HOURS PRN
Qty: 8 TABLET | Refills: 0 | Status: SHIPPED | OUTPATIENT
Start: 2022-10-25 | End: 2022-10-30

## 2022-10-25 NOTE — PROGRESS NOTES
PLEASE CONTINUE TAKING ALL PRESCRIPTION MEDICATIONS UP TO THE DAY OF SURGERY UNLESS OTHERWISE DIRECTED BELOW. DISCONTINUE all vitamins and supplements 7 days prior to surgery. DISCONTINUE Non-Steriodal Anti-Inflammatory (NSAIDS) such as Advil and Aleve 5 days prior to surgery. Home Medications to take  the day of surgery    Clonidine, if needed norco, imitrex, fioricet           Home Medications   to Hold   advil        Comments                Please do not bring home medications with you on the day of surgery unless otherwise directed by your nurse. If you are instructed to bring home medications, please give them to your nurse as they will be administered by the nursing staff. If you have any questions, please call [facility] [phone] or [address] [place] [address] [phone]. A copy of this note was provided to the patient for reference.

## 2022-10-25 NOTE — PROGRESS NOTES
a         History and physical    Patient: Eveline Carmona MRN: [id number]  SSN: [id number]    YOB: 1990  Age: 33 y.o. Sex: female      Subjective:      Eveline Carmona is a 33 y.o. female who just suffered a fall on some leaves and fell awkwardly twisting her left ankle. She was holding her 3year-old at that time so she thinks this is part of why she fell so awkwardly. She is a pretty healthy individual otherwise. She has some hypertension but otherwise very minimal past medical history. She is active. 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 She denies any other problems besides her left ankle. She was seen in the emergency room and found to have a displaced left bimalleolar ankle fracture and splinted and is here today for follow-up. Past Medical History:   Diagnosis Date    ADHD     Anemia     Depression 2016    Hypertension     Migraine without aura and without status migrainosus, not intractable     Polycystic disease, ovaries     Preeclampsia 2021     delivery     Recurrent UTI      Past Surgical History:   Procedure Laterality Date     SECTION  2021    c section      FAMHX -No history of inflammatory arthritis   Social History     Tobacco Use    Smoking status: Never    Smokeless tobacco: Never   Substance Use Topics    Alcohol use: No      Current Outpatient Medications   Medication Sig Dispense Refill    oxyCODONE-acetaminophen (PERCOCET) 5-325 MG per tablet Take 2 tablets by mouth every 4 hours as needed for Pain for up to 5 days. Intended supply: 5 days. Take lowest dose possible to manage pain 8 tablet 0    HYDROcodone-acetaminophen (NORCO) 5-325 MG per tablet Take 1 tablet by mouth every 4 hours as needed for Pain for up to 3 days. Intended supply: 3 days.  Take lowest dose possible to manage pain 18 tablet 0    nadolol (CORGARD) 20 MG tablet TAKE 1 TABLET BY MOUTH AT BEDTIME FOR MIGRAINE PREVENTION      cloNIDine (CATAPRES) 0.1 MG/24HR PTWK Place 1 patch onto the skin once a week 4 patch 5    butalbital-acetaminophen-caffeine (FIORICET, ESGIC) -40 MG per tablet Take 1 tablet by mouth every 4 hours as needed for Headaches 30 tablet 2    buPROPion (WELLBUTRIN SR) 150 MG extended release tablet Take by mouth 2 times daily      ibuprofen (ADVIL;MOTRIN) 800 MG tablet Take 800 mg by mouth every 8 hours as needed      SUMAtriptan (IMITREX) 100 MG tablet 1/2 - 1 po @@ onset of migraine, may repeat in 2h prn       No current facility-administered medications for this visit. Allergies   Allergen Reactions    Cefaclor Other (See Comments)    Sulfa Antibiotics Other (See Comments)       Review of Systems:  A comprehensive review of systems was negative. Objective: There were no vitals filed for this visit. Physical Exam:  Physical Exam:  General:  Alert, cooperative, no distress, appears stated age. Orientation she is alert and oriented person place time and situation   Eyes:  Conjunctivae/corneas clear. PERRL, EOMs intact. Fundi benign   Ears:  Normal TMs and external ear canals both ears. Nose: Nares normal. Septum midline. Mucosa normal. No drainage or sinus tenderness. Mouth/Throat: Lips, mucosa, and tongue normal. Teeth and gums normal.   Neck: Supple, symmetrical, trachea midline, no adenopathy, thyroid: no enlargment/tenderness/nodules, no carotid bruit and no JVD. Back:   Symmetric, no curvature. ROM normal. No CVA tenderness. Lungs:   Clear to auscultation bilaterally. Heart:  Regular rate and rhythm, S1, S2 normal, no murmur, click, rub or gallop. Abdomen:   Soft, non-tender. Bowel sounds normal. No masses,  No organomegaly.      No lymphadenopathy in all 4 extremities    Alignment- pt has some obvious deformity of the left ankle    Range of motion- pain with any range of motion left ankle  Tenderness to palpation over the medial and lateral malleoli  Vascular-distal pulses palpable in LEFT LOWER  Sensory/motor-deep tendon reflexes normal LEFT LOWER. Motor and sensory function intact. Stability- is difficult to assess stability because of the presence of the fracture      Skin- no rashes ulcerations or open wounds LEFT LOWER    Gait-pt cannot put any weight on the LEFT LOWER  Assessment:     [unfilled]  Left closed displaced bimalleolar ankle fracture    Xrays and or studies:    I have reviewed x-rays which shows a displaced left bimalleolar ankle fracture. She does have a small butterfly fragment in her area of her lateral malleolus. There is some mild displacement of both of these  Plan:     I have spoke with the patient regarding the fact that I do think it is reasonable to treat this operatively. I briefly explained what nonoperative treatment would involve and make sure she understands I would elaborate on this if she thinks she is interested in nonoperative treatment. After talking her about this I think she is willing to go ahead and proceed with operative intervention. I have explained to her exactly what this would involve. I have used some illustrations to help her understand this. I reviewed her x-rays with her. After talking with her and her family regarding this the plan is to proceed with formal open reduction internal fixation of left bimalleolar ankle fracture as an outpatient this week.   I have reviewed the patient's history of controlled substance prescriptions in the [place] prescription drug monitoring program.    Signed By: Manju Truong MD     October 25, 2022

## 2022-10-25 NOTE — H&P (VIEW-ONLY)
History and physical    Patient: Gonsalo Wayne MRN: [id number]  SSN: [id number]    YOB: 1990  Age: 33 y.o. Sex: female      Subjective:      Gonsalo Wayne is a 33 y.o. female who just suffered a fall on some leaves and fell awkwardly twisting her left ankle. She was holding her 3year-old at that time so she thinks this is part of why she fell so awkwardly. She is a pretty healthy individual otherwise. She has some hypertension but otherwise very minimal past medical history. She is active. 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 She denies any other problems besides her left ankle. She was seen in the emergency room and found to have a displaced left bimalleolar ankle fracture and splinted and is here today for follow-up. Past Medical History:   Diagnosis Date    ADHD     Anemia     Depression 2016    Hypertension     Migraine without aura and without status migrainosus, not intractable     Polycystic disease, ovaries     Preeclampsia 2021     delivery     Recurrent UTI      Past Surgical History:   Procedure Laterality Date     SECTION  2021    c section      FAMHX -No history of inflammatory arthritis   Social History     Tobacco Use    Smoking status: Never    Smokeless tobacco: Never   Substance Use Topics    Alcohol use: No      Current Outpatient Medications   Medication Sig Dispense Refill    oxyCODONE-acetaminophen (PERCOCET) 5-325 MG per tablet Take 2 tablets by mouth every 4 hours as needed for Pain for up to 5 days. Intended supply: 5 days. Take lowest dose possible to manage pain 8 tablet 0    HYDROcodone-acetaminophen (NORCO) 5-325 MG per tablet Take 1 tablet by mouth every 4 hours as needed for Pain for up to 3 days. Intended supply: 3 days.  Take lowest dose possible to manage pain 18 tablet 0    nadolol (CORGARD) 20 MG tablet TAKE 1 TABLET BY MOUTH AT BEDTIME FOR MIGRAINE PREVENTION      cloNIDine (CATAPRES) 0.1 MG/24HR PTWK Place 1 patch onto the skin once a week 4 patch 5    butalbital-acetaminophen-caffeine (FIORICET, ESGIC) -40 MG per tablet Take 1 tablet by mouth every 4 hours as needed for Headaches 30 tablet 2    buPROPion (WELLBUTRIN SR) 150 MG extended release tablet Take by mouth 2 times daily      ibuprofen (ADVIL;MOTRIN) 800 MG tablet Take 800 mg by mouth every 8 hours as needed      SUMAtriptan (IMITREX) 100 MG tablet 1/2 - 1 po @@ onset of migraine, may repeat in 2h prn       No current facility-administered medications for this visit. Allergies   Allergen Reactions    Cefaclor Other (See Comments)    Sulfa Antibiotics Other (See Comments)       Review of Systems:  A comprehensive review of systems was negative. Objective: There were no vitals filed for this visit. Physical Exam:  Physical Exam:  General:  Alert, cooperative, no distress, appears stated age. Orientation she is alert and oriented person place time and situation   Eyes:  Conjunctivae/corneas clear. PERRL, EOMs intact. Fundi benign   Ears:  Normal TMs and external ear canals both ears. Nose: Nares normal. Septum midline. Mucosa normal. No drainage or sinus tenderness. Mouth/Throat: Lips, mucosa, and tongue normal. Teeth and gums normal.   Neck: Supple, symmetrical, trachea midline, no adenopathy, thyroid: no enlargment/tenderness/nodules, no carotid bruit and no JVD. Back:   Symmetric, no curvature. ROM normal. No CVA tenderness. Lungs:   Clear to auscultation bilaterally. Heart:  Regular rate and rhythm, S1, S2 normal, no murmur, click, rub or gallop. Abdomen:   Soft, non-tender. Bowel sounds normal. No masses,  No organomegaly.      No lymphadenopathy in all 4 extremities    Alignment- pt has some obvious deformity of the left ankle    Range of motion- pain with any range of motion left ankle  Tenderness to palpation over the medial and lateral malleoli  Vascular-distal pulses palpable in LEFT LOWER  Sensory/motor-deep tendon reflexes normal LEFT LOWER. Motor and sensory function intact. Stability- is difficult to assess stability because of the presence of the fracture      Skin- no rashes ulcerations or open wounds LEFT LOWER    Gait-pt cannot put any weight on the LEFT LOWER  Assessment:     [unfilled]  Left closed displaced bimalleolar ankle fracture    Xrays and or studies:    I have reviewed x-rays which shows a displaced left bimalleolar ankle fracture. She does have a small butterfly fragment in her area of her lateral malleolus. There is some mild displacement of both of these  Plan:     I have spoke with the patient regarding the fact that I do think it is reasonable to treat this operatively. I briefly explained what nonoperative treatment would involve and make sure she understands I would elaborate on this if she thinks she is interested in nonoperative treatment. After talking her about this I think she is willing to go ahead and proceed with operative intervention. I have explained to her exactly what this would involve. I have used some illustrations to help her understand this. I reviewed her x-rays with her. After talking with her and her family regarding this the plan is to proceed with formal open reduction internal fixation of left bimalleolar ankle fracture as an outpatient this week.   I have reviewed the patient's history of controlled substance prescriptions in the [place] prescription drug monitoring program.    Signed By: Marybeth Heard MD     October 25, 2022

## 2022-10-25 NOTE — ED TRIAGE NOTES
Arrives with face mask in place. Assisted into triage via wheelchair. S/p fall approx 1830 while carrying 2yo child. Swelling, bruising and deformity to left ankle. Pain radiating up shin. Denies hitting head or loss of consciousness.  Denies other injury

## 2022-10-25 NOTE — H&P
History and physical    Patient: Nena Guzman MRN: [id number]  SSN: [id number]    YOB: 1990  Age: 33 y.o. Sex: female      Subjective:      Nena Guzman is a 33 y.o. female who just suffered a fall on some leaves and fell awkwardly twisting her left ankle. She was holding her 3year-old at that time so she thinks this is part of why she fell so awkwardly. She is a pretty healthy individual otherwise. She has some hypertension but otherwise very minimal past medical history. She is active. 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 She denies any other problems besides her left ankle. She was seen in the emergency room and found to have a displaced left bimalleolar ankle fracture and splinted and is here today for follow-up. Past Medical History:   Diagnosis Date    ADHD     Anemia     Depression 2016    Hypertension     Migraine without aura and without status migrainosus, not intractable     Polycystic disease, ovaries     Preeclampsia 2021     delivery     Recurrent UTI      Past Surgical History:   Procedure Laterality Date     SECTION  2021    c section      FAMHX -No history of inflammatory arthritis   Social History     Tobacco Use    Smoking status: Never    Smokeless tobacco: Never   Substance Use Topics    Alcohol use: No      Current Outpatient Medications   Medication Sig Dispense Refill    oxyCODONE-acetaminophen (PERCOCET) 5-325 MG per tablet Take 2 tablets by mouth every 4 hours as needed for Pain for up to 5 days. Intended supply: 5 days. Take lowest dose possible to manage pain 8 tablet 0    HYDROcodone-acetaminophen (NORCO) 5-325 MG per tablet Take 1 tablet by mouth every 4 hours as needed for Pain for up to 3 days. Intended supply: 3 days.  Take lowest dose possible to manage pain 18 tablet 0    nadolol (CORGARD) 20 MG tablet TAKE 1 TABLET BY MOUTH AT BEDTIME FOR MIGRAINE PREVENTION      cloNIDine (CATAPRES) 0.1 MG/24HR PTWK Place 1 patch onto the skin once a week 4 patch 5    butalbital-acetaminophen-caffeine (FIORICET, ESGIC) -40 MG per tablet Take 1 tablet by mouth every 4 hours as needed for Headaches 30 tablet 2    buPROPion (WELLBUTRIN SR) 150 MG extended release tablet Take by mouth 2 times daily      ibuprofen (ADVIL;MOTRIN) 800 MG tablet Take 800 mg by mouth every 8 hours as needed      SUMAtriptan (IMITREX) 100 MG tablet 1/2 - 1 po @@ onset of migraine, may repeat in 2h prn       No current facility-administered medications for this visit. Allergies   Allergen Reactions    Cefaclor Other (See Comments)    Sulfa Antibiotics Other (See Comments)       Review of Systems:  A comprehensive review of systems was negative. Objective: There were no vitals filed for this visit. Physical Exam:  Physical Exam:  General:  Alert, cooperative, no distress, appears stated age. Orientation she is alert and oriented person place time and situation   Eyes:  Conjunctivae/corneas clear. PERRL, EOMs intact. Fundi benign   Ears:  Normal TMs and external ear canals both ears. Nose: Nares normal. Septum midline. Mucosa normal. No drainage or sinus tenderness. Mouth/Throat: Lips, mucosa, and tongue normal. Teeth and gums normal.   Neck: Supple, symmetrical, trachea midline, no adenopathy, thyroid: no enlargment/tenderness/nodules, no carotid bruit and no JVD. Back:   Symmetric, no curvature. ROM normal. No CVA tenderness. Lungs:   Clear to auscultation bilaterally. Heart:  Regular rate and rhythm, S1, S2 normal, no murmur, click, rub or gallop. Abdomen:   Soft, non-tender. Bowel sounds normal. No masses,  No organomegaly.      No lymphadenopathy in all 4 extremities    Alignment- pt has some obvious deformity of the left ankle    Range of motion- pain with any range of motion left ankle  Tenderness to palpation over the medial and lateral malleoli  Vascular-distal pulses palpable in LEFT LOWER  Sensory/motor-deep tendon reflexes normal LEFT LOWER. Motor and sensory function intact. Stability- is difficult to assess stability because of the presence of the fracture      Skin- no rashes ulcerations or open wounds LEFT LOWER    Gait-pt cannot put any weight on the LEFT LOWER  Assessment:     [unfilled]  Left closed displaced bimalleolar ankle fracture    Xrays and or studies:    I have reviewed x-rays which shows a displaced left bimalleolar ankle fracture. She does have a small butterfly fragment in her area of her lateral malleolus. There is some mild displacement of both of these  Plan:     I have spoke with the patient regarding the fact that I do think it is reasonable to treat this operatively. I briefly explained what nonoperative treatment would involve and make sure she understands I would elaborate on this if she thinks she is interested in nonoperative treatment. After talking her about this I think she is willing to go ahead and proceed with operative intervention. I have explained to her exactly what this would involve. I have used some illustrations to help her understand this. I reviewed her x-rays with her. After talking with her and her family regarding this the plan is to proceed with formal open reduction internal fixation of left bimalleolar ankle fracture as an outpatient this week.   I have reviewed the patient's history of controlled substance prescriptions in the [facility] prescription drug monitoring program.    Signed By: Jason Sanchez MD     October 25, 2022

## 2022-10-25 NOTE — ED NOTES
I have reviewed discharge instructions with the patient. The patient verbalized understanding. Patient left ED via Discharge Method: ambulatory to Home with . Opportunity for questions and clarification provided. Patient given 1 scripts. To continue your aftercare when you leave the hospital, you may receive an automated call from our care team to check in on how you are doing. This is a free service and part of our promise to provide the best care and service to meet your aftercare needs.  If you have questions, or wish to unsubscribe from this service please call [phone]. Thank you for Choosing our [facility] Emergency Department.        Hay Tom RN  10/24/22 8306

## 2022-10-25 NOTE — ED PROVIDER NOTES
Emergency Department Provider Note                   PCP:                Allan Werner MD               Age: 33 y.o. Sex: female     No diagnosis found. DISPOSITION          MDM     Amount and/or Complexity of Data Reviewed  Tests in the radiology section of CPT®: ordered and reviewed (X-ray positive for bimalleolar fracture.)  Independent visualization of images, tracings, or specimens: yes    Risk of Complications, Morbidity, and/or Mortality  Presenting problems: low  Diagnostic procedures: low  Management options: low    Patient Progress  Patient progress: stable             Orders Placed This Encounter   Procedures    XR ANKLE LEFT (MIN 3 VIEWS)        Medications   nalbuphine (NUBAIN) injection 10 mg (has no administration in time range)   ondansetron (ZOFRAN-ODT) disintegrating tablet 4 mg (has no administration in time range)       New Prescriptions    No medications on file        Lamberto Miranda is a 33 y.o. female who presents to the Emergency Department with chief complaint of    Chief Complaint   Patient presents with    Fall      Patient states that she was walking with carrying her 3year-old child when she slipped on some wet leaves and fell twisting her left ankle. She complains of pain to the left ankle as well as swelling and inability to bear weight. She denies any other injuries. The history is provided by the patient. Review of Systems   Neurological:  Negative for numbness.      Past Medical History:   Diagnosis Date    ADHD     Anemia     Depression 2016    Hypertension     Migraine without aura and without status migrainosus, not intractable     Polycystic disease, ovaries     Preeclampsia 2021     delivery     Recurrent UTI         Past Surgical History:   Procedure Laterality Date     SECTION  2021    c section        Family History   Problem Relation Age of Onset    Hypertension Maternal Grandmother     Heart Disease Maternal Grandmother Hypertension Maternal Grandfather     Heart Disease Maternal Grandfather     No Known Problems Father     Hypertension Mother         Social History     Socioeconomic History    Marital status:    Tobacco Use    Smoking status: Never    Smokeless tobacco: Never   Substance and Sexual Activity    Alcohol use: No    Drug use: No         Cefaclor and Sulfa antibiotics     Previous Medications    BUPROPION (WELLBUTRIN SR) 150 MG EXTENDED RELEASE TABLET    Take by mouth 2 times daily    BUTALBITAL-ACETAMINOPHEN-CAFFEINE (FIORICET, ESGIC) -40 MG PER TABLET    Take 1 tablet by mouth every 4 hours as needed for Headaches    CLONIDINE (CATAPRES) 0.1 MG/24HR PTWK    Place 1 patch onto the skin once a week    IBUPROFEN (ADVIL;MOTRIN) 800 MG TABLET    Take 800 mg by mouth every 8 hours as needed    NADOLOL (CORGARD) 20 MG TABLET    TAKE 1 TABLET BY MOUTH AT BEDTIME FOR MIGRAINE PREVENTION    SUMATRIPTAN (IMITREX) 100 MG TABLET    1/2 - 1 po @@ onset of migraine, may repeat in 2h prn        Vitals signs and nursing note reviewed. Patient Vitals for the past 4 hrs:   Temp Pulse Resp BP SpO2   10/24/22 2101 98.6 °F (37 °C) 74 16 (!) 164/113 100 %          Physical Exam  Vitals and nursing note reviewed. Constitutional:       General: She is not in acute distress. Appearance: Normal appearance. She is not ill-appearing, toxic-appearing or diaphoretic. HENT:      Head: Normocephalic and atraumatic. Eyes:      Extraocular Movements: Extraocular movements intact. Conjunctiva/sclera: Conjunctivae normal.      Pupils: Pupils are equal, round, and reactive to light. Musculoskeletal:         General: Swelling, tenderness and signs of injury present. No deformity. Right lower leg: No edema. Left lower leg: No edema. Comments: Semination of the left lower extremity demonstrates no proximal tenderness of the leg. There is swelling about the distal leg and ankle and foot.   Distal pulses are present. Capillary refills less than 2 seconds. No sign of compartment syndrome. Tenderness noted bilaterally to the malleoli. Bruising is present as well. Skin:     General: Skin is warm and dry. Capillary Refill: Capillary refill takes less than 2 seconds. Neurological:      General: No focal deficit present. Mental Status: She is alert and oriented to person, place, and time. Mental status is at baseline. Psychiatric:         Mood and Affect: Mood normal.         Behavior: Behavior normal.         Thought Content: Thought content normal.        Splint Application    Date/Time: 10/24/2022 10:25 PM  Performed by: Adis Licona DO  Authorized by: Adis Licona DO     Consent:     Consent obtained:  Verbal    Consent given by:  Patient    Risks discussed:  Swelling and pain  Universal protocol:     Procedure explained and questions answered to patient or proxy's satisfaction: yes    Pre-procedure details:     Distal neurologic exam:  Normal    Distal perfusion: distal pulses strong and brisk capillary refill    Procedure details:     Location:  Ankle    Ankle location:  L ankle    Splint type:  Short leg    Supplies:  Fiberglass    Attestation: Splint applied and adjusted personally by me    Post-procedure details:     Distal neurologic exam:  Normal    Distal perfusion: distal pulses strong      Procedure completion:  Tolerated    Post-procedure imaging: not applicable      No results found for any visits on 10/24/22. XR ANKLE LEFT (MIN 3 VIEWS)    (Results Pending)                       Voice dictation software was used during the making of this note. This software is not perfect and grammatical and other typographical errors may be present. This note has not been completely proofread for errors.      Adis Licona DO  10/24/22 2228

## 2022-10-25 NOTE — PROGRESS NOTES
Patient verified name and     Order for consent WAS found in EHR and matches case posting; patient verified. Type 1B surgery, PHONE assessment complete. Labs per surgeon: NONE  Labs per anesthesia protocol: HGB DOS--ORDER PLACED IN CC  EKG: [facility] approved surgical skin cleanser and instructions given per hospital policy. Patient provided with and instructed on educational handouts including Guide to Surgery, Pain Management, Hand Hygiene, Blood Transfusion Education, and [facility] Brochure. Patient answered medical/surgical history questions at their best of ability. All prior to admission medications documented in [facility]. Original medication prescription bottle WAS NOT visualized during patient appointment. Patient instructed to hold all vitamins 7 days prior to surgery and NSAIDS 5 days prior to surgery, patient verbalized understanding. Patient teach back successful and patient demonstrates knowledge of instructions.

## 2022-10-26 ENCOUNTER — HOSPITAL ENCOUNTER (OUTPATIENT)
Age: 32
Setting detail: OUTPATIENT SURGERY
Discharge: HOME | End: 2022-10-26
Attending: ORTHOPAEDIC SURGERY | Admitting: ORTHOPAEDIC SURGERY
Payer: COMMERCIAL

## 2022-10-26 ENCOUNTER — APPOINTMENT (OUTPATIENT)
Dept: GENERAL RADIOLOGY | Age: 32
End: 2022-10-26
Attending: ORTHOPAEDIC SURGERY
Payer: COMMERCIAL

## 2022-10-26 ENCOUNTER — ANESTHESIA (OUTPATIENT)
Dept: SURGERY | Age: 32
End: 2022-10-26
Payer: COMMERCIAL

## 2022-10-26 ENCOUNTER — ANESTHESIA EVENT (OUTPATIENT)
Dept: SURGERY | Age: 32
End: 2022-10-26
Payer: COMMERCIAL

## 2022-10-26 VITALS
TEMPERATURE: 97.4 F | RESPIRATION RATE: 18 BRPM | SYSTOLIC BLOOD PRESSURE: 118 MMHG | HEIGHT: 68 IN | HEART RATE: 73 BPM | WEIGHT: 220 LBS | OXYGEN SATURATION: 100 % | BODY MASS INDEX: 33.34 KG/M2 | DIASTOLIC BLOOD PRESSURE: 82 MMHG

## 2022-10-26 DIAGNOSIS — S82.842A ANKLE FRACTURE, BIMALLEOLAR, CLOSED, LEFT, INITIAL ENCOUNTER: Primary | ICD-10-CM

## 2022-10-26 LAB
HCG UR QL: NEGATIVE
HGB BLD-MCNC: 11 G/DL (ref 11.7–15.4)

## 2022-10-26 PROCEDURE — 2720000010 HC SURG SUPPLY STERILE: Performed by: ORTHOPAEDIC SURGERY

## 2022-10-26 PROCEDURE — 7100000010 HC PHASE II RECOVERY - FIRST 15 MIN: Performed by: ORTHOPAEDIC SURGERY

## 2022-10-26 PROCEDURE — 7100000001 HC PACU RECOVERY - ADDTL 15 MIN: Performed by: ORTHOPAEDIC SURGERY

## 2022-10-26 PROCEDURE — 20690 APPL UNIPLN UNI EXT FIXJ SYS: CPT | Performed by: ORTHOPAEDIC SURGERY

## 2022-10-26 PROCEDURE — 6360000002 HC RX W HCPCS: Performed by: ANESTHESIOLOGY

## 2022-10-26 PROCEDURE — 3600000002 HC SURGERY LEVEL 2 BASE: Performed by: ORTHOPAEDIC SURGERY

## 2022-10-26 PROCEDURE — 6360000002 HC RX W HCPCS: Performed by: NURSE ANESTHETIST, CERTIFIED REGISTERED

## 2022-10-26 PROCEDURE — 2709999900 HC NON-CHARGEABLE SUPPLY: Performed by: ORTHOPAEDIC SURGERY

## 2022-10-26 PROCEDURE — 73610 X-RAY EXAM OF ANKLE: CPT

## 2022-10-26 PROCEDURE — 3700000000 HC ANESTHESIA ATTENDED CARE: Performed by: ORTHOPAEDIC SURGERY

## 2022-10-26 PROCEDURE — 64445 NJX AA&/STRD SCIATIC NRV IMG: CPT | Performed by: ANESTHESIOLOGY

## 2022-10-26 PROCEDURE — 6360000002 HC RX W HCPCS: Performed by: EMERGENCY MEDICINE

## 2022-10-26 PROCEDURE — 85018 HEMOGLOBIN: CPT

## 2022-10-26 PROCEDURE — 3700000001 HC ADD 15 MINUTES (ANESTHESIA): Performed by: ORTHOPAEDIC SURGERY

## 2022-10-26 PROCEDURE — C1713 ANCHOR/SCREW BN/BN,TIS/BN: HCPCS | Performed by: ORTHOPAEDIC SURGERY

## 2022-10-26 PROCEDURE — 3600000012 HC SURGERY LEVEL 2 ADDTL 15MIN: Performed by: ORTHOPAEDIC SURGERY

## 2022-10-26 PROCEDURE — 81025 URINE PREGNANCY TEST: CPT

## 2022-10-26 PROCEDURE — 6360000002 HC RX W HCPCS: Performed by: ORTHOPAEDIC SURGERY

## 2022-10-26 PROCEDURE — 7100000000 HC PACU RECOVERY - FIRST 15 MIN: Performed by: ORTHOPAEDIC SURGERY

## 2022-10-26 PROCEDURE — 7100000011 HC PHASE II RECOVERY - ADDTL 15 MIN: Performed by: ORTHOPAEDIC SURGERY

## 2022-10-26 PROCEDURE — 2580000003 HC RX 258: Performed by: NURSE ANESTHETIST, CERTIFIED REGISTERED

## 2022-10-26 PROCEDURE — 64447 NJX AA&/STRD FEMORAL NRV IMG: CPT | Performed by: ANESTHESIOLOGY

## 2022-10-26 DEVICE — PIN FIX L300MM DIA5MM S STL W/ CTRL THRD FOR L EXT FIX: Type: IMPLANTABLE DEVICE | Site: ANKLE | Status: FUNCTIONAL

## 2022-10-26 RX ORDER — MIDAZOLAM HYDROCHLORIDE 1 MG/ML
INJECTION INTRAMUSCULAR; INTRAVENOUS PRN
Status: DISCONTINUED | OUTPATIENT
Start: 2022-10-26 | End: 2022-10-26 | Stop reason: SDUPTHER

## 2022-10-26 RX ORDER — DIPHENHYDRAMINE HYDROCHLORIDE 50 MG/ML
12.5 INJECTION INTRAMUSCULAR; INTRAVENOUS
Status: DISCONTINUED | OUTPATIENT
Start: 2022-10-26 | End: 2022-10-26 | Stop reason: HOSPADM

## 2022-10-26 RX ORDER — MIDAZOLAM HYDROCHLORIDE 2 MG/2ML
2 INJECTION, SOLUTION INTRAMUSCULAR; INTRAVENOUS ONCE
Status: DISCONTINUED | OUTPATIENT
Start: 2022-10-26 | End: 2022-10-26 | Stop reason: HOSPADM

## 2022-10-26 RX ORDER — HYDROMORPHONE HYDROCHLORIDE 2 MG/ML
0.5 INJECTION, SOLUTION INTRAMUSCULAR; INTRAVENOUS; SUBCUTANEOUS EVERY 5 MIN PRN
Status: DISCONTINUED | OUTPATIENT
Start: 2022-10-26 | End: 2022-10-26 | Stop reason: HOSPADM

## 2022-10-26 RX ORDER — ROPIVACAINE HYDROCHLORIDE 5 MG/ML
INJECTION, SOLUTION EPIDURAL; INFILTRATION; PERINEURAL
Status: COMPLETED | OUTPATIENT
Start: 2022-10-26 | End: 2022-10-26

## 2022-10-26 RX ORDER — FENTANYL CITRATE 50 UG/ML
100 INJECTION, SOLUTION INTRAMUSCULAR; INTRAVENOUS ONCE
Status: DISCONTINUED | OUTPATIENT
Start: 2022-10-26 | End: 2022-10-26 | Stop reason: HOSPADM

## 2022-10-26 RX ORDER — ONDANSETRON 2 MG/ML
4 INJECTION INTRAMUSCULAR; INTRAVENOUS
Status: DISCONTINUED | OUTPATIENT
Start: 2022-10-26 | End: 2022-10-26 | Stop reason: HOSPADM

## 2022-10-26 RX ORDER — OXYCODONE HYDROCHLORIDE AND ACETAMINOPHEN 5; 325 MG/1; MG/1
2 TABLET ORAL EVERY 4 HOURS PRN
Qty: 60 TABLET | Refills: 0 | Status: SHIPPED | OUTPATIENT
Start: 2022-10-26 | End: 2022-10-31

## 2022-10-26 RX ORDER — OXYCODONE HYDROCHLORIDE 5 MG/1
10 TABLET ORAL PRN
Status: DISCONTINUED | OUTPATIENT
Start: 2022-10-26 | End: 2022-10-26 | Stop reason: HOSPADM

## 2022-10-26 RX ORDER — SODIUM CHLORIDE, SODIUM LACTATE, POTASSIUM CHLORIDE, CALCIUM CHLORIDE 600; 310; 30; 20 MG/100ML; MG/100ML; MG/100ML; MG/100ML
INJECTION, SOLUTION INTRAVENOUS CONTINUOUS PRN
Status: DISCONTINUED | OUTPATIENT
Start: 2022-10-26 | End: 2022-10-26 | Stop reason: SDUPTHER

## 2022-10-26 RX ORDER — OXYCODONE HYDROCHLORIDE 5 MG/1
5 TABLET ORAL PRN
Status: DISCONTINUED | OUTPATIENT
Start: 2022-10-26 | End: 2022-10-26 | Stop reason: HOSPADM

## 2022-10-26 RX ORDER — PROPOFOL 10 MG/ML
INJECTION, EMULSION INTRAVENOUS PRN
Status: DISCONTINUED | OUTPATIENT
Start: 2022-10-26 | End: 2022-10-26 | Stop reason: SDUPTHER

## 2022-10-26 RX ADMIN — ROPIVACAINE HYDROCHLORIDE 30 ML: 5 INJECTION, SOLUTION EPIDURAL; INFILTRATION; PERINEURAL at 08:33

## 2022-10-26 RX ADMIN — Medication 2000 MG: at 09:42

## 2022-10-26 RX ADMIN — MIDAZOLAM 2 MG: 1 INJECTION INTRAMUSCULAR; INTRAVENOUS at 09:42

## 2022-10-26 RX ADMIN — PROPOFOL 120 MCG/KG/MIN: 10 INJECTION, EMULSION INTRAVENOUS at 09:51

## 2022-10-26 RX ADMIN — PROPOFOL 50 MG: 10 INJECTION, EMULSION INTRAVENOUS at 09:50

## 2022-10-26 RX ADMIN — MIDAZOLAM 2 MG: 1 INJECTION INTRAMUSCULAR; INTRAVENOUS at 08:34

## 2022-10-26 RX ADMIN — SODIUM CHLORIDE, SODIUM LACTATE, POTASSIUM CHLORIDE, AND CALCIUM CHLORIDE: 600; 310; 30; 20 INJECTION, SOLUTION INTRAVENOUS at 09:30

## 2022-10-26 RX ADMIN — ROPIVACAINE HYDROCHLORIDE 20 ML: 5 INJECTION, SOLUTION EPIDURAL; INFILTRATION; PERINEURAL at 08:38

## 2022-10-26 RX ADMIN — FENTANYL CITRATE 100 MCG: 50 INJECTION, SOLUTION INTRAMUSCULAR; INTRAVENOUS at 08:34

## 2022-10-26 ASSESSMENT — PAIN - FUNCTIONAL ASSESSMENT: PAIN_FUNCTIONAL_ASSESSMENT: 0-10

## 2022-10-26 NOTE — ANESTHESIA PROCEDURE NOTES
Peripheral Block    Patient location during procedure: pre-op  Reason for block: post-op pain management and at surgeon's request  Start time: 10/26/2022 8:38 AM  End time: 10/26/2022 8:41 AM  Staffing  Performed: anesthesiologist   Anesthesiologist: Dayana Loredo MD  Preanesthetic Checklist  Completed: patient identified, IV checked, site marked, risks and benefits discussed, surgical/procedural consents, equipment checked, pre-op evaluation, timeout performed, anesthesia consent given, oxygen available and monitors applied/VS acknowledged  Peripheral Block   Patient position: supine  Prep: ChloraPrep  Provider prep: mask and sterile gloves  Patient monitoring: cardiac monitor, continuous pulse ox, oxygen, IV access, frequent blood pressure checks and responsive to questions  Block type: Femoral  Adductor canal  Laterality: left  Injection technique: single-shot  Guidance: nerve stimulator and ultrasound guided    Needle   Needle type: insulated echogenic nerve stimulator needle   Needle gauge: 20 G  Needle localization: nerve stimulator and ultrasound guidance (minimal motor response at >0.4 mA)  Needle length: 10 cm  Assessment   Injection assessment: negative aspiration for heme, no paresthesia on injection, local visualized surrounding nerve on ultrasound and no intravascular symptoms  Slow fractionated injection: yes  Hemodynamics: stable  Real-time US image taken/store: yes  Outcomes: patient tolerated procedure well    Additional Notes  Risks/benefits/alternatives discussed including damage to nerve or muscle. Needle inserted and placed in close proximity to the nerve under real time ultrasound guidance. Ultrasound was used to visualize the spread of local anesthetic in increments of 2cc to 5cc in close proximity to the nerve being blocked. Patient tolerated procedure well with no immediate complications.   Image saved to chart    Decadron 4mg added to local anesthetic solution as adjunct  Medications Administered  ropivacaine (NAROPIN) injection 0.5% - Perineural   20 mL - 10/26/2022 8:38:00 AM

## 2022-10-26 NOTE — ANESTHESIA POSTPROCEDURE EVALUATION
Department of Anesthesiology  Postprocedure Note    Patient: Eda Canas  MRN: [id number]  YOB: 1990  Date of evaluation: 10/26/2022      Procedure Summary     Date: 10/26/22 Room / Location: [facility] MAIN OR 08 / [facility] MAIN OR    Anesthesia Start: 0443 Anesthesia Stop: 1031    Procedure: LEFT ANKLE EXTERNAL FIXATOR (Left: Ankle) Diagnosis:       Ankle fracture, bimalleolar, closed, left, initial encounter      (Ankle fracture, bimalleolar, closed, left, initial encounter [Z72.809T])    Providers: Niurka Cheng MD Responsible Provider:     Anesthesia Type: general, TIVA ASA Status: 2          Anesthesia Type: No value filed.     Gonzalo Phase I: Gonzalo Score: 8    Gonzalo Phase II:        Anesthesia Post Evaluation    Patient location during evaluation: PACU  Patient participation: complete - patient participated  Level of consciousness: awake and alert  Airway patency: patent  Nausea & Vomiting: no nausea and no vomiting  Complications: no  Cardiovascular status: hemodynamically stable  Respiratory status: acceptable, nonlabored ventilation and spontaneous ventilation  Hydration status: euvolemic  Comments: /69   Pulse 64   Temp 97.4 °F (36.3 °C) (Temporal)   Resp 16   Ht 5' 8\" (1.727 m)   Wt 220 lb (99.8 kg)   LMP 10/18/2022 (Exact Date)   SpO2 98%   BMI 33.45 kg/m²     Multimodal analgesia pain management approach

## 2022-10-26 NOTE — DISCHARGE INSTRUCTIONS
Nonweightbearing on operative extremity and Resume regular diet    May remove bulky dressing in 48 hours and at that point may redress with Telfa for the fracture blisters and begin pin care with half-strength hydrogen peroxide for pin sites  Use hydrogen peroxide and dilute it with the normal saline then use your q-tips to clean around the pin sites at least twice a day. Place Xeroform on the blisters. You may place the telfa gauze on top of the xeroform and wrap the leg with Kerlix. You may shower in 72 hours. Keep site dry with a cast bag that can be found in [facility].          Pin Care: What to Expect at Home  Your Recovery  To hold your bone in place as it heals, your doctor inserted one or more pins into the bone. Some pins are like thick wires. Others are more like screws. In some cases, the pins are attached to an external fixator. This device helps hold your bone in place from outside your body. Pins may stay in place until the bone is healed. Your doctor will tell you how long the pins will be needed. The places where the pins go into the skin are called the pin sites. You must keep these areas clean to prevent infection. An infection could make a pin become loose or even require your doctor to take out a pin. This care sheet gives you a general idea about how long it will take for you to recover. But each person recovers at a different pace. Follow the steps below to get better as quickly as possible. How can you care for yourself at home? Pin care  Your doctor will give you specific information on when and how to clean the pin sites. The following is general information. Wash your hands with soap and water. Get your cleaning supplies ready. Your doctor will tell you what to use. These supplies usually include:  A cleaning solution. Cups to hold the solution. Cotton swabs. Cotton gauze. Wash your hands again. Use your fingers to gently massage the area around the pin.  This can move skin attached to the pin away from the pin and help any fluid rise to the skin, where you can clean it. Clean each pin site with cotton swabs. Use a new swab for each pin site. Dip the swab in the cleaning solution. Clean the pin site. [place] around the site, moving away from the pin. If there is any crust around the pin, remove it with the swab. Use as many swabs as you need until the site is clean. Dry the area with a new swab. Clean the pin with a swab or gauze dipped in the cleaning solution. Pay close attention to any threaded area on the pin. Use a new swab or piece of gauze for each pin. For the first few days, wrap gauze loosely around each pin site. If you have a fixator, use gauze or cotton swabs dipped in the cleaning solution to clean the fixator and any wires that connect it to the pins. Other instructions    Your doctor will tell you when you can take a shower. In general, the pin sites need to be kept dry for a while after they have been put in place. Ask your doctor if and when you can swim while the pins are in. Prop up the pin area on a pillow, if the pin is on your arm or leg. Try to keep it above the level of your heart. This will help reduce swelling. Be careful when moving around. You don't want to bump or snag the pin or fixator on anything. Your doctor may give you specific instructions on what you can and can't do. Avoid clothing that pulls or rubs on the pin or fixator. If possible, don't wear clothing over it. Be careful when getting dressed so that your clothing doesn't snag on the pin or fixator. Your doctor will tell you how and when you can exercise and when you can drive. Follow-up care is a key part of your treatment and safety. Be sure to make and go to all appointments, and call your doctor if you are having problems. It's also a good idea to know your test results and keep a list of the medicines you take. When should you call for help? Call 911  anytime you think you may need emergency care. For example, call if:    You passed out (lost consciousness). You have chest pain, are short of breath, or cough up blood. Call your doctor now or seek immediate medical care if:    You have symptoms of infection, such as: Increased pain, swelling, warmth, or redness. Red streaks leading from any incision or pin site. Pus draining from any incision or pin site. A fever. You have tingling, weakness, or numbness around the pin area. Your splint, if you have one, feels too tight. You have signs of a blood clot in the leg (called a deep vein thrombosis), such as:  Pain in your calf, back of the knee, thigh, or groin. Redness and swelling in your leg. Watch closely for changes in your health, and be sure to contact your doctor if:    You have new or increased pain. You notice that the pin or any part of the fixator seems loose or out of place. There is bleeding around a pin site that won't stop. You also have a splint and there's drainage or a bad smell coming from it. Learning About How to Use Crutches  Overview  Crutches can help you walk when you have an injured hip, leg, knee, ankle, or foot. Your doctor will tell you how much weight--if any--you can put on your leg. Be sure your crutches fit you. When you stand up in your normal posture, there should be space for two or three fingers between the top of the crutch and your armpit. When you let your hands hang down, the hand  should be at your wrists. When you put your hands on the hand , your elbows should be slightly bent. To stay safe when using crutches:  Look straight ahead, not down at your feet. Clear away small rugs, cords, or anything else that could cause you to trip, slip, or fall. Be very careful around pets and small children. They can get in your path when you least expect it.   Be sure the rubber tips on your crutches are clean and in good condition to help prevent slipping. Avoid slick conditions, such as wet floors and snowy or icy driveways. In bad weather, be extra careful on curbs and steps. How to walk with crutches without weight on the injured leg     Keep the injured leg bent and off the ground. Set the crutches at arm's length in front of you. Don't lean forward to reach farther. Move your weak or injured leg forward, almost even with the crutches. Bend your elbows slightly. Press the padded top parts of the crutches against your sides, under your armpits. Push straight down on the handgrips as you bring your good leg up, so it is even with the weak or injured leg. Keep all the weight on your hands and not on your underarms. Repeat. When you are confident using the crutches, you can move the crutches and your injured leg at the same time. Then push straight down on the crutches as you step past the crutches with your strong leg, as you would in normal walking. How to walk with crutches with weight on the injured leg     Put both crutches about 12 inches in front of you. The crutches and your feet should form a triangle. Hold the crutches close enough to your body so you can push straight down on them, but leave room between the crutches for your body to pass through. Don't lean forward to reach farther. Put your weight on the handgrips, not on the pads under your arms. Constant pressure against your underarms can cause numbness. Move your weak or injured leg forward so it's almost even with the crutches. Bring your good leg up, so it's even with your weak or injured leg. Move your crutches about 12 inches in front of you, and start the next step. When you're confident using the crutches, you can move the crutches and your injured leg at the same time. Then push straight down on the crutches as you step past the crutches with your strong leg, as you would in normal walking.   How to sit down and stand up from a chair with crutches  To sit, back up to the chair. Use one hand to hold both crutches by the handgrips, beside your injured leg. With the other hand, hold onto the seat and slowly lower yourself onto the chair. Lay the crutches on the ground near your chair. If you prop them up, they may fall over. To get up from a chair,  the crutches and put them in one hand beside your injured leg. Put your weight on the handgrips of the crutches and on your strong leg to stand up. How to go up and down stairs using crutches  Try this first with another person nearby to steady you if needed. And remember \"up with the good, down with the bad\" to help you lead with the correct leg. If the stairs don't have a handrail, stand near the edge of the stairs. To go up, step up with your stronger leg. Then bring the crutches and your weak or injured leg to the upper step. To go down, put your crutches and your weak or injured leg on the lower step. Then bring your stronger leg down to the lower step. If the stairs have a sturdy handrail, stand near the edge of the stairs. Put both crutches under the arm opposite the handrail. Use the hand opposite the handrail to hold both crutches by the handgrips. Hold on to the handrail as you go up or down. To go up, step up with your stronger leg. Then bring the crutches and your weak or injured leg to the upper step. To go down,put your crutches and your weak or injured leg on the lower step. Then bring your stronger leg down to the lower step. MEDICATION INTERACTION:  During your procedure you potentially received a medication or medications which may reduce the effectiveness of oral contraceptives. Please consider other forms of contraception for 1 month following your procedure if you are currently using oral contraceptives as your primary form of birth control.  In addition to this, we recommend continuing your oral contraceptive as prescribed, unless otherwise instructed by your physician, during this time    After general anesthesia or intravenous sedation, for 24 hours or while taking prescription Narcotics:  Limit your activities  A responsible adult needs to be with you for the next 24 hours  Do not drive and operate hazardous machinery  Do not make important personal or business decisions  Do not drink alcoholic beverages  If you have not urinated within 8 hours after discharge, and you are experiencing discomfort from urinary retention, please go to the nearest ED. If you have sleep apnea and have a CPAP machine, please use it for all naps and sleeping. Please use caution when taking narcotics and any of your home medications that may cause drowsiness. *  Please give a list of your current medications to your Primary Care Provider. *  Please update this list whenever your medications are discontinued, doses are      changed, or new medications (including over-the-counter products) are added. *  Please carry medication information at all times in case of emergency situations. These are general instructions for a healthy lifestyle:  No smoking/ No tobacco products/ Avoid exposure to second hand smoke  Surgeon General's Warning:  Quitting smoking now greatly reduces serious risk to your health. Obesity, smoking, and sedentary lifestyle greatly increases your risk for illness  A healthy diet, regular physical exercise & weight monitoring are important for maintaining a healthy lifestyle    You may be retaining fluid if you have a history of heart failure or if you experience any of the following symptoms:  Weight gain of 3 pounds or more overnight or 5 pounds in a week, increased swelling in our hands or feet or shortness of breath while lying flat in bed. Please call your doctor as soon as you notice any of these symptoms; do not wait until your next office visit.

## 2022-10-26 NOTE — PERIOP NOTE
Discharge instructions reviewed with pt and family member Julia Blanco) who verbalize understanding of follow up care.

## 2022-10-26 NOTE — ANESTHESIA PRE PROCEDURE
Department of Anesthesiology  Preprocedure Note       Name:  Katherin Ovalles   Age:  33 y.o.  :  1990                                          MRN:  [id number]         Date:  10/26/2022      Surgeon: Omero Whitfield):  Fritz Dawn MD    Procedure: Procedure(s):  LEFT ANKLE OPEN REDUCTION INTERNAL FIXATION    Medications prior to admission:   Prior to Admission medications    Medication Sig Start Date End Date Taking? Authorizing Provider   oxyCODONE-acetaminophen (PERCOCET) 5-325 MG per tablet Take 2 tablets by mouth every 4 hours as needed for Pain for up to 5 days. Intended supply: 5 days. Take lowest dose possible to manage pain 10/25/22 10/30/22  Fritz Dawn MD   HYDROcodone-acetaminophen [facility]) 5-325 MG per tablet Take 1 tablet by mouth every 4 hours as needed for Pain for up to 3 days. Intended supply: 3 days.  Take lowest dose possible to manage pain 10/24/22 10/27/22  Mikaela Alfaro DO   nadolol (CORGARD) 20 MG tablet TAKE 1 TABLET BY MOUTH AT BEDTIME FOR MIGRAINE PREVENTION 22   Historical Provider, MD   cloNIDine (CATAPRES) 0.1 MG/24HR PTWK Place 1 patch onto the skin once a week  Patient taking differently: Place 1 patch onto the skin once a week On 22   Avery Crook PA-C   butalbital-acetaminophen-caffeine (FIORICET, ESGIC) -75 MG per tablet Take 1 tablet by mouth every 4 hours as needed for Headaches 22   Avery Crook PA-C   buPROPion [facility] SR) 150 MG extended release tablet Take 300 mg by mouth at bedtime    Ar Automatic Reconciliation   ibuprofen (ADVIL;MOTRIN) 800 MG tablet Take 800 mg by mouth every 8 hours as needed 21   Ar Automatic Reconciliation   SUMAtriptan (IMITREX) 100 MG tablet 1/2 - 1 po @@ onset of migraine, may repeat in 2h prn 21   Ar Automatic Reconciliation       Current medications:    Current Facility-Administered Medications   Medication Dose Route Frequency Provider Last Rate Last Admin    ceFAZolin (ANCEF) 2000 mg in sterile water 20 mL IV syringe  2,000 mg IntraVENous On Call to 1100 Raza Rizo MD        fentaNYL (SUBLIMAZE) injection 100 mcg  100 mcg IntraVENous Once Kajal Lewis MD        midazolam PF (VERSED) injection 2 mg  2 mg IntraVENous Once Kajal Lewis MD           Allergies: Allergies   Allergen Reactions    Cefaclor Other (See Comments)     Pt unsure of rx-- was as a child    Sulfa Antibiotics Other (See Comments)     Rx unknown -- was as a child       Problem List:    Patient Active Problem List   Diagnosis Code    History of cholestasis during pregnancy Z87.59, Z87.19    High-risk pregnancy in second trimester O09.92    Obesity affecting pregnancy in second trimester O99.212    Preeclampsia O14.90    History of  delivery, currently pregnant in second trimester O09.892    Pre-eclampsia O14.90    BMI 36.0-36.9,adult Z68.36     delivery delivered O82    Previous  section complicating pregnancy O25.305    Depression affecting pregnancy in second trimester, antepartum O99.342, F31. A    Chronic benign essential hypertension in second trimester O10.012    History of twin pregnancy in prior pregnancy Z87.59    Anemia in pregnancy O99.019    Ankle fracture, bimalleolar, closed, left, initial encounter S82.189J       Past Medical History:        Diagnosis Date    ADHD     Anemia     Depression 2016    Hypertension     controlled with med    Left ankle pain     Migraine without aura and without status migrainosus, not intractable     Polycystic disease, ovaries     PONV (postoperative nausea and vomiting)     with c- sect     Preeclampsia 2021     delivery        Past Surgical History:        Procedure Laterality Date     SECTION      c section x 2--  and        Social History:    Social History     Tobacco Use    Smoking status: Never    Smokeless tobacco: Never   Substance Use Topics    Alcohol use:  No Counseling given: Not Answered      Vital Signs (Current):   Vitals:    10/26/22 0825 10/26/22 0830 10/26/22 0835 10/26/22 0840   BP: (!) 144/79 (!) 149/76 (!) 153/87 135/67   Pulse: 65 74 72 73   Resp: 16      Temp:       TempSrc:       SpO2: 100% 100% 100% 100%   Weight:       Height:                                                  BP Readings from Last 3 Encounters:   10/26/22 135/67   10/24/22 (!) 154/108   06/13/22 130/80       NPO Status: Time of last liquid consumption: 0600 (water)                        Time of last solid consumption: 2100                        Date of last liquid consumption: 10/26/22                        Date of last solid food consumption: 10/25/22    BMI:   Wt Readings from Last 3 Encounters:   10/26/22 220 lb (99.8 kg)   10/24/22 240 lb (108.9 kg)   06/13/22 250 lb (113.4 kg)     Body mass index is 33.45 kg/m². CBC:   Lab Results   Component Value Date/Time    WBC 10.4 06/17/2021 06:07 AM    RBC 2.96 06/17/2021 06:07 AM    HGB 7.3 06/18/2021 05:37 AM    HCT 24.9 06/18/2021 05:37 AM    MCV 80.7 06/17/2021 06:07 AM    RDW 21.2 06/17/2021 06:07 AM     06/17/2021 06:07 AM       CMP:   Lab Results   Component Value Date/Time     06/11/2021 06:59 AM    K 4.4 06/11/2021 06:59 AM     06/11/2021 06:59 AM    CO2 23 06/11/2021 06:59 AM    BUN 10 06/11/2021 06:59 AM    CREATININE 0.81 06/11/2021 06:59 AM    GFRAA >60 06/11/2021 06:59 AM    AGRATIO 0.6 06/11/2021 06:59 AM    GLUCOSE 80 06/11/2021 06:59 AM    PROT 6.6 06/11/2021 06:59 AM    CALCIUM 8.8 06/11/2021 06:59 AM    BILITOT 0.2 06/11/2021 06:59 AM    ALKPHOS 122 06/11/2021 06:59 AM    AST 34 06/11/2021 06:59 AM    ALT 22 06/11/2021 06:59 AM       POC Tests: No results for input(s): POCGLU, POCNA, POCK, POCCL, POCBUN, POCHEMO, POCHCT in the last 72 hours.     Coags: No results found for: PROTIME, INR, APTT    HCG (If Applicable):   Lab Results   Component Value Date    PREGTESTUR Negative 10/26/2022        ABGs:   Lab Results   Component Value Date/Time    SES8ADQ 29.6 06/16/2021 11:04 AM    BEART 0.1 06/16/2021 11:04 AM    BEART 2.5 06/16/2021 11:04 AM        Type & Screen (If Applicable):  No results found for: LABABO, LABRH    Drug/Infectious Status (If Applicable):  No results found for: HIV, HEPCAB    COVID-19 Screening (If Applicable): No results found for: COVID19        Anesthesia Evaluation  Patient summary reviewed and Nursing notes reviewed  Airway: Mallampati: II  TM distance: >3 FB   Neck ROM: full  Mouth opening: > = 3 FB   Dental: normal exam         Pulmonary:Negative Pulmonary ROS and normal exam  breath sounds clear to auscultation                             Cardiovascular:  Exercise tolerance: good (>4 METS),   (+) hypertension:,         Rhythm: regular  Rate: normal                    Neuro/Psych:   (+) headaches:, psychiatric history:            GI/Hepatic/Renal:   (+) morbid obesity          Endo/Other: Negative Endo/Other ROS                    Abdominal:             Vascular: negative vascular ROS. Other Findings:           Anesthesia Plan      general and TIVA     ASA 2       Induction: intravenous. Anesthetic plan and risks discussed with patient.               Post-op pain plan if not by surgeon: single peripheral nerve block            Jonathan Bañuelos MD   10/26/2022

## 2022-10-26 NOTE — INTERVAL H&P NOTE
Update History & Physical    The Patient's History and Physical of 10/25/2022 was reviewed with the patient and I examined the patient. There was no change. The surgical site was confirmed by the patient and me. Plan:  The risk, benefits, expected outcome, and alternative to the recommended procedure have been discussed with the patient. Patient understands and wants to proceed with open reduction internal fixation of left bimalleolar ankle fracture.     Electronically signed by Fritz Dawn MD on 10/26/2022 at 8:17 AM

## 2022-10-26 NOTE — OP NOTE
Operative Report    Patient: Kia Regan MRN: [id number]  SSN: [id number]    YOB: 1990  Age: 33 y.o. Sex: female       Date of Surgery: October 26, 2022     History:  Kia Regan is a 33 y.o. female who fell and suffered a twisting injury to her left ankle. She was seen in the emergency room and found to have a left bimalleolar ankle fracture. She was splinted I saw her in the office. I did have a chance to examine her and I looked at her soft tissues by partially removing her splint and it appeared that she had a reasonable amount of swelling. I talked to her about the fact that I thought it would be reasonable to go ahead and proceed with operative intervention. She did ask about whether or not it was okay to go ahead and proceed with surgery I did explain that if she had such a significant amount of swelling that we would be concerned then we would likely place her in an external fixator. So I did talk to her about this and the possible need for this although I did definitely give her the idea that I did not think this was likely. She seemed understand and wished to proceed. I talked to the patient and/or their representative and explained the exact nature the procedure. I also went through a detailed list of the material risks associated with  the procedure which included risk of bleeding, infection, injury to nearby structures, worsening the situation, as well as the risks associate with anesthesia and finally death. Also talked with him regarding the benefits and alternatives to the procedure.     Preoperative Diagnosis: Ankle fracture, bimalleolar, closed, left, initial encounter [X82.300S]     Postoperative Diagnosis:   Ankle fracture, bimalleolar, closed, left, initial encounter      Surgeon(s) and Role:     * Cherelle De Jesus MD - Primary    Anesthesia: Choice     Procedure: #1-closed treatment with manipulation left bimalleolar ankle fracture #2-application of uniplanar external fixator left ankle    Procedure in Detail: After successful induction of general anesthetic as well as a regional block for postoperative analgesia her left lower extremity was examined with the splint completely off unfortunately she did have very significant fracture blisters. These were almost circumferential.  She had large fracture blisters over the lateral and the medial aspect of the ankle so this would be exactly where her incisions would be. The lateral 1 appear to be mostly a quite fracture blister medially she had some red and white combination of the fracture blisters. I then proceeded after prepping and draping to rupture the fracture blisters and then I felt the only reasonable thing would be to place an external fixator. I was not comfortable making incisions through these and I would have to go through the center of the fracture blisters for both the lateral as well as the medial fragment so at that point I placed 2 half pins in her tibia 2 transfixion pins were calcaneus and then fashioned a [employer] external fixator across her left ankle. I made sure that there was a reasonable alignment of her bimalleolar ankle fracture and the external fixator radiographically and that my pin sites proximally were of appropriate length. I then dressed all of her wounds and fracture blisters with Xeroform and her pin sites with Acticoat and she was then awakened taken cover him in stable condition      Estimated Blood Loss: 30 cc    Tourniquet Time: * No tourniquets in log *      Implants:   Implant Name Type Inv.  Item Serial No.  Lot No. LRB No. Used Action   PIN FIX L300MM DIA5MM S STL W/ CTRL THRD FOR L EXT FIX - [id number]  PIN FIX L300MM DIA5MM S STL W/ CTRL THRD FOR L EXT FIX  [employer]- [id number] Left 2 Implanted               Specimens: * No specimens in log *        Drains: None                Complications: None    Counts: Sponge and needle counts were correct times two.     Signed By:  Niurka Cheng MD     October 26, 2022

## 2022-10-26 NOTE — ANESTHESIA PROCEDURE NOTES
Peripheral Block    Patient location during procedure: pre-op  Reason for block: post-op pain management and at surgeon's request  Start time: 10/26/2022 8:33 AM  End time: 10/26/2022 8:37 AM  Staffing  Performed: anesthesiologist   Anesthesiologist: Annette Halsted, MD  Preanesthetic Checklist  Completed: patient identified, IV checked, site marked, risks and benefits discussed, surgical/procedural consents, equipment checked, pre-op evaluation, timeout performed, anesthesia consent given, oxygen available and monitors applied/VS acknowledged  Peripheral Block   Patient position: supine  Prep: ChloraPrep  Provider prep: mask and sterile gloves  Patient monitoring: cardiac monitor, continuous pulse ox, oxygen, IV access, frequent blood pressure checks and responsive to questions  Block type: Sciatic  Popliteal  Laterality: left  Injection technique: single-shot  Guidance: nerve stimulator and ultrasound guided    Needle   Needle type: insulated echogenic nerve stimulator needle   Needle gauge: 20 G  Needle localization: nerve stimulator and ultrasound guidance (minimal motor response at >0.4 mA)  Needle length: 10 cm  Assessment   Injection assessment: negative aspiration for heme, no paresthesia on injection, local visualized surrounding nerve on ultrasound and no intravascular symptoms  Slow fractionated injection: yes  Hemodynamics: stable  Real-time US image taken/store: yes  Outcomes: patient tolerated procedure well    Additional Notes  Risks/benefits/alternatives discussed including damage to nerve or muscle. Needle inserted and placed in close proximity to the nerve under real time ultrasound guidance. Ultrasound was used to visualize the spread of local anesthetic in increments of 2cc to 5cc in close proximity to the nerve being blocked. Patient tolerated procedure well with no immediate complications.   Image saved to chart    Decadron 4mg added to local anesthetic solution as adjunct  Medications Administered  ropivacaine (NAROPIN) injection 0.5% - Perineural   30 mL - 10/26/2022 8:33:00 AM

## 2022-10-28 ENCOUNTER — TELEPHONE (OUTPATIENT)
Dept: ORTHOPEDIC SURGERY | Age: 32
End: 2022-10-28

## 2022-10-28 NOTE — TELEPHONE ENCOUNTER
She is needing to speak to Eliza Speaks again about the instructions she gave her. Please call her again.

## 2022-10-28 NOTE — TELEPHONE ENCOUNTER
She is having bleeding at one of the pin sites and she has some questions concerning the surgery she had. Please give her a call. She is asking for a return call today, before the weekend.

## 2022-11-08 ENCOUNTER — PREP FOR PROCEDURE (OUTPATIENT)
Dept: ORTHOPEDIC SURGERY | Age: 32
End: 2022-11-08

## 2022-11-08 ENCOUNTER — OFFICE VISIT (OUTPATIENT)
Dept: ORTHOPEDIC SURGERY | Age: 32
End: 2022-11-08

## 2022-11-08 ENCOUNTER — ANESTHESIA EVENT (OUTPATIENT)
Dept: SURGERY | Age: 32
End: 2022-11-08
Payer: COMMERCIAL

## 2022-11-08 DIAGNOSIS — S82.842A BIMALLEOLAR ANKLE FRACTURE, LEFT, CLOSED, INITIAL ENCOUNTER: Primary | ICD-10-CM

## 2022-11-08 DIAGNOSIS — Z09 HOSPITAL DISCHARGE FOLLOW-UP: ICD-10-CM

## 2022-11-08 PROCEDURE — 99024 POSTOP FOLLOW-UP VISIT: CPT | Performed by: ORTHOPAEDIC SURGERY

## 2022-11-08 RX ORDER — AMOXICILLIN AND CLAVULANATE POTASSIUM 875; 125 MG/1; MG/1
1 TABLET, FILM COATED ORAL 2 TIMES DAILY
Qty: 6 TABLET | Refills: 0 | Status: SHIPPED | OUTPATIENT
Start: 2022-11-08 | End: 2022-11-11

## 2022-11-08 NOTE — PROGRESS NOTES
Patient verified name and     Order for consent WAS  found in EHR and matches case posting; patient verified. Type 1B surgery, PHONE assessment complete. Labs per surgeon: NONE  Labs per anesthesia protocol: HCG DOS  EKG: NONE    MRSA/MSSA swab collected; pharmacy to review and dose antibiotic as appropriate. Hospital approved surgical skin cleanser and instructions given per hospital policy. Patient provided with and instructed on educational handouts including Guide to Surgery, Pain Management, Hand Hygiene, Blood Transfusion Education, and [facility] Brochure. Patient answered medical/surgical history questions at their best of ability. All prior to admission medications documented in [facility]. Original medication prescription bottle WAS NOT visualized during patient appointment. Patient instructed to hold all vitamins 7 days prior to surgery and NSAIDS 5 days prior to surgery, patient verbalized understanding. Patient teach back successful and patient demonstrates knowledge of instructions.

## 2022-11-08 NOTE — PROGRESS NOTES
PLEASE CONTINUE TAKING ALL PRESCRIPTION MEDICATIONS UP TO THE DAY OF SURGERY UNLESS OTHERWISE DIRECTED BELOW. DISCONTINUE all vitamins and supplements 7 days prior to surgery. DISCONTINUE Non-Steriodal Anti-Inflammatory (NSAIDS) such as Advil and Aleve 5 days prior to surgery. Home Medications to take  the day of surgery    Augmentin and if needed imitrex           Home Medications   to Hold   ibuprofen        Comments                 Please do not bring home medications with you on the day of surgery unless otherwise directed by your nurse. If you are instructed to bring home medications, please give them to your nurse as they will be administered by the nursing staff. If you have any questions, please call [facility] [phone] or [facility] [phone]. A copy of this note was provided to the patient for reference.

## 2022-11-08 NOTE — PROGRESS NOTES
a         History and physical    Patient: Jose Larson MRN: [id number]  SSN: [id number]    YOB: 1990  Age: 33 y.o. Sex: female      Subjective:      Jose Larson is a 33 y.o. female who had a left bimalleolar/trimalleolar ankle fracture. She came into the operating room approximately week and a half ago at which time she was had so much swelling and fracture blisters we had to put her in an Ex-Fix and she now returns for planned staged procedure for removal of external fixator and open reduction internal fixation of her left bimalleolar ankle fracture. .    Past Medical History:   Diagnosis Date    ADHD     Anemia     Depression 2016    Hypertension     controlled with med    Left ankle pain     Migraine without aura and without status migrainosus, not intractable     Polycystic disease, ovaries     PONV (postoperative nausea and vomiting)     with c- sect     Preeclampsia 2021     delivery      Past Surgical History:   Procedure Laterality Date    ANKLE FRACTURE SURGERY Left 10/26/2022    LEFT ANKLE EXTERNAL FIXATOR performed by Bassam Centeno MD at [facility] OR    [address] [place] [address]      c section x 2--  and       [facility] -No history of inflammatory arthritis   Social History     Tobacco Use    Smoking status: Never    Smokeless tobacco: Never   Substance Use Topics    Alcohol use: No      Current Outpatient Medications   Medication Sig Dispense Refill    amoxicillin-clavulanate (AUGMENTIN) 875-125 MG per tablet Take 1 tablet by mouth 2 times daily for 3 days 6 tablet 0    nadolol (CORGARD) 20 MG tablet TAKE 1 TABLET BY MOUTH AT BEDTIME FOR MIGRAINE PREVENTION      cloNIDine (CATAPRES) 0.1 MG/24HR PTWK Place 1 patch onto the skin once a week (Patient taking differently: Place 1 patch onto the skin once a week On ) 4 patch 5    buPROPion (WELLBUTRIN SR) 150 MG extended release tablet Take 300 mg by mouth at bedtime      ibuprofen (ADVIL;MOTRIN) 800 MG tablet Take 800 mg by mouth every 8 hours as needed      SUMAtriptan (IMITREX) 100 MG tablet 1/2 - 1 po @@ onset of migraine, may repeat in 2h prn       No current facility-administered medications for this visit. Allergies   Allergen Reactions    Cefaclor Other (See Comments)     Pt unsure of rx-- was as a child    Sulfa Antibiotics Other (See Comments)     Rx unknown -- was as a child       Review of Systems:  A comprehensive review of systems was negative. Objective: There were no vitals filed for this visit. Physical Exam:  Physical Exam:  General:  Alert, cooperative, no distress, appears stated age. Orientation alert and oriented to person place time and situation   Eyes:  Conjunctivae/corneas clear. PERRL, EOMs intact. Fundi benign   Ears:  Normal TMs and external ear canals both ears. Nose: Nares normal. Septum midline. Mucosa normal. No drainage or sinus tenderness. Mouth/Throat: Lips, mucosa, and tongue normal. Teeth and gums normal.   Neck: Supple, symmetrical, trachea midline, no adenopathy, thyroid: no enlargment/tenderness/nodules, no carotid bruit and no JVD. Back:   Symmetric, no curvature. ROM normal. No CVA tenderness. Lungs:   Clear to auscultation bilaterally. Heart:  Regular rate and rhythm, S1, S2 normal, no murmur, click, rub or gallop. Abdomen:   Soft, non-tender. Bowel sounds normal. No masses,  No organomegaly. No lymphadenopathy in all 4 extremities    Alignment- pt has mild obvious deformity of the left ankle    Range of motion-pain with any range of motion left ankle  Tenderness to palpation over the medial and normal    Vascular-distal pulses palpable in LEFT LOWER  Sensory/motor-deep tendon reflexes normal LEFT LOWER. Motor and sensory function intact. Stability- is difficult to assess stability because of the presence of the fracture      Skin- no rashes ulcerations or open wounds LEFT LOWER remedy and her pin sites are doing pretty well.   Her fracture blisters are resolving nicely    Gait-she cannot put any weight on the left lower extremity  Assessment:       Left trimalleolar ankle fracture now 2 weeks out and external fixator    Xrays and or studies:    No new x-rays today  Plan:     I have spoke with the patient regarding the fact that we are going to proceed back to the operating room tomorrow for removal of external fixator and open reduction internal fixation of her left bimalleolar ankle fracture. For her lateral malleolus we may try some sort of intramedullary either nir or screw as long as we feel like the fibula is out to length during the surgical procedure. For the medial malleolus I do think we probably have to open this to get it reduced I explained this to her today.   So the plan is to proceed with formal open reduction internal fixation of left trimalleolar ankle fracture without fixation of posterior lip as an outpatient tomorrow in the operating room    Signed By: Albert Wolf MD     November 8, 2022      a

## 2022-11-08 NOTE — PERIOP NOTE
Received telephone call from patient, Linwood Cruz regarding preop instructions as well as information regarding pre op arrival time of 0615 on 11/09/2022. Pre op instructions reviewed and all questions answered. Left contact information for any additional questions or needs.

## 2022-11-08 NOTE — H&P (VIEW-ONLY)
History and physical    Patient: Samuel Holguin MRN: [id number]  SSN: [id number]    YOB: 1990  Age: 33 y.o. Sex: female      Subjective:      Samuel Holguin is a 33 y.o. female who had a left bimalleolar/trimalleolar ankle fracture. She came into the operating room approximately week and a half ago at which time she was had so much swelling and fracture blisters we had to put her in an Ex-Fix and she now returns for planned staged procedure for removal of external fixator and open reduction internal fixation of her left bimalleolar ankle fracture. .    Past Medical History:   Diagnosis Date    ADHD     Anemia     Depression 2016    Hypertension     controlled with med    Left ankle pain     Migraine without aura and without status migrainosus, not intractable     Polycystic disease, ovaries     PONV (postoperative nausea and vomiting)     with c- sect     Preeclampsia 2021     delivery      Past Surgical History:   Procedure Laterality Date    ANKLE FRACTURE SURGERY Left 10/26/2022    LEFT ANKLE EXTERNAL FIXATOR performed by Bradford Knight MD at [facility] OR    [address] [place] [address]      c section x 2--  and       [facility] -No history of inflammatory arthritis   Social History     Tobacco Use    Smoking status: Never    Smokeless tobacco: Never   Substance Use Topics    Alcohol use: No      Current Outpatient Medications   Medication Sig Dispense Refill    amoxicillin-clavulanate (AUGMENTIN) 875-125 MG per tablet Take 1 tablet by mouth 2 times daily for 3 days 6 tablet 0    nadolol (CORGARD) 20 MG tablet TAKE 1 TABLET BY MOUTH AT BEDTIME FOR MIGRAINE PREVENTION      cloNIDine (CATAPRES) 0.1 MG/24HR PTWK Place 1 patch onto the skin once a week (Patient taking differently: Place 1 patch onto the skin once a week On ) 4 patch 5    buPROPion (WELLBUTRIN SR) 150 MG extended release tablet Take 300 mg by mouth at bedtime      ibuprofen (ADVIL;MOTRIN) 800 MG tablet blisters are resolving nicely    Gait-she cannot put any weight on the left lower extremity  Assessment:       Left trimalleolar ankle fracture now 2 weeks out and external fixator    Xrays and or studies:    No new x-rays today  Plan:     I have spoke with the patient regarding the fact that we are going to proceed back to the operating room tomorrow for removal of external fixator and open reduction internal fixation of her left bimalleolar ankle fracture. For her lateral malleolus we may try some sort of intramedullary either nir or screw as long as we feel like the fibula is out to length during the surgical procedure. For the medial malleolus I do think we probably have to open this to get it reduced I explained this to her today.   So the plan is to proceed with formal open reduction internal fixation of left trimalleolar ankle fracture without fixation of posterior lip as an outpatient tomorrow in the operating room    Signed By: Loren Macias MD     November 8, 2022

## 2022-11-08 NOTE — PERIOP NOTE
Telephone call from patient, Michael Potter regarding preop instructions as well as  and or family member of pre op arrival time *** on ***. All questions answered. Pre op instructions reviewed. Left contact information for any additional questions or needs.

## 2022-11-08 NOTE — H&P
History and physical    Patient: Dasia Mott MRN: [id number]  SSN: [id number]    YOB: 1990  Age: 33 y.o. Sex: female      Subjective:      Dasia Mott is a 33 y.o. female who had a left bimalleolar/trimalleolar ankle fracture. She came into the operating room approximately week and a half ago at which time she was had so much swelling and fracture blisters we had to put her in an Ex-Fix and she now returns for planned staged procedure for removal of external fixator and open reduction internal fixation of her left bimalleolar ankle fracture. .    Past Medical History:   Diagnosis Date    ADHD     Anemia     Depression 2016    Hypertension     controlled with med    Left ankle pain     Migraine without aura and without status migrainosus, not intractable     Polycystic disease, ovaries     PONV (postoperative nausea and vomiting)     with c- sect     Preeclampsia 2021     delivery      Past Surgical History:   Procedure Laterality Date    ANKLE FRACTURE SURGERY Left 10/26/2022    LEFT ANKLE EXTERNAL FIXATOR performed by Cleveland Mclaughlin MD at [facility] OR    [address] [place] [address]      c section x 2--  and       [facility] -No history of inflammatory arthritis   Social History     Tobacco Use    Smoking status: Never    Smokeless tobacco: Never   Substance Use Topics    Alcohol use: No      Current Outpatient Medications   Medication Sig Dispense Refill    amoxicillin-clavulanate (AUGMENTIN) 875-125 MG per tablet Take 1 tablet by mouth 2 times daily for 3 days 6 tablet 0    nadolol (CORGARD) 20 MG tablet TAKE 1 TABLET BY MOUTH AT BEDTIME FOR MIGRAINE PREVENTION      cloNIDine (CATAPRES) 0.1 MG/24HR PTWK Place 1 patch onto the skin once a week (Patient taking differently: Place 1 patch onto the skin once a week On ) 4 patch 5    buPROPion (WELLBUTRIN SR) 150 MG extended release tablet Take 300 mg by mouth at bedtime      ibuprofen (ADVIL;MOTRIN) 800 MG tablet Take 800 mg by mouth every 8 hours as needed      SUMAtriptan (IMITREX) 100 MG tablet 1/2 - 1 po @@ onset of migraine, may repeat in 2h prn       No current facility-administered medications for this visit. Allergies   Allergen Reactions    Cefaclor Other (See Comments)     Pt unsure of rx-- was as a child    Sulfa Antibiotics Other (See Comments)     Rx unknown -- was as a child       Review of Systems:  A comprehensive review of systems was negative. Objective: There were no vitals filed for this visit. Physical Exam:  Physical Exam:  General:  Alert, cooperative, no distress, appears stated age. Orientation alert and oriented to person place time and situation   Eyes:  Conjunctivae/corneas clear. PERRL, EOMs intact. Fundi benign   Ears:  Normal TMs and external ear canals both ears. Nose: Nares normal. Septum midline. Mucosa normal. No drainage or sinus tenderness. Mouth/Throat: Lips, mucosa, and tongue normal. Teeth and gums normal.   Neck: Supple, symmetrical, trachea midline, no adenopathy, thyroid: no enlargment/tenderness/nodules, no carotid bruit and no JVD. Back:   Symmetric, no curvature. ROM normal. No CVA tenderness. Lungs:   Clear to auscultation bilaterally. Heart:  Regular rate and rhythm, S1, S2 normal, no murmur, click, rub or gallop. Abdomen:   Soft, non-tender. Bowel sounds normal. No masses,  No organomegaly. No lymphadenopathy in all 4 extremities    Alignment- pt has mild obvious deformity of the left ankle    Range of motion-pain with any range of motion left ankle  Tenderness to palpation over the medial and normal    Vascular-distal pulses palpable in LEFT LOWER  Sensory/motor-deep tendon reflexes normal LEFT LOWER. Motor and sensory function intact. Stability- is difficult to assess stability because of the presence of the fracture      Skin- no rashes ulcerations or open wounds LEFT LOWER remedy and her pin sites are doing pretty well.   Her fracture blisters are resolving nicely    Gait-she cannot put any weight on the left lower extremity  Assessment:       Left trimalleolar ankle fracture now 2 weeks out and external fixator    Xrays and or studies:    No new x-rays today  Plan:     I have spoke with the patient regarding the fact that we are going to proceed back to the operating room tomorrow for removal of external fixator and open reduction internal fixation of her left bimalleolar ankle fracture. For her lateral malleolus we may try some sort of intramedullary either nir or screw as long as we feel like the fibula is out to length during the surgical procedure. For the medial malleolus I do think we probably have to open this to get it reduced I explained this to her today.   So the plan is to proceed with formal open reduction internal fixation of left trimalleolar ankle fracture without fixation of posterior lip as an outpatient tomorrow in the operating room    Signed By: Liat Moon MD     November 8, 2022

## 2022-11-09 ENCOUNTER — ANESTHESIA (OUTPATIENT)
Dept: SURGERY | Age: 32
End: 2022-11-09
Payer: COMMERCIAL

## 2022-11-09 ENCOUNTER — APPOINTMENT (OUTPATIENT)
Dept: GENERAL RADIOLOGY | Age: 32
End: 2022-11-09
Attending: ORTHOPAEDIC SURGERY
Payer: COMMERCIAL

## 2022-11-09 ENCOUNTER — HOSPITAL ENCOUNTER (OUTPATIENT)
Age: 32
Setting detail: OUTPATIENT SURGERY
Discharge: HOME | End: 2022-11-09
Attending: ORTHOPAEDIC SURGERY | Admitting: ORTHOPAEDIC SURGERY
Payer: COMMERCIAL

## 2022-11-09 VITALS
RESPIRATION RATE: 15 BRPM | TEMPERATURE: 98.5 F | DIASTOLIC BLOOD PRESSURE: 73 MMHG | SYSTOLIC BLOOD PRESSURE: 128 MMHG | WEIGHT: 220 LBS | HEIGHT: 68 IN | BODY MASS INDEX: 33.34 KG/M2 | OXYGEN SATURATION: 96 % | HEART RATE: 67 BPM

## 2022-11-09 DIAGNOSIS — S82.842A ANKLE FRACTURE, BIMALLEOLAR, CLOSED, LEFT, INITIAL ENCOUNTER: Primary | ICD-10-CM

## 2022-11-09 LAB — HCG UR QL: NEGATIVE

## 2022-11-09 PROCEDURE — 73610 X-RAY EXAM OF ANKLE: CPT

## 2022-11-09 PROCEDURE — 2580000003 HC RX 258: Performed by: ORTHOPAEDIC SURGERY

## 2022-11-09 PROCEDURE — 76942 ECHO GUIDE FOR BIOPSY: CPT | Performed by: ANESTHESIOLOGY

## 2022-11-09 PROCEDURE — 6360000002 HC RX W HCPCS: Performed by: NURSE ANESTHETIST, CERTIFIED REGISTERED

## 2022-11-09 PROCEDURE — 27822 TREATMENT OF ANKLE FRACTURE: CPT | Performed by: ORTHOPAEDIC SURGERY

## 2022-11-09 PROCEDURE — 2500000003 HC RX 250 WO HCPCS: Performed by: ANESTHESIOLOGY

## 2022-11-09 PROCEDURE — 6370000000 HC RX 637 (ALT 250 FOR IP): Performed by: ANESTHESIOLOGY

## 2022-11-09 PROCEDURE — 6360000002 HC RX W HCPCS: Performed by: ANESTHESIOLOGY

## 2022-11-09 PROCEDURE — 7100000010 HC PHASE II RECOVERY - FIRST 15 MIN: Performed by: ORTHOPAEDIC SURGERY

## 2022-11-09 PROCEDURE — 3700000001 HC ADD 15 MINUTES (ANESTHESIA): Performed by: ORTHOPAEDIC SURGERY

## 2022-11-09 PROCEDURE — 6370000000 HC RX 637 (ALT 250 FOR IP): Performed by: ORTHOPAEDIC SURGERY

## 2022-11-09 PROCEDURE — 81025 URINE PREGNANCY TEST: CPT

## 2022-11-09 PROCEDURE — 2500000003 HC RX 250 WO HCPCS: Performed by: NURSE ANESTHETIST, CERTIFIED REGISTERED

## 2022-11-09 PROCEDURE — 3700000000 HC ANESTHESIA ATTENDED CARE: Performed by: ORTHOPAEDIC SURGERY

## 2022-11-09 PROCEDURE — 7100000011 HC PHASE II RECOVERY - ADDTL 15 MIN: Performed by: ORTHOPAEDIC SURGERY

## 2022-11-09 PROCEDURE — 64445 NJX AA&/STRD SCIATIC NRV IMG: CPT | Performed by: ANESTHESIOLOGY

## 2022-11-09 PROCEDURE — 6360000002 HC RX W HCPCS: Performed by: ORTHOPAEDIC SURGERY

## 2022-11-09 PROCEDURE — 2720000010 HC SURG SUPPLY STERILE: Performed by: ORTHOPAEDIC SURGERY

## 2022-11-09 PROCEDURE — 3600000004 HC SURGERY LEVEL 4 BASE: Performed by: ORTHOPAEDIC SURGERY

## 2022-11-09 PROCEDURE — C1769 GUIDE WIRE: HCPCS | Performed by: ORTHOPAEDIC SURGERY

## 2022-11-09 PROCEDURE — 64447 NJX AA&/STRD FEMORAL NRV IMG: CPT | Performed by: ANESTHESIOLOGY

## 2022-11-09 PROCEDURE — C1713 ANCHOR/SCREW BN/BN,TIS/BN: HCPCS | Performed by: ORTHOPAEDIC SURGERY

## 2022-11-09 PROCEDURE — 7100000000 HC PACU RECOVERY - FIRST 15 MIN: Performed by: ORTHOPAEDIC SURGERY

## 2022-11-09 PROCEDURE — 3600000014 HC SURGERY LEVEL 4 ADDTL 15MIN: Performed by: ORTHOPAEDIC SURGERY

## 2022-11-09 PROCEDURE — 2709999900 HC NON-CHARGEABLE SUPPLY: Performed by: ORTHOPAEDIC SURGERY

## 2022-11-09 PROCEDURE — 7100000001 HC PACU RECOVERY - ADDTL 15 MIN: Performed by: ORTHOPAEDIC SURGERY

## 2022-11-09 DEVICE — SCREW BNE L36MM DIA2.7MM CORT S STL ST FULL THRD FOR SM: Type: IMPLANTABLE DEVICE | Site: ANKLE | Status: FUNCTIONAL

## 2022-11-09 DEVICE — SCREW BNE L110MM DIA3.5MM HD DIA6MM CORT S STL ST W/ SM HEX: Type: IMPLANTABLE DEVICE | Site: ANKLE | Status: FUNCTIONAL

## 2022-11-09 DEVICE — SCREW BNE L40MM DIA4MM S STL CANN SHT 1/3 THRD SM HEX SOCK: Type: IMPLANTABLE DEVICE | Site: ANKLE | Status: FUNCTIONAL

## 2022-11-09 RX ORDER — ROPIVACAINE HYDROCHLORIDE 5 MG/ML
INJECTION, SOLUTION EPIDURAL; INFILTRATION; PERINEURAL
Status: COMPLETED | OUTPATIENT
Start: 2022-11-09 | End: 2022-11-09

## 2022-11-09 RX ORDER — ACETAMINOPHEN 500 MG
1000 TABLET ORAL EVERY 4 HOURS PRN
Status: DISCONTINUED | OUTPATIENT
Start: 2022-11-09 | End: 2022-11-09 | Stop reason: HOSPADM

## 2022-11-09 RX ORDER — OXYCODONE HYDROCHLORIDE AND ACETAMINOPHEN 5; 325 MG/1; MG/1
2 TABLET ORAL EVERY 4 HOURS PRN
Qty: 60 TABLET | Refills: 0 | Status: SHIPPED | OUTPATIENT
Start: 2022-11-09 | End: 2022-11-14

## 2022-11-09 RX ORDER — BUPIVACAINE HYDROCHLORIDE AND EPINEPHRINE 2.5; 5 MG/ML; UG/ML
INJECTION, SOLUTION EPIDURAL; INFILTRATION; INTRACAUDAL; PERINEURAL
Status: DISCONTINUED | OUTPATIENT
Start: 2022-11-09 | End: 2022-11-09 | Stop reason: SDUPTHER

## 2022-11-09 RX ORDER — BUPIVACAINE HYDROCHLORIDE AND EPINEPHRINE 5; 5 MG/ML; UG/ML
INJECTION, SOLUTION EPIDURAL; INTRACAUDAL; PERINEURAL
Status: DISCONTINUED | OUTPATIENT
Start: 2022-11-09 | End: 2022-11-09 | Stop reason: SDUPTHER

## 2022-11-09 RX ORDER — EPHEDRINE SULFATE/0.9% NACL/PF 50 MG/5 ML
SYRINGE (ML) INTRAVENOUS PRN
Status: DISCONTINUED | OUTPATIENT
Start: 2022-11-09 | End: 2022-11-09 | Stop reason: SDUPTHER

## 2022-11-09 RX ORDER — LIDOCAINE HYDROCHLORIDE 20 MG/ML
INJECTION, SOLUTION INFILTRATION; PERINEURAL
Status: DISCONTINUED | OUTPATIENT
Start: 2022-11-09 | End: 2022-11-09 | Stop reason: SDUPTHER

## 2022-11-09 RX ORDER — SODIUM CHLORIDE, SODIUM LACTATE, POTASSIUM CHLORIDE, CALCIUM CHLORIDE 600; 310; 30; 20 MG/100ML; MG/100ML; MG/100ML; MG/100ML
INJECTION, SOLUTION INTRAVENOUS CONTINUOUS
Status: DISCONTINUED | OUTPATIENT
Start: 2022-11-09 | End: 2022-11-09 | Stop reason: HOSPADM

## 2022-11-09 RX ORDER — HYDRALAZINE HYDROCHLORIDE 20 MG/ML
10 INJECTION INTRAMUSCULAR; INTRAVENOUS
Status: DISCONTINUED | OUTPATIENT
Start: 2022-11-09 | End: 2022-11-09 | Stop reason: HOSPADM

## 2022-11-09 RX ORDER — LIDOCAINE HYDROCHLORIDE 10 MG/ML
1 INJECTION, SOLUTION INFILTRATION; PERINEURAL
Status: DISCONTINUED | OUTPATIENT
Start: 2022-11-09 | End: 2022-11-09 | Stop reason: HOSPADM

## 2022-11-09 RX ORDER — OXYCODONE HYDROCHLORIDE 5 MG/1
5 TABLET ORAL
Status: COMPLETED | OUTPATIENT
Start: 2022-11-09 | End: 2022-11-09

## 2022-11-09 RX ORDER — HYDROMORPHONE HYDROCHLORIDE 2 MG/ML
0.25 INJECTION, SOLUTION INTRAMUSCULAR; INTRAVENOUS; SUBCUTANEOUS EVERY 5 MIN PRN
Status: DISCONTINUED | OUTPATIENT
Start: 2022-11-09 | End: 2022-11-09 | Stop reason: HOSPADM

## 2022-11-09 RX ORDER — ONDANSETRON 2 MG/ML
INJECTION INTRAMUSCULAR; INTRAVENOUS PRN
Status: DISCONTINUED | OUTPATIENT
Start: 2022-11-09 | End: 2022-11-09 | Stop reason: SDUPTHER

## 2022-11-09 RX ORDER — PROCHLORPERAZINE EDISYLATE 5 MG/ML
5 INJECTION INTRAMUSCULAR; INTRAVENOUS
Status: COMPLETED | OUTPATIENT
Start: 2022-11-09 | End: 2022-11-09

## 2022-11-09 RX ORDER — PROPOFOL 10 MG/ML
INJECTION, EMULSION INTRAVENOUS PRN
Status: DISCONTINUED | OUTPATIENT
Start: 2022-11-09 | End: 2022-11-09 | Stop reason: SDUPTHER

## 2022-11-09 RX ORDER — FLUCONAZOLE 100 MG/1
100 TABLET ORAL DAILY
Qty: 7 TABLET | Refills: 0 | Status: SHIPPED | OUTPATIENT
Start: 2022-11-09 | End: 2022-11-16

## 2022-11-09 RX ORDER — MIDAZOLAM HYDROCHLORIDE 2 MG/2ML
2 INJECTION, SOLUTION INTRAMUSCULAR; INTRAVENOUS
Status: COMPLETED | OUTPATIENT
Start: 2022-11-09 | End: 2022-11-09

## 2022-11-09 RX ORDER — KETOROLAC TROMETHAMINE 30 MG/ML
INJECTION, SOLUTION INTRAMUSCULAR; INTRAVENOUS PRN
Status: DISCONTINUED | OUTPATIENT
Start: 2022-11-09 | End: 2022-11-09 | Stop reason: SDUPTHER

## 2022-11-09 RX ORDER — FENTANYL CITRATE 50 UG/ML
100 INJECTION, SOLUTION INTRAMUSCULAR; INTRAVENOUS
Status: COMPLETED | OUTPATIENT
Start: 2022-11-09 | End: 2022-11-09

## 2022-11-09 RX ORDER — FENTANYL CITRATE 50 UG/ML
INJECTION, SOLUTION INTRAMUSCULAR; INTRAVENOUS PRN
Status: DISCONTINUED | OUTPATIENT
Start: 2022-11-09 | End: 2022-11-09 | Stop reason: SDUPTHER

## 2022-11-09 RX ORDER — ONDANSETRON 2 MG/ML
4 INJECTION INTRAMUSCULAR; INTRAVENOUS
Status: DISCONTINUED | OUTPATIENT
Start: 2022-11-09 | End: 2022-11-09 | Stop reason: HOSPADM

## 2022-11-09 RX ORDER — LABETALOL HYDROCHLORIDE 5 MG/ML
10 INJECTION, SOLUTION INTRAVENOUS
Status: DISCONTINUED | OUTPATIENT
Start: 2022-11-09 | End: 2022-11-09 | Stop reason: HOSPADM

## 2022-11-09 RX ORDER — LIDOCAINE HYDROCHLORIDE 20 MG/ML
INJECTION, SOLUTION EPIDURAL; INFILTRATION; INTRACAUDAL; PERINEURAL PRN
Status: DISCONTINUED | OUTPATIENT
Start: 2022-11-09 | End: 2022-11-09 | Stop reason: SDUPTHER

## 2022-11-09 RX ORDER — HYDROMORPHONE HYDROCHLORIDE 2 MG/ML
0.5 INJECTION, SOLUTION INTRAMUSCULAR; INTRAVENOUS; SUBCUTANEOUS EVERY 5 MIN PRN
Status: DISCONTINUED | OUTPATIENT
Start: 2022-11-09 | End: 2022-11-09 | Stop reason: HOSPADM

## 2022-11-09 RX ADMIN — BUPIVACAINE HYDROCHLORIDE AND EPINEPHRINE 5 ML: 2.5; 5 INJECTION, SOLUTION EPIDURAL; INFILTRATION; INTRACAUDAL; PERINEURAL at 12:14

## 2022-11-09 RX ADMIN — FENTANYL CITRATE 50 MCG: 50 INJECTION, SOLUTION INTRAMUSCULAR; INTRAVENOUS at 10:13

## 2022-11-09 RX ADMIN — ONDANSETRON 4 MG: 2 INJECTION INTRAMUSCULAR; INTRAVENOUS at 09:30

## 2022-11-09 RX ADMIN — BUPIVACAINE HYDROCHLORIDE AND EPINEPHRINE 5 ML: 5; 5 INJECTION, SOLUTION EPIDURAL; INTRACAUDAL; PERINEURAL at 12:14

## 2022-11-09 RX ADMIN — OXYCODONE 5 MG: 5 TABLET ORAL at 10:51

## 2022-11-09 RX ADMIN — ACETAMINOPHEN 1000 MG: 500 TABLET ORAL at 08:22

## 2022-11-09 RX ADMIN — HYDROMORPHONE HYDROCHLORIDE 0.5 MG: 2 INJECTION, SOLUTION INTRAMUSCULAR; INTRAVENOUS; SUBCUTANEOUS at 11:13

## 2022-11-09 RX ADMIN — MIDAZOLAM 2 MG: 1 INJECTION INTRAMUSCULAR; INTRAVENOUS at 08:02

## 2022-11-09 RX ADMIN — Medication 3 AMPULE: at 07:23

## 2022-11-09 RX ADMIN — FENTANYL CITRATE 50 MCG: 50 INJECTION, SOLUTION INTRAMUSCULAR; INTRAVENOUS at 09:14

## 2022-11-09 RX ADMIN — SODIUM CHLORIDE, SODIUM LACTATE, POTASSIUM CHLORIDE, AND CALCIUM CHLORIDE: 600; 310; 30; 20 INJECTION, SOLUTION INTRAVENOUS at 10:24

## 2022-11-09 RX ADMIN — FENTANYL CITRATE 50 MCG: 50 INJECTION, SOLUTION INTRAMUSCULAR; INTRAVENOUS at 08:03

## 2022-11-09 RX ADMIN — FENTANYL CITRATE 50 MCG: 50 INJECTION, SOLUTION INTRAMUSCULAR; INTRAVENOUS at 08:58

## 2022-11-09 RX ADMIN — HYDROMORPHONE HYDROCHLORIDE 0.5 MG: 2 INJECTION, SOLUTION INTRAMUSCULAR; INTRAVENOUS; SUBCUTANEOUS at 10:50

## 2022-11-09 RX ADMIN — Medication 2000 MG: at 09:03

## 2022-11-09 RX ADMIN — KETOROLAC TROMETHAMINE 30 MG: 30 INJECTION, SOLUTION INTRAMUSCULAR; INTRAVENOUS at 10:17

## 2022-11-09 RX ADMIN — PROPOFOL 200 MG: 10 INJECTION, EMULSION INTRAVENOUS at 08:58

## 2022-11-09 RX ADMIN — LIDOCAINE HYDROCHLORIDE 60 MG: 20 INJECTION, SOLUTION EPIDURAL; INFILTRATION; INTRACAUDAL; PERINEURAL at 08:58

## 2022-11-09 RX ADMIN — ROPIVACAINE HYDROCHLORIDE 30 ML: 5 INJECTION, SOLUTION EPIDURAL; INFILTRATION; PERINEURAL at 08:00

## 2022-11-09 RX ADMIN — FENTANYL CITRATE 50 MCG: 50 INJECTION, SOLUTION INTRAMUSCULAR; INTRAVENOUS at 10:11

## 2022-11-09 RX ADMIN — ROPIVACAINE HYDROCHLORIDE 20 ML: 5 INJECTION, SOLUTION EPIDURAL; INFILTRATION; PERINEURAL at 08:08

## 2022-11-09 RX ADMIN — BUPIVACAINE HYDROCHLORIDE AND EPINEPHRINE BITARTRATE 7.5 ML: 2.5; .005 INJECTION, SOLUTION EPIDURAL; INFILTRATION; INTRACAUDAL; PERINEURAL at 12:10

## 2022-11-09 RX ADMIN — PROCHLORPERAZINE EDISYLATE 5 MG: 5 INJECTION INTRAMUSCULAR; INTRAVENOUS at 10:55

## 2022-11-09 RX ADMIN — Medication 10 MG: at 09:11

## 2022-11-09 RX ADMIN — BUPIVACAINE HYDROCHLORIDE AND EPINEPHRINE BITARTRATE 7.5 ML: 5; .005 INJECTION, SOLUTION EPIDURAL; INTRACAUDAL; PERINEURAL at 12:10

## 2022-11-09 RX ADMIN — SODIUM CHLORIDE, SODIUM LACTATE, POTASSIUM CHLORIDE, AND CALCIUM CHLORIDE: 600; 310; 30; 20 INJECTION, SOLUTION INTRAVENOUS at 07:20

## 2022-11-09 RX ADMIN — Medication 10 MG: at 09:08

## 2022-11-09 RX ADMIN — PROPOFOL 40 MG: 10 INJECTION, EMULSION INTRAVENOUS at 12:10

## 2022-11-09 RX ADMIN — HYDROMORPHONE HYDROCHLORIDE 0.5 MG: 2 INJECTION, SOLUTION INTRAMUSCULAR; INTRAVENOUS; SUBCUTANEOUS at 11:03

## 2022-11-09 RX ADMIN — LIDOCAINE HYDROCHLORIDE 5 ML: 20 INJECTION, SOLUTION INFILTRATION; PERINEURAL at 12:10

## 2022-11-09 ASSESSMENT — PAIN - FUNCTIONAL ASSESSMENT
PAIN_FUNCTIONAL_ASSESSMENT: 0-10
PAIN_FUNCTIONAL_ASSESSMENT: 0-10
PAIN_FUNCTIONAL_ASSESSMENT: WONG-BAKER FACES
PAIN_FUNCTIONAL_ASSESSMENT: 0-10

## 2022-11-09 ASSESSMENT — PAIN DESCRIPTION - LOCATION
LOCATION: ANKLE
LOCATION: ANKLE

## 2022-11-09 ASSESSMENT — PAIN SCALES - GENERAL
PAINLEVEL_OUTOF10: 9
PAINLEVEL_OUTOF10: 8

## 2022-11-09 ASSESSMENT — PAIN DESCRIPTION - DESCRIPTORS: DESCRIPTORS: ACHING

## 2022-11-09 NOTE — ANESTHESIA PROCEDURE NOTES
Airway  Date/Time: 11/9/2022 8:59 AM  Urgency: elective    Airway not difficult    General Information and Staff    Patient location during procedure: OR  Resident/CRNA: CHINEDU Quintero CRNA    Indications and Patient Condition  Indications for airway management: anesthesia  Spontaneous Ventilation: absent  Sedation level: deep  Preoxygenated: yes  Patient position: sniffing  MILS maintained throughout  Mask difficulty assessment: not attempted    Final Airway Details  Final airway type: supraglottic airway      Successful airway: oropharyngeal  Size 4     Number of attempts at approach: 1  Ventilation between attempts: bag mask  Number of other approaches attempted: 0    no

## 2022-11-09 NOTE — PERIOP NOTE
Dr. Hugo Barclay called re: pt's request for Diflucan due to antibiotics. Dr stated he would fax a prescription to pt's pharmacy.

## 2022-11-09 NOTE — DISCHARGE INSTRUCTIONS
Nonweightbearing on operative extremity, Leave dressing intact until follow-up, and Resume regular diet    ACTIVITY  As tolerated and as directed by your doctor. Bathe or shower as directed by your doctor. DIET  Clear liquids until no nausea or vomiting; then light diet for the first day. Advance to regular diet on second day, unless your doctor orders otherwise. If nausea and vomiting continues, call your doctor. PAIN  Take pain medication as directed by your doctor. Call your doctor if pain is NOT relieved by medication. DO NOT take aspirin of blood thinners unless directed by your doctor. MEDICATION INTERACTION:During your procedure you potentially received a medication or medications which may reduce the effectiveness of oral contraceptives. Please consider other forms of contraception for 1 month following your procedure if you are currently using oral contraceptives as your primary form of birth control. In addition to this, we recommend continuing your oral contraceptive as prescribed, unless otherwise instructed by your physician, during this time      [address] IF   Excessive bleeding that does not stop after holding pressure over the area  Temperature of 101 degrees F or above  Excessive redness, swelling or bruising, and/ or green or yellow, smelly discharge from incision    After general anesthesia or intravenous sedation, for 24 hours or while taking prescription Narcotics:  Limit your activities  A responsible adult needs to be with you for the next 24 hours  Do not drive and operate hazardous machinery  Do not make important personal or business decisions  Do not drink alcoholic beverages  If you have not urinated within 8 hours after discharge, and you are experiencing discomfort from urinary retention, please go to the nearest ED. If you have sleep apnea and have a CPAP machine, please use it for all naps and sleeping.   Please use caution when taking narcotics and any of your home medications that may cause drowsiness. *  Please give a list of your current medications to your Primary Care Provider. *  Please update this list whenever your medications are discontinued, doses are      changed, or new medications (including over-the-counter products) are added. *  Please carry medication information at all times in case of emergency situations. These are general instructions for a healthy lifestyle:  No smoking/ No tobacco products/ Avoid exposure to second hand smoke  Surgeon General's Warning:  Quitting smoking now greatly reduces serious risk to your health. Obesity, smoking, and sedentary lifestyle greatly increases your risk for illness  A healthy diet, regular physical exercise & weight monitoring are important for maintaining a healthy lifestyle    You may be retaining fluid if you have a history of heart failure or if you experience any of the following symptoms:  Weight gain of 3 pounds or more overnight or 5 pounds in a week, increased swelling in our hands or feet or shortness of breath while lying flat in bed. Please call your doctor as soon as you notice any of these symptoms; do not wait until your next office visit.

## 2022-11-09 NOTE — ANESTHESIA PRE PROCEDURE
Department of Anesthesiology  Preprocedure Note       Name:  Joellen Zuniga   Age:  33 y.o.  :  1990                                          MRN:  [id number]         Date:  2022      Surgeon: Tomasa Sen):  Pradeep Clements MD    Procedure: Procedure(s):  ANKLE OPEN REDUCTION INTERNAL FIXATION    Medications prior to admission:   Prior to Admission medications    Medication Sig Start Date End Date Taking?  Authorizing Provider   amoxicillin-clavulanate (AUGMENTIN) 875-125 MG per tablet Take 1 tablet by mouth 2 times daily for 3 days  Patient taking differently: Take 1 tablet by mouth 2 times daily Per pt-- to start 22 pm 22  Pradeep Clements MD   nadolol (CORGARD) 20 MG tablet TAKE 1 TABLET BY MOUTH AT BEDTIME FOR MIGRAINE PREVENTION 22   Historical Provider, MD   cloNIDine (CATAPRES) 0.1 MG/24HR PTWK Place 1 patch onto the skin once a week  Patient taking differently: Place 1 patch onto the skin once a week On 22   Ludivina Lima PA-C   buPROPion [facility] SR) 150 MG extended release tablet Take 300 mg by mouth at bedtime    Ar Automatic Reconciliation   ibuprofen (ADVIL;MOTRIN) 800 MG tablet Take 800 mg by mouth every 8 hours as needed 21   Ar Automatic Reconciliation   SUMAtriptan (IMITREX) 100 MG tablet 1/2 - 1 po @@ onset of migraine, may repeat in 2h prn 21   Ar Automatic Reconciliation       Current medications:    Current Facility-Administered Medications   Medication Dose Route Frequency Provider Last Rate Last Admin    lidocaine 1 % injection 1 mL  1 mL IntraDERmal Once PRN Jens Baugh DO        ceFAZolin (ANCEF) 2000 mg in sterile water 20 mL IV syringe  2,000 mg IntraVENous On Call to 1100 Raza Rizo MD        lactated ringers infusion   IntraVENous Continuous Pradeep Clements  mL/hr at 22 0720 New Bag at 22 0720    acetaminophen (TYLENOL) tablet 1,000 mg  1,000 mg Oral Q4H PRN Mckayla Howard DO Allergies: Allergies   Allergen Reactions    Cefaclor Other (See Comments)     Pt unsure of rx-- was as a child    Sulfa Antibiotics Other (See Comments)     Rx unknown -- was as a child       Problem List:    Patient Active Problem List   Diagnosis Code    History of cholestasis during pregnancy Z87.59, Z87.19    High-risk pregnancy in second trimester O09.92    Obesity affecting pregnancy in second trimester O99.212    Preeclampsia O14.90    History of  delivery, currently pregnant in second trimester O09.892    Pre-eclampsia O14.90    BMI 36.0-36.9,adult Z68.36     delivery delivered O82    Previous  section complicating pregnancy P75.903    Depression affecting pregnancy in second trimester, antepartum O99.342, F31. A    Chronic benign essential hypertension in second trimester O10.012    History of twin pregnancy in prior pregnancy Z87.59    Anemia in pregnancy O99.019    Ankle fracture, bimalleolar, closed, left, initial encounter S82.147J       Past Medical History:        Diagnosis Date    ADHD     Depression 2016    Hypertension     controlled with med    Left ankle pain     Migraine without aura and without status migrainosus, not intractable     Polycystic disease, ovaries     PONV (postoperative nausea and vomiting)     with c- sect 2016    Preeclampsia 2021     delivery        Past Surgical History:        Procedure Laterality Date    ANKLE FRACTURE SURGERY Left 10/26/2022    LEFT ANKLE EXTERNAL FIXATOR performed by Juhi Cavazos MD at [address]      c section x 2--  and     MOUTH SURGERY  2022    oral surg--       Social History:    Social History     Tobacco Use    Smoking status: Never    Smokeless tobacco: Never   Substance Use Topics    Alcohol use:  No                                Counseling given: Not Answered      Vital Signs (Current):   Vitals:    22 0800 22 0805 11/09/22 0810 11/09/22 0815   BP: (!) 161/103 (!) 157/83 (!) 154/87 (!) 159/83   Pulse: 65 69 75 100   Resp: 16 16 16 16   Temp:       TempSrc:       SpO2: 100% 100% 100% 100%   Weight:       Height:                                                  BP Readings from Last 3 Encounters:   11/09/22 (!) 159/83   10/26/22 118/82   10/24/22 (!) 154/108       NPO Status: Time of last liquid consumption: 0000                        Time of last solid consumption: 0000                        Date of last liquid consumption: 11/08/22                        Date of last solid food consumption: 11/08/22    BMI:   Wt Readings from Last 3 Encounters:   11/09/22 220 lb (99.8 kg)   10/26/22 220 lb (99.8 kg)   10/24/22 240 lb (108.9 kg)     Body mass index is 33.45 kg/m². CBC:   Lab Results   Component Value Date/Time    WBC 10.4 06/17/2021 06:07 AM    RBC 2.96 06/17/2021 06:07 AM    HGB 11.0 10/26/2022 08:07 AM    HCT 24.9 06/18/2021 05:37 AM    MCV 80.7 06/17/2021 06:07 AM    RDW 21.2 06/17/2021 06:07 AM     06/17/2021 06:07 AM       CMP:   Lab Results   Component Value Date/Time     06/11/2021 06:59 AM    K 4.4 06/11/2021 06:59 AM     06/11/2021 06:59 AM    CO2 23 06/11/2021 06:59 AM    BUN 10 06/11/2021 06:59 AM    CREATININE 0.81 06/11/2021 06:59 AM    GFRAA >60 06/11/2021 06:59 AM    AGRATIO 0.6 06/11/2021 06:59 AM    GLUCOSE 80 06/11/2021 06:59 AM    PROT 6.6 06/11/2021 06:59 AM    CALCIUM 8.8 06/11/2021 06:59 AM    BILITOT 0.2 06/11/2021 06:59 AM    ALKPHOS 122 06/11/2021 06:59 AM    AST 34 06/11/2021 06:59 AM    ALT 22 06/11/2021 06:59 AM       POC Tests: No results for input(s): POCGLU, POCNA, POCK, POCCL, POCBUN, POCHEMO, POCHCT in the last 72 hours.     Coags: No results found for: PROTIME, INR, APTT    HCG (If Applicable):   Lab Results   Component Value Date    PREGTESTUR Negative 11/09/2022        ABGs:   Lab Results   Component Value Date/Time    PIV1CYV 29.6 06/16/2021 11:04 AM    BEART 0.1 06/16/2021 11:04 AM    BEART 2.5 06/16/2021 11:04 AM        Type & Screen (If Applicable):  No results found for: LABABO, LABRH    Drug/Infectious Status (If Applicable):  No results found for: HIV, HEPCAB    COVID-19 Screening (If Applicable): No results found for: COVID19        Anesthesia Evaluation  Patient summary reviewed and Nursing notes reviewed  Airway: Mallampati: II          Dental:    (+) upper dentures      Pulmonary:Negative Pulmonary ROS and normal exam  breath sounds clear to auscultation                             Cardiovascular:  Exercise tolerance: good (>4 METS),   (+) hypertension:,         Rhythm: regular  Rate: normal                    Neuro/Psych:   (+) headaches:, psychiatric history: stable with treatment            GI/Hepatic/Renal: Neg GI/Hepatic/Renal ROS            Endo/Other: Negative Endo/Other ROS                    Abdominal:             Vascular: Other Findings:           Anesthesia Plan      TIVA     ASA 2     (R/b/i of GA as well as peripheral nerve blocks discussed at length with pt. Pt expresses understanding, questions answered)  Induction: intravenous. MIPS: Postoperative opioids intended and Prophylactic antiemetics administered. Anesthetic plan and risks discussed with patient and spouse.               Post-op pain plan if not by surgeon: single peripheral nerve block            Young Becker DO   11/9/2022

## 2022-11-09 NOTE — OP NOTE
Operative Report    Patient: Norm Hackett MRN: [id number]  SSN: [id number]    YOB: 1990  Age: 33 y.o. Sex: female       Date of Surgery: November 9, 2022     History:  Norm Hackett is a 33 y.o. female who presents for staged and planned removal of external fixator and open reduction internal fixation of a left trimalleolar ankle fracture. I had talked to her about the overall plan. The plan is for either intramedullary versus plate fixation of the left lateral malleolus and then screw fixation of the medial malleolus. The posterior malleolar fragment is very small and will not need fixation and we will be able to remove her external fixator. I talked her about this she seemed to feel comfortable consenting. I talked to the patient and/or their representative and explained the exact nature the procedure. I also went through a detailed list of the material risks associated with  the procedure which included risk of bleeding, infection, injury to nearby structures, worsening the situation, as well as the risks associate with anesthesia and finally death. Also talked with him regarding the benefits and alternatives to the procedure. Preoperative Diagnosis: Ankle fracture, bimalleolar, closed, left, initial encounter [G40.042D]     Postoperative Diagnosis:   Left trimalleolar ankle fracture closed and displaced      Surgeon(s) and Role:     * Marybeth Heard MD - Primary    Anesthesia: General     Procedure: Open treatment of left trimalleolar ankle fracture without fixation of posterior lip    Procedure in Detail: After the successful induction of general anesthetic the left lower extremity was examined we remove the external fixator by removing first the rods and clamps from the external fixator and then the pins themselves. We did place Betadine over the transfixion pins before removing these.   Once we did this I then scrubbed the left lower extremity and she did have some fracture blisters and there was some overlying skin that was removed and she had very healthy skin underneath this. We then prepped and draped the left lower extremity I placed Acticoat and Guinenelly Estefania over the external fixator pin sites then we began the process of treating her ankle fracture. For the lateral malleolus she still had the area of fracture blister which was sort of some compromise skin and it did appear that her fibula was out to length so I thought it was reasonable to try intramedullary fixation of this I made a small incision distally and then placed a 2.5 mm drill bit and then eventually a 3.5 millimeters screw as an intramedullary screw. After this was in place I  the position of the screw as well as my overall reduction I did think her fibula for the most part was out to length and the ankle mortise was very well aligned. I then turned my attention to the medial malleolus. I made a small incision trying to skirt the previous fracture blisters and after making a small incision I was able to dissect down to the area the fracture I removed some hematoma and some soft tissue from the actual fracture site itself irrigated with normal saline and then placed a small reduction forcep over this and reduced this anatomically. I confirmed that this was anatomic radiographically and then placed 2 screws one was a 4.0 cannulated screw 1 was a 2.7 millimeter screw across the medial malleolar fracture site. I chose to use a 2.7 millimeter screw just because it was such a small fragment of bone graft both the screws were in place I then  my overall reduction as well as a place my hardware was very pleased with this.   Again the posterior malleolar fragment was a very small piece I did not think it needed any sort of fixation and she had a very good reduction of the ankle mortise so at that point I then closed my incision with two-point 0 Monocryl and staples dressings were applied as well as a posterior splint the patient was awakened taken recovery in stable condition there were no apparent complications      Estimated Blood Loss: 30 cc    Tourniquet Time: * No tourniquets in log *      Implants: * No implants in log *            Specimens: * No specimens in log *        Drains: None                Complications: None    Counts: Sponge and needle counts were correct times two.     Signed By:  Walter Armijo MD     November 9, 2022

## 2022-11-09 NOTE — ANESTHESIA PROCEDURE NOTES
Peripheral Block    Patient location during procedure: pre-op  Reason for block: post-op pain management and at surgeon's request  Start time: 11/9/2022 8:08 AM  End time: 11/9/2022 8:13 AM  Staffing  Performed: anesthesiologist   Anesthesiologist: Harlan Ng DO  Preanesthetic Checklist  Completed: patient identified, IV checked, site marked, risks and benefits discussed, surgical/procedural consents, equipment checked, pre-op evaluation, timeout performed, anesthesia consent given, oxygen available and monitors applied/VS acknowledged  Peripheral Block   Patient position: supine  Prep: ChloraPrep  Provider prep: mask and sterile gloves  Patient monitoring: cardiac monitor, continuous pulse ox, frequent blood pressure checks, responsive to questions and oxygen  Block type: Femoral  Adductor canal  Laterality: left  Injection technique: single-shot  Guidance: ultrasound guided  Local infiltration: lidocaine  Infiltration strength: 1 %  Local infiltration: lidocaine  Dose: 3 mL    Needle   Needle type: insulated echogenic nerve stimulator needle   Needle gauge: 20 G  Needle localization: ultrasound guidance  Needle length: 10 cm  Assessment   Injection assessment: negative aspiration for heme, no paresthesia on injection, local visualized surrounding nerve on ultrasound and no intravascular symptoms  Paresthesia pain: none  Slow fractionated injection: yes  Hemodynamics: stable  Real-time US image taken/store: yes  Outcomes: uncomplicated and patient tolerated procedure well    Additional Notes  R/B/I discussed at length with pt including damage to nerve or muscle. Needle inserted under real time Ultrasound guidance and placed in close proximity to nerve being blocked. Ultrasound was used in real time to visualize spread of local anesthetic around nerve being blocked.   Nerve and surrounding structures appeared grossly normal.  A permanent Ultrasound image was stored in the chart  Medications Administered  dexamethasone 4 MG/ML - Perineural   5 mg - 11/9/2022 8:08:00 AM  ropivacaine (NAROPIN) injection 0.5% - Perineural   20 mL - 11/9/2022 8:08:00 AM

## 2022-11-09 NOTE — ANESTHESIA POSTPROCEDURE EVALUATION
Department of Anesthesiology  Postprocedure Note    Patient: Sophia Solis  MRN: [id number]  YOB: 1990  Date of evaluation: 11/9/2022      Procedure Summary     Date: 11/09/22 Room / Location: [facility] MAIN OR 08 / [facility] MAIN OR    Anesthesia Start: 1432 Anesthesia Stop: 5675    Procedure: ANKLE OPEN REDUCTION INTERNAL FIXATION (Left: Ankle) Diagnosis:       Ankle fracture, bimalleolar, closed, left, initial encounter      (Ankle fracture, bimalleolar, closed, left, initial encounter [H91.452X])    Providers: Kelsey Galarza MD Responsible Provider: Socrates Estrada MD    Anesthesia Type: TIVA ASA Status: 2          Anesthesia Type: No value filed.     Gonzalo Phase I: Gonzalo Score: 6    Gonzalo Phase II:        Anesthesia Post Evaluation    Patient location during evaluation: PACU  Patient participation: complete - patient participated  Level of consciousness: awake and alert  Airway patency: patent  Nausea & Vomiting: no nausea  Cardiovascular status: hemodynamically stable  Respiratory status: acceptable  Hydration status: euvolemic

## 2022-11-09 NOTE — INTERVAL H&P NOTE
Update History & Physical    The Patient's History and Physical of 11/8/2022 was reviewed with the patient and I examined the patient. There was no change. The surgical site was confirmed by the patient and me. Plan:  The risk, benefits, expected outcome, and alternative to the recommended procedure have been discussed with the patient. Patient understands and wants to proceed with open reduction internal fixation of left ankle fracture and removal of external fixator left ankle.     Electronically signed by Helen Wade MD on 11/9/2022 at 6:50 AM

## 2022-11-09 NOTE — ANESTHESIA PROCEDURE NOTES
Administered  dexamethasone 4 MG/ML - Perineural   5 mg - 11/9/2022 8:00:00 AM  ropivacaine (NAROPIN) injection 0.5% - Perineural   30 mL - 11/9/2022 8:00:00 AM

## 2022-11-09 NOTE — ANESTHESIA PROCEDURE NOTES
Peripheral Block    Patient location during procedure: PACU  Reason for block: post-op pain management and at surgeon's request  Start time: 11/9/2022 12:14 PM  End time: 11/9/2022 12:16 PM  Staffing  Performed: anesthesiologist   Anesthesiologist: Steffany Felix MD  Preanesthetic Checklist  Completed: patient identified, IV checked, site marked, risks and benefits discussed, surgical/procedural consents, equipment checked, pre-op evaluation, timeout performed, anesthesia consent given, oxygen available and monitors applied/VS acknowledged  Peripheral Block   Patient position: supine  Prep: ChloraPrep  Provider prep: mask  Patient monitoring: cardiac monitor, continuous pulse ox, frequent blood pressure checks, IV access, oxygen and responsive to questions  Block type: Femoral  Adductor canal  Laterality: left  Injection technique: single-shot  Guidance: ultrasound guided  Local infiltration: decadron  Infiltration strength: 1 %  Local infiltration: decadron  Dose: 0.5 mL    Needle   Needle type: insulated echogenic nerve stimulator needle   Needle gauge: 20 G  Needle localization: ultrasound guidance  Assessment   Injection assessment: negative aspiration for heme, no paresthesia on injection, local visualized surrounding nerve on ultrasound and no intravascular symptoms  Slow fractionated injection: yes  Hemodynamics: stable  Real-time US image taken/store: yes  Outcomes: uncomplicated    Medications Administered  bupivacaine-EPINEPHrine PF 0.25% -1:200000 - Perineural   5 mL - 11/9/2022 12:14:00 PM  bupivacaine-EPINEPHrine PF 0.5% -1:058049 - Perineural   5 mL - 11/9/2022 12:14:00 PM

## 2022-11-09 NOTE — ANESTHESIA PROCEDURE NOTES
Peripheral Block    Patient location during procedure: post-op  Reason for block: post-op pain management and at surgeon's request  Start time: 11/9/2022 12:10 PM  End time: 11/9/2022 12:14 PM  Staffing  Performed: anesthesiologist   Anesthesiologist: Halle Blanco MD  Preanesthetic Checklist  Completed: patient identified, IV checked, site marked and risks and benefits discussed  Peripheral Block   Prep: ChloraPrep  Provider prep: mask  Patient monitoring: cardiac monitor, continuous pulse ox, frequent blood pressure checks, IV access, oxygen and responsive to questions  Block type: Sciatic  Popliteal  Laterality: left  Injection technique: single-shot  Guidance: nerve stimulator and ultrasound guided    Needle   Needle type: insulated echogenic nerve stimulator needle   Needle gauge: 21 G  Needle localization: nerve stimulator, ultrasound guidance and anatomical landmarks  Test dose: negative  Needle length: 4. Assessment   Injection assessment: negative aspiration for heme, no paresthesia on injection, local visualized surrounding nerve on ultrasound and no intravascular symptoms  Slow fractionated injection: yes  Hemodynamics: stable  Real-time US image taken/store: yes  Outcomes: uncomplicated    Additional Notes  Pt had 6/10 pain; most of it medial.  Discussed R/B with patient and family including nerve injury if reblock attempted. Family/ pt wants to proceed. Easy block. Nerve stim visualized at . 54 mAmps. Pt comfortable.   Entry site above previous  Medications Administered  bupivacaine-EPINEPHrine PF 0.25% -1:812538 - Perineural   7.5 mL - 11/9/2022 12:10:00 PM  bupivacaine-EPINEPHrine PF 0.5% -1:084074 - Perineural   7.5 mL - 11/9/2022 12:10:00 PM  lidocaine 2 % - Perineural   5 mL - 11/9/2022 12:10:00 PM

## 2022-11-15 ENCOUNTER — TELEPHONE (OUTPATIENT)
Dept: ORTHOPEDIC SURGERY | Age: 32
End: 2022-11-15

## 2022-11-15 NOTE — TELEPHONE ENCOUNTER
Spoke with patient. Decided to keep her appointment due to time sensitive staple/suture removal  and it being Thanksgiving weekend. Her staples do not need too remain in longer. She will attempt to rescheduled conflicting appointment instead.

## 2022-11-15 NOTE — TELEPHONE ENCOUNTER
Pt is schedule to come in on 11/22/22 she wont be able to make it she has another appt please call her so she can reschedule [phone]

## 2022-11-18 ENCOUNTER — OFFICE VISIT (OUTPATIENT)
Dept: FAMILY MEDICINE CLINIC | Facility: CLINIC | Age: 32
End: 2022-11-18
Payer: COMMERCIAL

## 2022-11-18 VITALS
OXYGEN SATURATION: 98 % | RESPIRATION RATE: 16 BRPM | DIASTOLIC BLOOD PRESSURE: 97 MMHG | TEMPERATURE: 97.2 F | HEART RATE: 85 BPM | SYSTOLIC BLOOD PRESSURE: 144 MMHG

## 2022-11-18 DIAGNOSIS — R68.89 FLU-LIKE SYMPTOMS: ICD-10-CM

## 2022-11-18 DIAGNOSIS — J10.1 INFLUENZA A: Primary | ICD-10-CM

## 2022-11-18 DIAGNOSIS — M54.50 LOW BACK PAIN, UNSPECIFIED BACK PAIN LATERALITY, UNSPECIFIED CHRONICITY, UNSPECIFIED WHETHER SCIATICA PRESENT: ICD-10-CM

## 2022-11-18 DIAGNOSIS — F33.42 RECURRENT MAJOR DEPRESSIVE DISORDER, IN FULL REMISSION (HCC): ICD-10-CM

## 2022-11-18 DIAGNOSIS — O10.012 CHRONIC BENIGN ESSENTIAL HYPERTENSION IN SECOND TRIMESTER: ICD-10-CM

## 2022-11-18 LAB
BILIRUBIN, URINE, POC: NEGATIVE
BLOOD URINE, POC: NEGATIVE
GLUCOSE URINE, POC: NEGATIVE
INFLUENZA A ANTIGEN, POC: POSITIVE
INFLUENZA B ANTIGEN, POC: NEGATIVE
KETONES, URINE, POC: NEGATIVE
LEUKOCYTE ESTERASE, URINE, POC: NEGATIVE
NITRITE, URINE, POC: NEGATIVE
PH, URINE, POC: 5 (ref 4.6–8)
PROTEIN,URINE, POC: NORMAL
SPECIFIC GRAVITY, URINE, POC: 1.01 (ref 1–1.03)
URINALYSIS CLARITY, POC: NORMAL
URINALYSIS COLOR, POC: YELLOW
UROBILINOGEN, POC: NORMAL
VALID INTERNAL CONTROL, POC: YES

## 2022-11-18 PROCEDURE — 87804 INFLUENZA ASSAY W/OPTIC: CPT | Performed by: FAMILY MEDICINE

## 2022-11-18 PROCEDURE — 81003 URINALYSIS AUTO W/O SCOPE: CPT | Performed by: FAMILY MEDICINE

## 2022-11-18 PROCEDURE — 99214 OFFICE O/P EST MOD 30 MIN: CPT | Performed by: FAMILY MEDICINE

## 2022-11-18 RX ORDER — OSELTAMIVIR PHOSPHATE 75 MG/1
75 CAPSULE ORAL 2 TIMES DAILY
Qty: 10 CAPSULE | Refills: 0 | Status: SHIPPED | OUTPATIENT
Start: 2022-11-18 | End: 2022-11-23

## 2022-11-18 RX ORDER — NADOLOL 20 MG/1
TABLET ORAL
Qty: 90 TABLET | Refills: 1 | Status: SHIPPED | OUTPATIENT
Start: 2022-11-18

## 2022-11-18 RX ORDER — AMOXICILLIN 500 MG/1
CAPSULE ORAL
COMMUNITY
Start: 2022-11-01

## 2022-11-18 RX ORDER — BUPROPION HYDROCHLORIDE 150 MG/1
150 TABLET, EXTENDED RELEASE ORAL 2 TIMES DAILY
Qty: 60 TABLET | Refills: 5 | Status: SHIPPED | OUTPATIENT
Start: 2022-11-18

## 2022-11-18 ASSESSMENT — ENCOUNTER SYMPTOMS
VOMITING: 0
SHORTNESS OF BREATH: 0
DIARRHEA: 0
NAUSEA: 0
SINUS PRESSURE: 1
SORE THROAT: 0
SWOLLEN GLANDS: 0
RHINORRHEA: 1
ABDOMINAL PAIN: 0
WHEEZING: 0
CONSTIPATION: 0
SINUS PAIN: 0
COUGH: 1

## 2022-11-18 ASSESSMENT — PATIENT HEALTH QUESTIONNAIRE - PHQ9
10. IF YOU CHECKED OFF ANY PROBLEMS, HOW DIFFICULT HAVE THESE PROBLEMS MADE IT FOR YOU TO DO YOUR WORK, TAKE CARE OF THINGS AT HOME, OR GET ALONG WITH OTHER PEOPLE: 0
7. TROUBLE CONCENTRATING ON THINGS, SUCH AS READING THE NEWSPAPER OR WATCHING TELEVISION: 0
6. FEELING BAD ABOUT YOURSELF - OR THAT YOU ARE A FAILURE OR HAVE LET YOURSELF OR YOUR FAMILY DOWN: 0
SUM OF ALL RESPONSES TO PHQ QUESTIONS 1-9: 0
3. TROUBLE FALLING OR STAYING ASLEEP: 0
SUM OF ALL RESPONSES TO PHQ9 QUESTIONS 1 & 2: 0
4. FEELING TIRED OR HAVING LITTLE ENERGY: 0
1. LITTLE INTEREST OR PLEASURE IN DOING THINGS: 0
2. FEELING DOWN, DEPRESSED OR HOPELESS: 0
SUM OF ALL RESPONSES TO PHQ QUESTIONS 1-9: 0
8. MOVING OR SPEAKING SO SLOWLY THAT OTHER PEOPLE COULD HAVE NOTICED. OR THE OPPOSITE, BEING SO FIGETY OR RESTLESS THAT YOU HAVE BEEN MOVING AROUND A LOT MORE THAN USUAL: 0
SUM OF ALL RESPONSES TO PHQ QUESTIONS 1-9: 0
SUM OF ALL RESPONSES TO PHQ QUESTIONS 1-9: 0
5. POOR APPETITE OR OVEREATING: 0
9. THOUGHTS THAT YOU WOULD BE BETTER OFF DEAD, OR OF HURTING YOURSELF: 0

## 2022-11-18 NOTE — PROGRESS NOTES
HISTORY OF PRESENT ILLNESS  Chief Complaint   Patient presents with    Cough     Pt children have flu     Congestion    Generalized Body Aches    Lower Back Pain     Pt children have flu   Started feeling bad yesterday around 3 am.    Sound in one of her ears that she cannot get to go away. Had a big oral surgery recently. URI   This is a new problem. The current episode started yesterday. The problem has been gradually worsening. The maximum temperature recorded prior to her arrival was 101 - 101.9 F. Associated symptoms include congestion, coughing and rhinorrhea. Pertinent negatives include no abdominal pain, chest pain, diarrhea, dysuria, ear pain, headaches, joint pain, joint swelling, nausea, neck pain, plugged ear sensation, rash, sinus pain, sneezing, sore throat, swollen glands, vomiting or wheezing. She has tried acetaminophen for the symptoms. The treatment provided mild relief. Review of Systems   Constitutional:  Positive for fatigue and fever. Negative for activity change, appetite change, chills and diaphoresis. HENT:  Positive for congestion, rhinorrhea and sinus pressure. Negative for ear pain, sinus pain, sneezing and sore throat. Respiratory:  Positive for cough. Negative for shortness of breath and wheezing. Cardiovascular:  Negative for chest pain. Gastrointestinal:  Negative for abdominal pain, constipation, diarrhea, nausea and vomiting. Genitourinary:  Negative for dysuria. Musculoskeletal:  Positive for arthralgias and myalgias. Negative for joint pain and neck pain. Skin:  Negative for rash. Neurological:  Negative for dizziness and headaches. Psychiatric/Behavioral:  Negative for dysphoric mood. The patient is not nervous/anxious.        Patient Active Problem List   Diagnosis    History of cholestasis during pregnancy    High-risk pregnancy in second trimester    Obesity affecting pregnancy in second trimester    Preeclampsia    History of  delivery, currently pregnant in second trimester    Pre-eclampsia    BMI 36.0-36.9,adult     delivery delivered    Previous  section complicating pregnancy    Depression affecting pregnancy in second trimester, antepartum    Chronic benign essential hypertension in second trimester    History of twin pregnancy in prior pregnancy    Anemia in pregnancy    Ankle fracture, bimalleolar, closed, left, initial encounter         Blood pressure (!) 137/97, pulse 85, temperature 97.2 °F (36.2 °C), temperature source Temporal, resp. rate 16, last menstrual period 10/18/2022, SpO2 98 %. Pain Scale: 6/10 Pain Location: Head  There is no height or weight on file to calculate BMI. Physical Exam  Vitals and nursing note reviewed. Constitutional:       General: She is not in acute distress. Appearance: Normal appearance. She is normal weight. She is not toxic-appearing. Comments: In wheelchair with left foot bandaged with ace wrap. HENT:      Head: Normocephalic and atraumatic. Right Ear: Tympanic membrane, ear canal and external ear normal.      Left Ear: Tympanic membrane, ear canal and external ear normal.      Nose: Congestion and rhinorrhea present. Right Turbinates: Swollen. Left Turbinates: Swollen. Mouth/Throat:      Mouth: Mucous membranes are moist. No injury or oral lesions. Tongue: No lesions. Pharynx: Oropharynx is clear. Uvula midline. No pharyngeal swelling, oropharyngeal exudate or posterior oropharyngeal erythema. Eyes:      General: Lids are normal.      Extraocular Movements: Extraocular movements intact. Conjunctiva/sclera: Conjunctivae normal.      Pupils: Pupils are equal.   Cardiovascular:      Rate and Rhythm: Normal rate and regular rhythm. Heart sounds: Normal heart sounds. No murmur heard. No friction rub. No gallop. Pulmonary:      Effort: Pulmonary effort is normal. No respiratory distress.       Breath sounds: Normal breath sounds. No wheezing or rales. Skin:     General: Skin is warm and dry. Neurological:      Mental Status: She is alert. Psychiatric:         Mood and Affect: Mood normal.         Behavior: Behavior normal.         Thought Content: Thought content normal.         Judgment: Judgment normal.            Results for orders placed or performed in visit on 11/18/22   AMB POC URINALYSIS DIP STICK MANUAL W/O MICRO   Result Value Ref Range    Color (UA POC) Yellow     Clarity (UA POC) Cloudy     Glucose, Urine, POC Negative Negative    Bilirubin, Urine, POC Negative Negative    Ketones, Urine, POC Negative Negative    Specific Gravity, Urine, POC 1.015 1.001 - 1.035    Blood (UA POC) Negative Negative    pH, Urine, POC 5.0 4.6 - 8.0    Protein, Urine, POC Trace Negative    Urobilinogen, POC 0.2 mg/dL     Nitrite, Urine, POC Negative Negative    Leukocyte Esterase, Urine, POC Negative Negative   AMB POC RAPID INFLUENZA TEST   Result Value Ref Range    Valid Internal Control, POC yes     Influenza A Antigen, POC Positive Negative    Influenza B Antigen, POC Negative Negative        ASSESSMENT and PLAN   Diagnosis Orders   1. Influenza A  oseltamivir (TAMIFLU) 75 MG capsule      2. Low back pain, unspecified back pain laterality, unspecified chronicity, unspecified whether sciatica present  AMB POC URINALYSIS DIP STICK MANUAL W/O MICRO      3. Flu-like symptoms  AMB POC RAPID INFLUENZA TEST      4. Chronic benign essential hypertension in second trimester  nadolol (CORGARD) 20 MG tablet      5. Recurrent major depressive disorder, in full remission ([facility]  buPROPion (WELLBUTRIN SR) 150 MG extended release tablet          Reviewed medications and side effects in detail    The patient's condition was discussed at length with the patient. The expected course and possible complications were reviewed. All questions were answered. Her other chronic conditions are stable at this time.   She is stable on the current treatment and tolerating it well. No significant sides effects. Will not adjust therapy at this time, unless noted above. We will continue to monitor for any problems. Medications refilled and lab work has been ordered where needed. Reviewed medications are explained including any potential interactions or side effects in detail. The patient's questions were answered. She  understands the above and has no further questions. Further workup and treatment should be done if symptoms persist, worsen or new symptoms occur. She  will call to notify us of any problems, complications or worsening symptoms. There are no Patient Instructions on file for this visit. (Some details in this note may have been created with speech-recognition software. Some errors in speech recognition may have occurred.    Most of those have been corrected but some may have been missed.)         Jemma Villafuerte MD  [address] [place] [address]  Phone [phone]

## 2022-11-22 ENCOUNTER — OFFICE VISIT (OUTPATIENT)
Dept: ORTHOPEDIC SURGERY | Age: 32
End: 2022-11-22

## 2022-11-22 DIAGNOSIS — S82.842A BIMALLEOLAR ANKLE FRACTURE, LEFT, CLOSED, INITIAL ENCOUNTER: Primary | ICD-10-CM

## 2022-11-22 DIAGNOSIS — Z09 HOSPITAL DISCHARGE FOLLOW-UP: ICD-10-CM

## 2022-11-22 PROCEDURE — 99024 POSTOP FOLLOW-UP VISIT: CPT | Performed by: ORTHOPAEDIC SURGERY

## 2022-11-22 RX ORDER — AMOXICILLIN AND CLAVULANATE POTASSIUM 875; 125 MG/1; MG/1
1 TABLET, FILM COATED ORAL 2 TIMES DAILY
Qty: 14 TABLET | Refills: 0 | Status: SHIPPED | OUTPATIENT
Start: 2022-11-22 | End: 2022-11-29

## 2022-11-22 NOTE — PROGRESS NOTES
Progress Note    Patient: Joellen Zuniga MRN: [id number]  SSN: [id number]    YOB: 1990  Age: 33 y.o. Sex: female        11/22/2022      Subjective:     Patient is now about 2 weeks out from removal of external fixator and ORIF of a left bimalleolar ankle fracture. He seems to be doing pretty well    Objective: There were no vitals filed for this visit. Physical Exam:     Skin - incision is well healed with no redness or drainage and her pin sites do show some bloody drainage  Motor and sensory function intact in LEFT LOWER extremity  Pulses palpable in LEFT LOWER extremity     XRAY FINDINGS:  X-rays today    Assessment:     2 weeks out from open reduction internal fixation of left bimalleolar ankle fracture    Plan:     I think it would give her a little bit of oral antibiotics just because her most distal pin sites through her calcaneus show little bit of maceration around these and just a little bit of erythema so I think it would be a good idea to do this. Her incisions themselves look great. So jl hernandez give her a weeks worth of antibiotics.   I told her that we are to put her in a boot I like her to do some weightbearing for transfers in the boot she does not need to wear the boot at night I like her to come out of this to do some range of motion exercises and to do some Achilles tendon stretching and I will see her back in about 3 weeks with AP and lateral left ankle return    Signed By: Pradeep Clements MD     November 22, 2022

## 2022-11-22 NOTE — PATIENT INSTRUCTIONS
[employer]         Introduction  Here are some examples of exercises for you to try. The exercises may be suggested for a condition or for rehabilitation. Start each exercise slowly. Ease off the exercises if you start to have pain. You will be told when to start these exercises and which ones will work best for you. How to do the exercises      Calf stretch (knee straight)   For this exercise, you will need a towel. Sit with your affected leg straight and supported on the floor. Your other leg should be bent, with that foot flat on the floor. Place a towel around your affected foot just under the toes. Hold one end of the towel in each hand, with your hands above your knees. Pull back gently with the towel so that your foot stretches toward you. Hold the position for at least 15 to 30 seconds. Repeat 2 to 4 times a session, up to 5 sessions a day. Calf stretch (knee bent)   For this exercise, you will need a towel. You will also need a pillow or foam roll. Sit with your affected leg straight and supported on the floor. Your other leg should be bent, with that foot flat on the floor. Place a pillow or foam roll under your affected leg. Place a towel around your affected foot just under the toes. Hold one end of the towel in each hand, with your hands above your knees. Pull back gently with the towel so that your foot stretches toward you. Hold the position for at least 15 to 30 seconds. Repeat 2 to 4 times a session, up to 5 sessions a day. Ankle plantar flexion   Sit with your affected leg straight and supported on the floor. Your other leg should be bent, with that foot flat on the floor. Keeping your affected leg straight, gently flex your foot downward so your toes are pointed away from your body. Then slowly relax your foot to the starting position. Repeat 8 to 12 times. Ankle dorsiflexion   Sit with your affected leg straight and supported on the floor.  Your other leg should be bent, with that foot flat on the floor. Keeping your affected leg straight, gently flex your foot back toward your body so your toes point upward. Then slowly relax your foot to the starting position. Repeat 8 to 12 times. Ankle alphabet   Sit in a chair with your feet flat on the floor. (You can also do this exercise lying on your back with your affected leg propped up on a pillow). Lift the heel of your affected foot off the floor, and slowly trace the letters of the alphabet. Heel raises   Stand with your feet a few inches apart, with your hands lightly resting on a counter or chair in front of you. Slowly raise your heels off the floor while keeping your knees straight. Hold for about 6 seconds, then slowly lower your heels to the floor. Do 8 to 12 repetitions several times during the day. Follow-up care is a key part of your treatment and safety. Be sure to make and go to all appointments, and call your doctor if you are having problems. It's also a good idea to know your test results and keep a list of the medicines you take. Where can you learn more? Go to http://www.gray.com/  Enter T800 in the search box to learn more about \"Ankle Fracture: Rehab Exercises. \"  Current as of: March 2, 2020               Content Version: 12.6  © [id number] [employer]. Care instructions adapted under license by [employer] (which disclaims liability or warranty for this information). If you have questions about a medical condition or this instruction, always ask your healthcare professional. [place] [address] any warranty or liability for your use of this information.

## 2022-11-22 NOTE — LETTER
DME Patient Authorization Form    Name: Robert Wing  : 1990  MRN: [id number]   Age: 33 y.o. Gender: female  Delivery Address: [facility]     Diagnosis:     ICD-10-CM    1. Bimalleolar ankle fracture, left, closed, initial encounter  S82.842A NM PNEUMAT WALKING BOOT PRE CST      2. Hospital discharge follow-up  Z09            Requested DME:  Rj Ping -  ($290.00) X 1 - left        Clinical Notes:     **Indicates non-covered items by insurance. Payment expected on date of service. Electronically signed by  Provider: Liat Moon MD__Date: 2022                            [address] Tax ID # [id number]        Durable Medical Equipment and/or Orthotics Patient Consent     I understand that my physician has prescribed this medical supply as part of my treatment plan as a matter of Medical Necessity.  I understand that I have a choice in where I receive my prescribed orthopedic supplies and/or services.  I authorize [facility] to furnish this service/product and to provide my insurance carrier with any information requested in order to process for payment.  I instruct my insurance carrier to pay [facility] directly for these services/products.  I understand that my insurance carrier may deny payment for this supply because it is a non-covered item, deemed not medically necessary or considered experimental.   I understand that any cost not covered by my insurance carrier will be solely my financial responsibility.  I have received the Supplier Standards and have reviewed them.  I have received the prescribed item and have been fully instructed on the proper use of the above services/products.    ______ (Patient Initials) I understand that all DME items are non-returnable after being dispensed.  Items still in sealed packaging may be returned up to 14 days after purchasing. [address] will replace items that are defective.    ______ (Patient Initials) I understand that [employer] will not file a claim with my insurance carrier for this service/product and I am waiving my right to file a claim on my own for this service/product with my insurance company as this item is NON-COVERED (Denoted by the **) by my Insurance company/policy. ______ (Patient Initials) I understand that I am responsible to bring my equipment to the hospital for any surgery. ______________________________________________  ________________________  Patient / Joie Dennison            Thank you for considering [address] Your physician has prescribed specific medical equipment or devices for your home use. The following describes your rights and responsibilities as our customer. Right to Choose Providers: You have a choice regarding which company supplies your home medical equipment and devices, and to consult your physician in this decision. You may choose a medical supply store, a home medical equipment provider, or a specialist such as [facility]. [facility] will coordinate with your physician to provide the medical equipment or devices prescribed for your home use. Right to Service:  You have the right to considerate, respectful and nondiscriminatory care. You have the right to receive accurate and easily understood information about your health care. If you speak a foreign language, or don't understand the discussions, assistance will be provided to allow you to make informed health care decisions. You have the right to know your treatment options and to participate in decisions about your care, including the right to accept or refuse treatment.   You have the right to expect a reasonable response to your requests for treatment or service. You have the right to talk in confidence with health care providers and to have your health care information protected. You have the right to receive an explanation of your bill. You have the right to complain about the service or product you receive. Patient Responsibilities:  Please provide complete and accurate information about your health insurance benefits and make arrangements for the timely payment of your bill. [facility] will, if possible, assume responsibility for billing your insurance (Medicare, Medicaid and commercial) for the prescribed equipment or devices. If your policy does not cover the prescribed product, or only covers a portion of the bill, you are responsible for any remaining balance. Return and Exchange Policy:  [facility] will honor published  Warranties for products. [facility] will accept returns or exchanges within 14 days from the date of receipt, providin) the product must be in new condition; 2) receipt as required; and 3) used disposable and hygiene products may only be returned due to a defective product. Note: Refunds will be issued in a timely manner, please allow 4-6 weeks for processing. Complaint Procedures and DME Consumer Protection Resources:  [facility] values you as a customer, and is committed to resolving patient concerns. This commitment includes understanding and documenting your concerns, conducting a review of internal procedures, and providing you with an explanation and resolution to your concerns. Should you have any questions about our services or billing process, please contact our office at (practice phone number). If we are unable to resolve the concern, you have the right to direct comments to the office of Consumer Protection, in the [address] or the [facility] office, without fear of repercussion.     DMEPOS SUPPLIER STANDARDS    A supplier must be in compliance with all applicable Federal and State licensure and regulatory requirements. A supplier must provide complete and accurate information on the DMEPOS supplier application. Any changes to this information must be reported to the [facility] within 30 days. An authorized individual (one whose signature is binding) must sign the application for billing privileges. A supplier must fill orders from its own inventory, or must contract with other companies for the purchase of items necessary to fill the order. A supplier may not contract with any entity that is currently excluded from the Medicare program, any [facility] program, or from any other Federal procurement or Nonprocurement programs. A supplier must advise beneficiaries that they may rent or purchase inexpensive or routinely purchased durable medical equipment, and of the purchase option for capped rental equipment. A supplier must notify beneficiaries of warranty coverage and honor all warranties under applicable State Law, and repair or replace free of charge Medicare covered items that are under warranty. A supplier must maintain a physical facility on an appropriate site. A supplier must permit CMS, or its agents to conduct on-site inspections to ascertain the supplier's compliance with these standards. The supplier location must be accessible to beneficiaries during reasonable business hours, and must maintain a visible sign and posted hours of operation. A supplier must maintain a primary business telephone listed under the name of the business in a [employer] or a toll free number available through directory assistance. The exclusive use of a beeper, answering machine or cell phone is prohibited. A supplier must have comprehensive liability insurance in the amount of at least $300,000 that covers both the supplier's place of business and all customers and employees of the supplier.   If the supplier manufactures its own items, this insurance must also cover product liability and completed operations. A supplier must agree not to initiate telephone contact with beneficiaries, with a few exceptions allowed. This standard prohibits suppliers from calling beneficiaries in order to solicit new business. A supplier is responsible for delivery and must instruct beneficiaries on use of Medicare covered items, and maintain proof of delivery. A supplier must answer questions, and respond to complaints of the beneficiaries, and maintain documentation of such contacts. A supplier must maintain and replace at no charge or repair directly, or through a service contract with another company, Medicare covered items it has rented to beneficiaries. A supplier must accept returns of substandard (less than full quality for the particular item) or unsuitable items (inappropriate for the beneficiary at the time it was fitted and rented or sold) from beneficiaries. A supplier must disclose these supplier standards to each beneficiary to whom it supplies a Medicare-covered item. A supplier must disclose to the government any person having ownership, financial, or control interest in the supplier. A supplier must not convey or reassign a supplier number; i.e., the supplier may not sell or allow another entity to use its Medicare billing number. A supplier must have a complaint resolution protocol established to address beneficiary complaints that relate to these standards. A record of these complaints must be maintained at the physical facility. Complaint records must include: the name, address, telephone number and health insurance claim number of the beneficiary, a summary of the complaint, and any action taken to resolve it. A supplier must agree to furnish CMS any information required by the Medicare statute and implementing regulations.   A supplier of DMEPOS and other items and services must be accredited by a CMS-approved accreditation organization in order to receive and retain a supplier billing number. The accreditation must indicate the specific products and services, for which the supplier is accredited in order for the supplier to receive payment for those specific products and services. A DMEPOS supplier must notify their accreditation organization when a new DMEPOS location is opened. The accreditation organization may accredit the new supplier location for three months after it is operational without requiring a new site visit. All DMEPOS supplier locations, whether owned or subcontracted, must meet the [employer] and be separately accredited in order to bill Medicare. An accredited supplier may be denied enrollment or their enrollment may be revoked, if CMS determines that they are not in compliance with the DMEPOS quality standards. A DMEPOS supplier must disclose upon enrollment all products and services, including the addition of new product lines for which they are seeking accreditation. If a new product line is added after enrollment, the DMEPOS supplier will be responsible for notifying the accrediting body of the new product so that the DMEPOS supplier can be re-surveyed and accredited for these new products. Must meet the surety bond requirements specified in 42 C. F.R. 424.57(c). Implementation date- May 4, 2009. A supplier must obtain oxygen from a state-licensed oxygen supplier. A supplier must maintain ordering and referring documentation consistent with provisions found in 42 C. F.R. 424.516(f). DMEPOS suppliers are prohibited from sharing a practice location with certain other Medicare providers and suppliers. DMEPOS suppliers must remain open to the public for a minimum of 30 hours per week with certain exceptions.

## 2022-11-26 DIAGNOSIS — I10 ESSENTIAL HYPERTENSION: ICD-10-CM

## 2022-11-28 RX ORDER — CLONIDINE 0.1 MG/24H
PATCH, EXTENDED RELEASE TRANSDERMAL
Qty: 12 PATCH | Refills: 1 | Status: SHIPPED | OUTPATIENT
Start: 2022-11-28

## 2022-12-13 ENCOUNTER — OFFICE VISIT (OUTPATIENT)
Dept: ORTHOPEDIC SURGERY | Age: 32
End: 2022-12-13

## 2022-12-13 DIAGNOSIS — S82.842A BIMALLEOLAR ANKLE FRACTURE, LEFT, CLOSED, INITIAL ENCOUNTER: Primary | ICD-10-CM

## 2022-12-13 PROCEDURE — 99024 POSTOP FOLLOW-UP VISIT: CPT | Performed by: ORTHOPAEDIC SURGERY

## 2022-12-13 NOTE — PROGRESS NOTES
Progress Note    Patient: Ari Diggs MRN: [id number]  SSN: [id number]    YOB: 1990  Age: 33 y.o. Sex: female        12/13/2022      Subjective:     Patient is now almost 5 weeks out from open treatment of a left bimalleolar ankle fracture. She had a really significant soft tissue injury with a lot of soft tissue swelling but she is really doing remarkably well. She is able to walk in her boot. She seems to be very stoic    Objective: There were no vitals filed for this visit. Physical Exam:     Skin - incision is well healed with no redness or drainage  Motor and sensory function intact in LEFT LOWER extremity  Pulses palpable in LEFT LOWER extremity     XRAY FINDINGS:  [id number] left bimalleolar ankle fracture, findings-AP and lateral views of the left ankle shows that the medial and lateral malleolar both appear to be healing and healing in very reasonable position. The talus is very well reduced underneath the ankle mortise the hardware is intact with no evidence of loosening or failure impression-healing well aligned left bimalleolar ankle fracture    Assessment:     Healing left bimalleolar ankle fracture    Plan:     I am very pleased the way she is doing today clinically I do think she has a good bit of stiffness and she may benefit from some outpatient physical therapy so we can try to get that set up for her. She did have this rather extensive soft tissue injury so it makes sense that she would have a lot of trouble with stiffness so we will get her started with some outpatient therapy for full active and aggressive passive range of motion and full strengthening left ankle. She can be weightbearing as tolerated. I like her to have the boot on when she is up and weightbearing for at least the next 3 weeks and after that she can wean herself out of it. She does not need to wear this at night.   I will see her back in 6 weeks with 3 views left ankle on return she will be about 3 months out at that time    Signed By: Juhi Cavazos MD     December 13, 2022

## 2023-02-02 ENCOUNTER — OFFICE VISIT (OUTPATIENT)
Dept: NEUROLOGY | Age: 33
End: 2023-02-02
Payer: COMMERCIAL

## 2023-02-02 DIAGNOSIS — G43.709 CHRONIC MIGRAINE WITHOUT AURA WITHOUT STATUS MIGRAINOSUS, NOT INTRACTABLE: Primary | ICD-10-CM

## 2023-02-02 DIAGNOSIS — G37.9 DEMYELINATING DISEASE (HCC): ICD-10-CM

## 2023-02-02 PROCEDURE — 99204 OFFICE O/P NEW MOD 45 MIN: CPT | Performed by: PSYCHIATRY & NEUROLOGY

## 2023-02-02 ASSESSMENT — ENCOUNTER SYMPTOMS
ALLERGIC/IMMUNOLOGIC NEGATIVE: 1
GASTROINTESTINAL NEGATIVE: 1
EYES NEGATIVE: 1
RESPIRATORY NEGATIVE: 1

## 2023-02-02 NOTE — PROGRESS NOTES
[address], [address] [facility]  Phone: [phone] Fax [phone]  Dr. Kaci Luna      2/2/2023  [address] [address]     Patient is referred by the following provider for consultation regarding as below:       I reviewed the available records and notes and have examined patient with the following findings:     Chief Complaint:  No chief complaint on file. HPI: This is a right handed 33 y.o.  female who is here alone today. The patient states that she started having this problem these headaches/face pain in high school. At that time they were present just like they are now but for a long time they were not very bad up until about 5 years ago and they seem to escalate. She gets 1 of these episodes every 2 months. It can last anywhere from a day to a month straight in fact the last one was a month straight she ended up taking oxycodone to break it. That after she tried everything the emergency room's and primary doctor tried. The patient describes these episodes as if it starts with her chin. I know it is odd and different but either side of her chin will start becoming uncomfortable and hard to describe it. Its not electrical sharp shooting or lancinating like neuralgia and it can be on the right shin or the left shin stable at the use. Its not teeth problems she went to dentist and all that and they have just done everything and cannot identify. She states she started getting the shin thing pain that is a deep achy type of pain. And it kind of radiates up over her face. There is a lot of pressure photophobia phonophobia but no nausea and vomiting. And what ever side of the head is is the side that she gets uncomfortable with.   Her last episode was couple months ago and it lasted and month straight she got emergency room room doctor they tried steroids I tried everything she eventually went on oxycodone for about 2 or 3 days and then if they broke and she was discontinued it. Sometimes it feels like a nail is being jammed into her ear. But her facial changes her pain is not burning electrical or neuralgia like. They are more deep achy. In the past she has failed on Imitrex Relpax Maxalt steroids over-the-counter medication Percocet/oxycodone and Toradol. She is also on a beta-blocker that did not help. She had an MRI of her brain done in 2018 which I do not think is nearly as recent as it should be CTA of her head neck in 2018 which was negative. She has a history of hypertension to and had difficult pregnancy. IMAGING REVIEW:  I REVIEWED PERTINENT  IMAGES AND REPORTS WITH THE PATIENT PERSONALLY, DIRECTLY AND FULLY.      Past Medical History:  Past Medical History:   Diagnosis Date    ADHD     Depression 2016    Hypertension     controlled with med    Left ankle pain     Migraine without aura and without status migrainosus, not intractable     Polycystic disease, ovaries     PONV (postoperative nausea and vomiting)     with c- sect     Preeclampsia 2021     delivery        Past Surgical History:  Past Surgical History:   Procedure Laterality Date    ANKLE FRACTURE SURGERY Left 10/26/2022    LEFT ANKLE EXTERNAL FIXATOR performed by Rio Mann MD at [address] Left 2022    ANKLE OPEN REDUCTION INTERNAL FIXATION performed by Rio Mann MD at [facility]      c section x 2--  and     MOUTH SURGERY  2022    oral surg--       Social History:  Social History     Socioeconomic History    Marital status:      Spouse name: Not on file    Number of children: Not on file    Years of education: Not on file    Highest education level: Not on file   Occupational History    Not on file   Tobacco Use    Smoking status: Never    Smokeless tobacco: Never   Vaping Use    Vaping Use: Never used   Substance and Sexual Activity    Alcohol use: No    Drug use: No    Sexual activity: Not on file   Other Topics Concern    Not on file   Social History Narrative    Not on file     Social Determinants of Health     Financial Resource Strain: Not on file   Food Insecurity: Not on file   Transportation Needs: Not on file   Physical Activity: Not on file   Stress: Not on file   Social Connections: Not on file   Intimate Partner Violence: Not on file   Housing Stability: Not on file       Family History:   Family History   Problem Relation Age of Onset    Hypertension Maternal Grandmother     Heart Disease Maternal Grandmother     Hypertension Maternal Grandfather     Heart Disease Maternal Grandfather     No Known Problems Father     Hypertension Mother        Current Outpatient Medications on File Prior to Visit   Medication Sig Dispense Refill    cloNIDine (CATAPRES) 0.1 MG/24HR PTWK APPLY 1 PATCH ONCE A WEEK 12 patch 1    nadolol (CORGARD) 20 MG tablet TAKE 1 TABLET BY MOUTH AT BEDTIME FOR MIGRAINE PREVENTION 90 tablet 1    buPROPion (WELLBUTRIN SR) 150 MG extended release tablet Take 1 tablet by mouth 2 times daily 60 tablet 5    amoxicillin (AMOXIL) 500 MG capsule TAKE 1 CAPSULE BY MOUTH THREE TIMES A DAY      ibuprofen (ADVIL;MOTRIN) 800 MG tablet Take 800 mg by mouth every 8 hours as needed      SUMAtriptan (IMITREX) 100 MG tablet 1/2 - 1 po @@ onset of migraine, may repeat in 2h prn       No current facility-administered medications on file prior to visit. Allergies   Allergen Reactions    Cefaclor Other (See Comments)     Pt unsure of rx-- was as a child    Sulfa Antibiotics Other (See Comments)     Rx unknown -- was as a child       Review of Systems:  Review of Systems   Constitutional: Negative. HENT: Negative. Eyes: Negative. Respiratory: Negative. Cardiovascular: Negative. Gastrointestinal: Negative. Endocrine: Negative. Genitourinary: Negative. Musculoskeletal: Negative. Allergic/Immunologic: Negative.     Neurological:  Positive for headaches. Hematological: Negative. Psychiatric/Behavioral: Negative. No flowsheet data found. No flowsheet data found. There were no vitals filed for this visit. Physical Exam  Constitutional:       Appearance: Normal appearance. HENT:      Head: Normocephalic and atraumatic. Eyes:      Extraocular Movements: Extraocular movements intact and EOM normal.      Pupils: Pupils are equal, round, and reactive to light. Cardiovascular:      Rate and Rhythm: Normal rate and regular rhythm. Pulses: Normal pulses. Heart sounds: Normal heart sounds. Pulmonary:      Effort: Pulmonary effort is normal.   Abdominal:      General: Abdomen is flat. Neurological:      Mental Status: She is alert and oriented to person, place, and time. Gait: Gait is intact. Deep Tendon Reflexes:      Reflex Scores:       Tricep reflexes are 1+ on the right side and 1+ on the left side. Bicep reflexes are 1+ on the right side and 1+ on the left side. Brachioradialis reflexes are 1+ on the right side and 1+ on the left side. Patellar reflexes are 1+ on the right side and 1+ on the left side. Achilles reflexes are 1+ on the right side and 1+ on the left side. Neurologic Exam     Mental Status   Oriented to person, place, and time. Attention: normal. Concentration: normal.   Level of consciousness: alert  Knowledge: good. Cranial Nerves     CN II   Visual fields full to confrontation. CN III, IV, VI   Pupils are equal, round, and reactive to light. Extraocular motions are normal.     CN VII   Facial expression full, symmetric.      Motor Exam   Right arm tone: normal  Left arm tone: normal  Right leg tone: normal  Left leg tone: normal    Gait, Coordination, and Reflexes     Gait  Gait: normal    Tremor   Resting tremor: absent  Intention tremor: absent  Action tremor: absent    Reflexes   Right brachioradialis: 1+  Left brachioradialis: 1+  Right biceps: 1+  Left biceps: 1+  Right triceps: 1+  Left triceps: 1+  Right patellar: 1+  Left patellar: 1+  Right achilles: 1+  Left achilles: 1+        Assessment   Assessment / Plan:    Diagnoses and all orders for this visit:    Chronic migraine without aura without status migrainosus, not intractable at this time the patient really kind of describes these as headaches but there is also on the back of her mind this may be an atypical facial pain but it is not a neuralgia. The patient's has had this for very long time as mentioned above. States she has 1 every 2 months room to try to just use abortive medication and when she gets these we will get a probably have to get aggressive with them to try to break them. I say that because nothing seems to overworked and she has had these last for over a month. We are can give samples of Ubrelvy at this time she has not tried Nurtec. She is already failed Imitrex Relpax Maxalt over-the-counter medication steroids oxycodone and Toradol. Demyelinating disease ([address] her symptoms are so different and were strongly concerned of an atypical like facial pain were need to nikky forward and make sure the patient does not have underlying demyelinating disease/MS. -     MRI BRAIN W WO CONTRAST; Future        The Diagnosis and differential diagnostic considerations, and Rx Tx were reviewed with the patient at length. Orders Placed This Encounter   Procedures    MRI BRAIN W WO CONTRAST     Standing Status:   Future     Standing Expiration Date:   2/2/2024     Order Specific Question:   STAT Creatinine as needed:     Answer:   Yes          I have spent greater than 50% of visit discussing and counseling of patient  for treatment and diagnostic plan review. Total tim45 min      Notes: Patient is to continue all medications as directed by prescribing physicians. Continuations on today's visit are made based on the patient's report of current medications.              Dr. Dean Abdi [facility]  Consultation Neurology, Neurodiagnostics and Neurotherapeutics  Neuroelectrophysiology, EEG, EMG  [facility] Neurology  [facility] [place] [address], [address]   Phone:  [phone]  Fax:   [phone]

## 2023-07-12 RX ORDER — BUPROPION HYDROCHLORIDE 150 MG/1
150 TABLET, EXTENDED RELEASE ORAL 2 TIMES DAILY
Qty: 60 TABLET | Refills: 5 | Status: CANCELLED | OUTPATIENT
Start: 2023-07-12

## 2023-07-12 RX ORDER — NADOLOL 20 MG/1
TABLET ORAL
Qty: 90 TABLET | Refills: 1 | Status: CANCELLED | OUTPATIENT
Start: 2023-07-12

## 2023-07-13 ENCOUNTER — OFFICE VISIT (OUTPATIENT)
Dept: FAMILY MEDICINE CLINIC | Facility: CLINIC | Age: 33
End: 2023-07-13
Payer: COMMERCIAL

## 2023-07-13 VITALS
SYSTOLIC BLOOD PRESSURE: 132 MMHG | HEART RATE: 78 BPM | TEMPERATURE: 98.2 F | BODY MASS INDEX: 35.01 KG/M2 | HEIGHT: 68 IN | OXYGEN SATURATION: 98 % | WEIGHT: 231 LBS | DIASTOLIC BLOOD PRESSURE: 92 MMHG | RESPIRATION RATE: 16 BRPM

## 2023-07-13 DIAGNOSIS — F33.42 RECURRENT MAJOR DEPRESSIVE DISORDER, IN FULL REMISSION (HCC): ICD-10-CM

## 2023-07-13 DIAGNOSIS — D50.9 IRON DEFICIENCY ANEMIA, UNSPECIFIED IRON DEFICIENCY ANEMIA TYPE: ICD-10-CM

## 2023-07-13 DIAGNOSIS — O10.012 CHRONIC BENIGN ESSENTIAL HYPERTENSION IN SECOND TRIMESTER: ICD-10-CM

## 2023-07-13 DIAGNOSIS — E66.01 SEVERE OBESITY (BMI 35.0-39.9) WITH COMORBIDITY (HCC): ICD-10-CM

## 2023-07-13 DIAGNOSIS — Z13.31 POSITIVE DEPRESSION SCREENING: ICD-10-CM

## 2023-07-13 DIAGNOSIS — R63.5 WEIGHT GAIN: Primary | ICD-10-CM

## 2023-07-13 DIAGNOSIS — F90.9 ADULT ADHD (ATTENTION DEFICIT HYPERACTIVITY DISORDER): ICD-10-CM

## 2023-07-13 DIAGNOSIS — I10 ESSENTIAL HYPERTENSION: ICD-10-CM

## 2023-07-13 LAB
ALBUMIN SERPL-MCNC: 3.5 G/DL (ref 3.5–5)
ALBUMIN/GLOB SERPL: 0.9 (ref 0.4–1.6)
ALP SERPL-CCNC: 124 U/L (ref 50–136)
ALT SERPL-CCNC: 21 U/L (ref 12–65)
ANION GAP SERPL CALC-SCNC: 3 MMOL/L (ref 2–11)
AST SERPL-CCNC: 13 U/L (ref 15–37)
BASOPHILS # BLD: 0.1 K/UL (ref 0–0.2)
BASOPHILS NFR BLD: 1 % (ref 0–2)
BILIRUB SERPL-MCNC: 0.3 MG/DL (ref 0.2–1.1)
BUN SERPL-MCNC: 14 MG/DL (ref 6–23)
CALCIUM SERPL-MCNC: 9.3 MG/DL (ref 8.3–10.4)
CHLORIDE SERPL-SCNC: 110 MMOL/L (ref 101–110)
CO2 SERPL-SCNC: 27 MMOL/L (ref 21–32)
CREAT SERPL-MCNC: 1.1 MG/DL (ref 0.6–1)
CREAT UR-MCNC: 122 MG/DL
DIFFERENTIAL METHOD BLD: ABNORMAL
EOSINOPHIL # BLD: 0.2 K/UL (ref 0–0.8)
EOSINOPHIL NFR BLD: 2 % (ref 0.5–7.8)
ERYTHROCYTE [DISTWIDTH] IN BLOOD BY AUTOMATED COUNT: 16.2 % (ref 11.9–14.6)
GLOBULIN SER CALC-MCNC: 4.1 G/DL (ref 2.8–4.5)
GLUCOSE SERPL-MCNC: 93 MG/DL (ref 65–100)
HCT VFR BLD AUTO: 40 % (ref 35.8–46.3)
HGB BLD-MCNC: 12.1 G/DL (ref 11.7–15.4)
IMM GRANULOCYTES # BLD AUTO: 0 K/UL (ref 0–0.5)
IMM GRANULOCYTES NFR BLD AUTO: 0 % (ref 0–5)
LYMPHOCYTES # BLD: 2.4 K/UL (ref 0.5–4.6)
LYMPHOCYTES NFR BLD: 29 % (ref 13–44)
MCH RBC QN AUTO: 23.4 PG (ref 26.1–32.9)
MCHC RBC AUTO-ENTMCNC: 30.3 G/DL (ref 31.4–35)
MCV RBC AUTO: 77.4 FL (ref 82–102)
MICROALBUMIN UR-MCNC: 19.6 MG/DL
MICROALBUMIN/CREAT UR-RTO: 161 MG/G (ref 0–30)
MONOCYTES # BLD: 0.5 K/UL (ref 0.1–1.3)
MONOCYTES NFR BLD: 6 % (ref 4–12)
NEUTS SEG # BLD: 5.1 K/UL (ref 1.7–8.2)
NEUTS SEG NFR BLD: 61 % (ref 43–78)
NRBC # BLD: 0 K/UL (ref 0–0.2)
PLATELET # BLD AUTO: 286 K/UL (ref 150–450)
PMV BLD AUTO: 12.3 FL (ref 9.4–12.3)
POTASSIUM SERPL-SCNC: 4.1 MMOL/L (ref 3.5–5.1)
PROT SERPL-MCNC: 7.6 G/DL (ref 6.3–8.2)
RBC # BLD AUTO: 5.17 M/UL (ref 4.05–5.2)
SODIUM SERPL-SCNC: 140 MMOL/L (ref 133–143)
WBC # BLD AUTO: 8.4 K/UL (ref 4.3–11.1)

## 2023-07-13 PROCEDURE — 3078F DIAST BP <80 MM HG: CPT | Performed by: FAMILY MEDICINE

## 2023-07-13 PROCEDURE — 99214 OFFICE O/P EST MOD 30 MIN: CPT | Performed by: FAMILY MEDICINE

## 2023-07-13 PROCEDURE — 3074F SYST BP LT 130 MM HG: CPT | Performed by: FAMILY MEDICINE

## 2023-07-13 RX ORDER — DEXTROAMPHETAMINE SACCHARATE, AMPHETAMINE ASPARTATE MONOHYDRATE, DEXTROAMPHETAMINE SULFATE AND AMPHETAMINE SULFATE 3.75; 3.75; 3.75; 3.75 MG/1; MG/1; MG/1; MG/1
15 CAPSULE, EXTENDED RELEASE ORAL DAILY
Qty: 30 CAPSULE | Refills: 0 | Status: SHIPPED | OUTPATIENT
Start: 2023-08-12 | End: 2023-09-11

## 2023-07-13 RX ORDER — BUPROPION HYDROCHLORIDE 300 MG/1
300 TABLET ORAL EVERY MORNING
Qty: 90 TABLET | Refills: 1 | Status: SHIPPED | OUTPATIENT
Start: 2023-07-13

## 2023-07-13 RX ORDER — DEXTROAMPHETAMINE SACCHARATE, AMPHETAMINE ASPARTATE MONOHYDRATE, DEXTROAMPHETAMINE SULFATE AND AMPHETAMINE SULFATE 3.75; 3.75; 3.75; 3.75 MG/1; MG/1; MG/1; MG/1
15 CAPSULE, EXTENDED RELEASE ORAL DAILY
Qty: 30 CAPSULE | Refills: 0 | Status: SHIPPED | OUTPATIENT
Start: 2023-07-13 | End: 2023-08-12

## 2023-07-13 RX ORDER — CLONIDINE 0.1 MG/24H
PATCH, EXTENDED RELEASE TRANSDERMAL
Qty: 12 PATCH | Refills: 1 | Status: SHIPPED | OUTPATIENT
Start: 2023-07-13

## 2023-07-13 RX ORDER — DEXTROAMPHETAMINE SACCHARATE, AMPHETAMINE ASPARTATE MONOHYDRATE, DEXTROAMPHETAMINE SULFATE AND AMPHETAMINE SULFATE 3.75; 3.75; 3.75; 3.75 MG/1; MG/1; MG/1; MG/1
15 CAPSULE, EXTENDED RELEASE ORAL DAILY
Qty: 30 CAPSULE | Refills: 0 | Status: SHIPPED | OUTPATIENT
Start: 2023-09-11 | End: 2023-10-11

## 2023-07-13 SDOH — ECONOMIC STABILITY: FOOD INSECURITY: WITHIN THE PAST 12 MONTHS, THE FOOD YOU BOUGHT JUST DIDN'T LAST AND YOU DIDN'T HAVE MONEY TO GET MORE.: NEVER TRUE

## 2023-07-13 SDOH — ECONOMIC STABILITY: INCOME INSECURITY: HOW HARD IS IT FOR YOU TO PAY FOR THE VERY BASICS LIKE FOOD, HOUSING, MEDICAL CARE, AND HEATING?: NOT HARD AT ALL

## 2023-07-13 SDOH — ECONOMIC STABILITY: FOOD INSECURITY: WITHIN THE PAST 12 MONTHS, YOU WORRIED THAT YOUR FOOD WOULD RUN OUT BEFORE YOU GOT MONEY TO BUY MORE.: NEVER TRUE

## 2023-07-13 SDOH — ECONOMIC STABILITY: HOUSING INSECURITY
IN THE LAST 12 MONTHS, WAS THERE A TIME WHEN YOU DID NOT HAVE A STEADY PLACE TO SLEEP OR SLEPT IN A SHELTER (INCLUDING NOW)?: NO

## 2023-07-13 ASSESSMENT — PATIENT HEALTH QUESTIONNAIRE - PHQ9
SUM OF ALL RESPONSES TO PHQ QUESTIONS 1-9: 10
SUM OF ALL RESPONSES TO PHQ9 QUESTIONS 1 & 2: 3
6. FEELING BAD ABOUT YOURSELF - OR THAT YOU ARE A FAILURE OR HAVE LET YOURSELF OR YOUR FAMILY DOWN: 0
SUM OF ALL RESPONSES TO PHQ QUESTIONS 1-9: 10
9. THOUGHTS THAT YOU WOULD BE BETTER OFF DEAD, OR OF HURTING YOURSELF: 0
10. IF YOU CHECKED OFF ANY PROBLEMS, HOW DIFFICULT HAVE THESE PROBLEMS MADE IT FOR YOU TO DO YOUR WORK, TAKE CARE OF THINGS AT HOME, OR GET ALONG WITH OTHER PEOPLE: 1
5. POOR APPETITE OR OVEREATING: 0
1. LITTLE INTEREST OR PLEASURE IN DOING THINGS: 3
8. MOVING OR SPEAKING SO SLOWLY THAT OTHER PEOPLE COULD HAVE NOTICED. OR THE OPPOSITE, BEING SO FIGETY OR RESTLESS THAT YOU HAVE BEEN MOVING AROUND A LOT MORE THAN USUAL: 0
2. FEELING DOWN, DEPRESSED OR HOPELESS: 0
4. FEELING TIRED OR HAVING LITTLE ENERGY: 3
7. TROUBLE CONCENTRATING ON THINGS, SUCH AS READING THE NEWSPAPER OR WATCHING TELEVISION: 3
SUM OF ALL RESPONSES TO PHQ QUESTIONS 1-9: 10
SUM OF ALL RESPONSES TO PHQ QUESTIONS 1-9: 10
3. TROUBLE FALLING OR STAYING ASLEEP: 1

## 2023-07-13 ASSESSMENT — ENCOUNTER SYMPTOMS
BLURRED VISION: 0
SHORTNESS OF BREATH: 0
ORTHOPNEA: 0

## 2023-07-23 ASSESSMENT — ENCOUNTER SYMPTOMS
NAUSEA: 0
ABDOMINAL PAIN: 0
COUGH: 0
CONSTIPATION: 0
VOMITING: 0
RHINORRHEA: 0
WHEEZING: 0
DIARRHEA: 0

## 2023-09-12 ENCOUNTER — OFFICE VISIT (OUTPATIENT)
Dept: FAMILY MEDICINE CLINIC | Facility: CLINIC | Age: 33
End: 2023-09-12
Payer: COMMERCIAL

## 2023-09-12 VITALS
OXYGEN SATURATION: 99 % | SYSTOLIC BLOOD PRESSURE: 151 MMHG | RESPIRATION RATE: 16 BRPM | HEART RATE: 76 BPM | HEIGHT: 68 IN | DIASTOLIC BLOOD PRESSURE: 100 MMHG | BODY MASS INDEX: 35.55 KG/M2 | WEIGHT: 234.6 LBS | TEMPERATURE: 97.7 F

## 2023-09-12 DIAGNOSIS — I10 ESSENTIAL HYPERTENSION: ICD-10-CM

## 2023-09-12 DIAGNOSIS — Z20.822 EXPOSURE TO COVID-19 VIRUS: ICD-10-CM

## 2023-09-12 DIAGNOSIS — H10.9 CONJUNCTIVITIS OF LEFT EYE, UNSPECIFIED CONJUNCTIVITIS TYPE: Primary | ICD-10-CM

## 2023-09-12 LAB
EXP DATE SOLUTION: NORMAL
EXP DATE SWAB: NORMAL
EXPIRATION DATE: NORMAL
LOT NUMBER POC: NORMAL
LOT NUMBER SOLUTION: NORMAL
LOT NUMBER SWAB: NORMAL
SARS-COV-2 RNA, POC: NEGATIVE

## 2023-09-12 PROCEDURE — 3077F SYST BP >= 140 MM HG: CPT | Performed by: PHYSICIAN ASSISTANT

## 2023-09-12 PROCEDURE — 3080F DIAST BP >= 90 MM HG: CPT | Performed by: PHYSICIAN ASSISTANT

## 2023-09-12 PROCEDURE — 99213 OFFICE O/P EST LOW 20 MIN: CPT | Performed by: PHYSICIAN ASSISTANT

## 2023-09-12 PROCEDURE — 87635 SARS-COV-2 COVID-19 AMP PRB: CPT | Performed by: PHYSICIAN ASSISTANT

## 2023-09-12 RX ORDER — CIPROFLOXACIN HYDROCHLORIDE 3.5 MG/ML
1 SOLUTION/ DROPS TOPICAL EVERY 4 HOURS
Qty: 5 ML | Refills: 0 | Status: SHIPPED | OUTPATIENT
Start: 2023-09-12

## 2023-09-12 RX ORDER — NADOLOL 20 MG/1
20 TABLET ORAL DAILY
COMMUNITY

## 2023-09-12 ASSESSMENT — ENCOUNTER SYMPTOMS
WHEEZING: 0
COUGH: 1
PHOTOPHOBIA: 0
EYE ITCHING: 0
SORE THROAT: 0
SINUS PRESSURE: 0
EYE REDNESS: 1
EYE PAIN: 0
EYE DISCHARGE: 1
SINUS PAIN: 0
RHINORRHEA: 0
SHORTNESS OF BREATH: 0

## 2023-09-12 ASSESSMENT — VISUAL ACUITY: OU: 1

## 2023-09-12 NOTE — PROGRESS NOTES
[facility] [address]  Phone [phone]      Patient: Pily Ford  YOB: 1990  Age 29 y.o. Sex female  Medical Record:  [id number]  Visit Date: 23  Author:  Venessa Penn PA-C    Family Practice Clinic Note    Chief Complaint   Patient presents with    Conjunctivitis       History of Present Illness  This is a 28-year-old female who presents today with complaints of acute onset redness and irritation of the left eye. First noticed this yesterday. When she woke this morning her eyelashes were stuck together with crusted material.  She has had some persistent drainage from the left eye. Denies eye pain or vision change. No known contacts with anyone with similar symptoms however she states that she was with her brother 4 days ago and he called her yesterday to tell her that he tested positive for COVID. She has had some mild, intermittent cough but denies chest congestion, nasal congestion, rhinorrhea, sinus pain/pressure, sore throat, fever or chills.   She has not done a home COVID test.    Past History:    Past Medical history   Past Medical History:   Diagnosis Date    ADHD     Depression 2016    Hypertension     controlled with med    Left ankle pain     Migraine without aura and without status migrainosus, not intractable     Polycystic disease, ovaries     PONV (postoperative nausea and vomiting)     with c- sect 2016    Preeclampsia 2021     delivery        Current Problem List:   Patient Active Problem List   Diagnosis    History of cholestasis during pregnancy    High-risk pregnancy in second trimester    Obesity affecting pregnancy in second trimester    Preeclampsia    History of  delivery, currently pregnant in second trimester    Pre-eclampsia    BMI 36.0-36.9,adult     delivery delivered    Previous  section complicating pregnancy    Depression affecting pregnancy in second

## 2023-09-12 NOTE — PATIENT INSTRUCTIONS
*Use the Ciloxan drops every 4 hours, while awake, for the next 7 to 10 days. *Wash hands frequently. *Continue your current blood pressure medication for now. Please keep track of your blood pressure. Check it 3-4 days a week. Write down your results and bring them with you to your next office visit for review. We'd like you to stay < 140/90, and ideally < 130/80. Let us know if you find yourself above this routinely.

## 2023-09-14 ENCOUNTER — PATIENT MESSAGE (OUTPATIENT)
Dept: FAMILY MEDICINE CLINIC | Facility: CLINIC | Age: 33
End: 2023-09-14

## 2023-09-14 DIAGNOSIS — F90.9 ADULT ADHD (ATTENTION DEFICIT HYPERACTIVITY DISORDER): Primary | ICD-10-CM

## 2023-09-14 RX ORDER — DEXTROAMPHETAMINE SACCHARATE, AMPHETAMINE ASPARTATE MONOHYDRATE, DEXTROAMPHETAMINE SULFATE AND AMPHETAMINE SULFATE 5; 5; 5; 5 MG/1; MG/1; MG/1; MG/1
20 CAPSULE, EXTENDED RELEASE ORAL DAILY
Qty: 30 CAPSULE | Refills: 0 | Status: SHIPPED | OUTPATIENT
Start: 2023-09-14 | End: 2023-10-14

## 2023-09-14 NOTE — TELEPHONE ENCOUNTER
Prescription(s) refilled  It (they) has been escribed to the pharmacy. Requested Prescriptions     Signed Prescriptions Disp Refills    amphetamine-dextroamphetamine (ADDERALL XR) 20 MG extended release capsule 30 capsule 0     Sig: Take 1 capsule by mouth daily for 30 days. Max Daily Amount: 20 mg     Authorizing Provider: WHIT GALVEZ     No orders of the defined types were placed in this encounter. ICD-10-CM    1.  Adult ADHD (attention deficit hyperactivity disorder)  F90.9 amphetamine-dextroamphetamine (ADDERALL XR) 20 MG extended release capsule

## 2023-10-12 PROBLEM — O10.012 CHRONIC BENIGN ESSENTIAL HYPERTENSION IN SECOND TRIMESTER: Status: RESOLVED | Noted: 2020-12-01 | Resolved: 2023-10-12

## 2023-10-12 PROBLEM — O99.019 ANEMIA IN PREGNANCY: Status: RESOLVED | Noted: 2021-04-29 | Resolved: 2023-10-12

## 2023-10-12 ASSESSMENT — ENCOUNTER SYMPTOMS
BLURRED VISION: 0
ORTHOPNEA: 0
SHORTNESS OF BREATH: 0

## 2023-10-13 ENCOUNTER — OFFICE VISIT (OUTPATIENT)
Dept: FAMILY MEDICINE CLINIC | Facility: CLINIC | Age: 33
End: 2023-10-13
Payer: COMMERCIAL

## 2023-10-13 VITALS
DIASTOLIC BLOOD PRESSURE: 103 MMHG | RESPIRATION RATE: 16 BRPM | WEIGHT: 236 LBS | HEIGHT: 68 IN | SYSTOLIC BLOOD PRESSURE: 148 MMHG | OXYGEN SATURATION: 98 % | TEMPERATURE: 97.7 F | BODY MASS INDEX: 35.77 KG/M2 | HEART RATE: 73 BPM

## 2023-10-13 DIAGNOSIS — F33.42 RECURRENT MAJOR DEPRESSIVE DISORDER, IN FULL REMISSION (HCC): ICD-10-CM

## 2023-10-13 DIAGNOSIS — I10 ESSENTIAL HYPERTENSION: Primary | ICD-10-CM

## 2023-10-13 DIAGNOSIS — F90.9 ADULT ADHD (ATTENTION DEFICIT HYPERACTIVITY DISORDER): ICD-10-CM

## 2023-10-13 PROCEDURE — 3079F DIAST BP 80-89 MM HG: CPT | Performed by: FAMILY MEDICINE

## 2023-10-13 PROCEDURE — 99214 OFFICE O/P EST MOD 30 MIN: CPT | Performed by: FAMILY MEDICINE

## 2023-10-13 PROCEDURE — 3077F SYST BP >= 140 MM HG: CPT | Performed by: FAMILY MEDICINE

## 2023-10-13 RX ORDER — DEXTROAMPHETAMINE SACCHARATE, AMPHETAMINE ASPARTATE, DEXTROAMPHETAMINE SULFATE AND AMPHETAMINE SULFATE 5; 5; 5; 5 MG/1; MG/1; MG/1; MG/1
20 TABLET ORAL 2 TIMES DAILY
Qty: 60 TABLET | Refills: 0 | Status: SHIPPED | OUTPATIENT
Start: 2023-10-13 | End: 2023-11-12

## 2023-10-13 RX ORDER — DEXTROAMPHETAMINE SACCHARATE, AMPHETAMINE ASPARTATE, DEXTROAMPHETAMINE SULFATE AND AMPHETAMINE SULFATE 5; 5; 5; 5 MG/1; MG/1; MG/1; MG/1
20 TABLET ORAL 2 TIMES DAILY
Qty: 60 TABLET | Refills: 0 | Status: SHIPPED | OUTPATIENT
Start: 2023-11-12 | End: 2023-12-12

## 2023-10-13 RX ORDER — NADOLOL 40 MG/1
40 TABLET ORAL DAILY
Qty: 90 TABLET | Refills: 1 | Status: SHIPPED | OUTPATIENT
Start: 2023-10-13

## 2023-10-13 RX ORDER — DEXTROAMPHETAMINE SACCHARATE, AMPHETAMINE ASPARTATE, DEXTROAMPHETAMINE SULFATE AND AMPHETAMINE SULFATE 5; 5; 5; 5 MG/1; MG/1; MG/1; MG/1
20 TABLET ORAL 2 TIMES DAILY
Qty: 60 TABLET | Refills: 0 | Status: SHIPPED | OUTPATIENT
Start: 2023-12-12 | End: 2024-01-11

## 2023-10-13 RX ORDER — BUPROPION HYDROCHLORIDE 300 MG/1
300 TABLET ORAL EVERY MORNING
Qty: 90 TABLET | Refills: 1 | Status: SHIPPED | OUTPATIENT
Start: 2023-10-13

## 2023-10-13 RX ORDER — SEMAGLUTIDE 0.25 MG/.5ML
INJECTION, SOLUTION SUBCUTANEOUS
COMMUNITY
Start: 2023-09-21

## 2023-10-13 ASSESSMENT — ENCOUNTER SYMPTOMS
ABDOMINAL PAIN: 0
DIARRHEA: 0
VOMITING: 0
NAUSEA: 0
CONSTIPATION: 0
RHINORRHEA: 0
COUGH: 0
WHEEZING: 0

## 2023-10-13 ASSESSMENT — PATIENT HEALTH QUESTIONNAIRE - PHQ9
3. TROUBLE FALLING OR STAYING ASLEEP: 0
2. FEELING DOWN, DEPRESSED OR HOPELESS: 0
5. POOR APPETITE OR OVEREATING: 0
SUM OF ALL RESPONSES TO PHQ QUESTIONS 1-9: 0
1. LITTLE INTEREST OR PLEASURE IN DOING THINGS: 0
SUM OF ALL RESPONSES TO PHQ9 QUESTIONS 1 & 2: 0
4. FEELING TIRED OR HAVING LITTLE ENERGY: 0
7. TROUBLE CONCENTRATING ON THINGS, SUCH AS READING THE NEWSPAPER OR WATCHING TELEVISION: 0
9. THOUGHTS THAT YOU WOULD BE BETTER OFF DEAD, OR OF HURTING YOURSELF: 0
6. FEELING BAD ABOUT YOURSELF - OR THAT YOU ARE A FAILURE OR HAVE LET YOURSELF OR YOUR FAMILY DOWN: 0
SUM OF ALL RESPONSES TO PHQ QUESTIONS 1-9: 0
8. MOVING OR SPEAKING SO SLOWLY THAT OTHER PEOPLE COULD HAVE NOTICED. OR THE OPPOSITE, BEING SO FIGETY OR RESTLESS THAT YOU HAVE BEEN MOVING AROUND A LOT MORE THAN USUAL: 0
SUM OF ALL RESPONSES TO PHQ QUESTIONS 1-9: 0
10. IF YOU CHECKED OFF ANY PROBLEMS, HOW DIFFICULT HAVE THESE PROBLEMS MADE IT FOR YOU TO DO YOUR WORK, TAKE CARE OF THINGS AT HOME, OR GET ALONG WITH OTHER PEOPLE: 0
SUM OF ALL RESPONSES TO PHQ QUESTIONS 1-9: 0

## 2023-10-13 NOTE — PROGRESS NOTES
rate and regular rhythm. Heart sounds: Normal heart sounds. No murmur heard. No friction rub. No gallop. Pulmonary:      Effort: Pulmonary effort is normal. No respiratory distress. Breath sounds: Normal breath sounds. No wheezing or rales. Musculoskeletal:      Right lower leg: No edema. Left lower leg: No edema. Skin:     General: Skin is warm and dry. Neurological:      General: No focal deficit present. Mental Status: She is alert. Psychiatric:         Mood and Affect: Mood normal.         Behavior: Behavior normal.         Thought Content: Thought content normal.         Judgment: Judgment normal.              ASSESSMENT and PLAN   Diagnosis Orders   1. Essential hypertension  nadolol (CORGARD) 40 MG tablet      2. Recurrent major depressive disorder, in full remission ([address])  buPROPion (WELLBUTRIN XL) 300 MG extended release tablet      3. Adult ADHD (attention deficit hyperactivity disorder)  amphetamine-dextroamphetamine (ADDERALL) 20 MG tablet    amphetamine-dextroamphetamine (ADDERALL) 20 MG tablet    amphetamine-dextroamphetamine (ADDERALL) 20 MG tablet          Reviewed medications and side effects in detail    I have reviewed this patient's report generated by the Prescription Monitoring Program which does not demonstrate aberrancies or inconsistencies with regard to the historical prescribing of controlled medications to this patient by other providers. No orders of the defined types were placed in this encounter. Requested Prescriptions     Signed Prescriptions Disp Refills    buPROPion (WELLBUTRIN XL) 300 MG extended release tablet 90 tablet 1     Sig: Take 1 tablet by mouth every morning    amphetamine-dextroamphetamine (ADDERALL) 20 MG tablet 60 tablet 0     Sig: Take 1 tablet by mouth 2 times daily for 30 days. Max Daily Amount: 40 mg    amphetamine-dextroamphetamine (ADDERALL) 20 MG tablet 60 tablet 0     Sig: Take 1 tablet by mouth 2 times daily for 30 days.

## 2024-01-02 ENCOUNTER — PATIENT MESSAGE (OUTPATIENT)
Dept: FAMILY MEDICINE CLINIC | Facility: CLINIC | Age: 34
End: 2024-01-02

## 2024-01-02 DIAGNOSIS — N93.9 ABNORMAL UTERINE BLEEDING (AUB): Primary | ICD-10-CM

## 2024-01-02 NOTE — TELEPHONE ENCOUNTER
From: Beatriz Mccormick  To: Dr. Deonna Del Cid  Sent: 1/2/2024 4:28 PM EST  Subject: Rx Beatriz Rubio, I was wondering if I could get an Rx sent to the [facility] in [place] for Anuradha (ulipristal), it’s an emergency contraceptive but requires a prescription to get it.   Thank you

## 2024-01-02 NOTE — TELEPHONE ENCOUNTER
Spoke to pt.  Had exposure on early 1/1/24.    Prescription(s) refilled  It (they) has been escribed to the pharmacy.    Requested Prescriptions     Signed Prescriptions Disp Refills    ulipristal acetate (JOSE) 30 MG TABS 1 tablet 0     Sig: Take 1 tablet by mouth once for 1 dose     Authorizing Provider: WHIT GALVEZ     No orders of the defined types were placed in this encounter.          ICD-10-CM    1. Abnormal uterine bleeding (AUB)  N93.9 ulipristal acetate (JOSE) 30 MG TABS

## 2024-01-17 ENCOUNTER — OFFICE VISIT (OUTPATIENT)
Dept: FAMILY MEDICINE CLINIC | Facility: CLINIC | Age: 34
End: 2024-01-17
Payer: COMMERCIAL

## 2024-01-17 VITALS
BODY MASS INDEX: 35.16 KG/M2 | DIASTOLIC BLOOD PRESSURE: 114 MMHG | TEMPERATURE: 99 F | WEIGHT: 232 LBS | RESPIRATION RATE: 16 BRPM | HEART RATE: 70 BPM | HEIGHT: 68 IN | OXYGEN SATURATION: 99 % | SYSTOLIC BLOOD PRESSURE: 162 MMHG

## 2024-01-17 DIAGNOSIS — I10 ESSENTIAL HYPERTENSION: Primary | ICD-10-CM

## 2024-01-17 DIAGNOSIS — E66.01 SEVERE OBESITY (BMI 35.0-39.9) WITH COMORBIDITY (HCC): ICD-10-CM

## 2024-01-17 DIAGNOSIS — F90.9 ADULT ADHD (ATTENTION DEFICIT HYPERACTIVITY DISORDER): ICD-10-CM

## 2024-01-17 DIAGNOSIS — M65.9 SYNOVITIS OF HAND: ICD-10-CM

## 2024-01-17 DIAGNOSIS — M19.049 HAND ARTHRITIS: ICD-10-CM

## 2024-01-17 PROCEDURE — 3077F SYST BP >= 140 MM HG: CPT | Performed by: FAMILY MEDICINE

## 2024-01-17 PROCEDURE — 99214 OFFICE O/P EST MOD 30 MIN: CPT | Performed by: FAMILY MEDICINE

## 2024-01-17 PROCEDURE — 3080F DIAST BP >= 90 MM HG: CPT | Performed by: FAMILY MEDICINE

## 2024-01-17 RX ORDER — DEXTROAMPHETAMINE SACCHARATE, AMPHETAMINE ASPARTATE, DEXTROAMPHETAMINE SULFATE AND AMPHETAMINE SULFATE 5; 5; 5; 5 MG/1; MG/1; MG/1; MG/1
20 TABLET ORAL 2 TIMES DAILY
Qty: 60 TABLET | Refills: 0 | Status: SHIPPED | OUTPATIENT
Start: 2024-03-17 | End: 2024-04-16

## 2024-01-17 RX ORDER — DEXTROAMPHETAMINE SACCHARATE, AMPHETAMINE ASPARTATE, DEXTROAMPHETAMINE SULFATE AND AMPHETAMINE SULFATE 5; 5; 5; 5 MG/1; MG/1; MG/1; MG/1
20 TABLET ORAL 2 TIMES DAILY
Qty: 60 TABLET | Refills: 0 | Status: SHIPPED | OUTPATIENT
Start: 2024-02-16 | End: 2024-03-17

## 2024-01-17 RX ORDER — SEMAGLUTIDE 1.7 MG/.75ML
INJECTION, SOLUTION SUBCUTANEOUS
COMMUNITY
Start: 2024-01-07

## 2024-01-17 RX ORDER — DEXTROAMPHETAMINE SACCHARATE, AMPHETAMINE ASPARTATE, DEXTROAMPHETAMINE SULFATE AND AMPHETAMINE SULFATE 5; 5; 5; 5 MG/1; MG/1; MG/1; MG/1
20 TABLET ORAL 2 TIMES DAILY
Qty: 60 TABLET | Refills: 0 | Status: SHIPPED | OUTPATIENT
Start: 2024-01-17 | End: 2024-02-16

## 2024-01-17 RX ORDER — CLONIDINE 0.2 MG/24H
1 PATCH, EXTENDED RELEASE TRANSDERMAL
Qty: 4 PATCH | Refills: 5 | Status: SHIPPED | OUTPATIENT
Start: 2024-01-17 | End: 2025-01-16

## 2024-01-17 RX ORDER — ULIPRISTAL ACETATE 30 MG/1
TABLET ORAL
COMMUNITY
Start: 2024-01-03

## 2024-01-17 RX ORDER — CLONIDINE 0.1 MG/24H
PATCH, EXTENDED RELEASE TRANSDERMAL
Qty: 12 PATCH | Refills: 1 | Status: CANCELLED | OUTPATIENT
Start: 2024-01-17

## 2024-01-17 ASSESSMENT — PATIENT HEALTH QUESTIONNAIRE - PHQ9
SUM OF ALL RESPONSES TO PHQ QUESTIONS 1-9: 0
SUM OF ALL RESPONSES TO PHQ QUESTIONS 1-9: 0
7. TROUBLE CONCENTRATING ON THINGS, SUCH AS READING THE NEWSPAPER OR WATCHING TELEVISION: 0
3. TROUBLE FALLING OR STAYING ASLEEP: 0
1. LITTLE INTEREST OR PLEASURE IN DOING THINGS: 0
5. POOR APPETITE OR OVEREATING: 0
6. FEELING BAD ABOUT YOURSELF - OR THAT YOU ARE A FAILURE OR HAVE LET YOURSELF OR YOUR FAMILY DOWN: 0
10. IF YOU CHECKED OFF ANY PROBLEMS, HOW DIFFICULT HAVE THESE PROBLEMS MADE IT FOR YOU TO DO YOUR WORK, TAKE CARE OF THINGS AT HOME, OR GET ALONG WITH OTHER PEOPLE: 0
2. FEELING DOWN, DEPRESSED OR HOPELESS: 0
8. MOVING OR SPEAKING SO SLOWLY THAT OTHER PEOPLE COULD HAVE NOTICED. OR THE OPPOSITE, BEING SO FIGETY OR RESTLESS THAT YOU HAVE BEEN MOVING AROUND A LOT MORE THAN USUAL: 0
SUM OF ALL RESPONSES TO PHQ QUESTIONS 1-9: 0
4. FEELING TIRED OR HAVING LITTLE ENERGY: 0
SUM OF ALL RESPONSES TO PHQ9 QUESTIONS 1 & 2: 0
9. THOUGHTS THAT YOU WOULD BE BETTER OFF DEAD, OR OF HURTING YOURSELF: 0
SUM OF ALL RESPONSES TO PHQ QUESTIONS 1-9: 0

## 2024-04-16 ASSESSMENT — ENCOUNTER SYMPTOMS
BLURRED VISION: 0
ORTHOPNEA: 0
SHORTNESS OF BREATH: 0

## 2024-04-17 ENCOUNTER — OFFICE VISIT (OUTPATIENT)
Dept: FAMILY MEDICINE CLINIC | Facility: CLINIC | Age: 34
End: 2024-04-17
Payer: COMMERCIAL

## 2024-04-17 VITALS
HEIGHT: 68 IN | WEIGHT: 231.8 LBS | DIASTOLIC BLOOD PRESSURE: 105 MMHG | BODY MASS INDEX: 35.13 KG/M2 | SYSTOLIC BLOOD PRESSURE: 162 MMHG | TEMPERATURE: 98.4 F | HEART RATE: 72 BPM | RESPIRATION RATE: 16 BRPM | OXYGEN SATURATION: 100 %

## 2024-04-17 DIAGNOSIS — F90.9 ADULT ADHD (ATTENTION DEFICIT HYPERACTIVITY DISORDER): ICD-10-CM

## 2024-04-17 DIAGNOSIS — F33.42 RECURRENT MAJOR DEPRESSIVE DISORDER, IN FULL REMISSION (HCC): ICD-10-CM

## 2024-04-17 DIAGNOSIS — I10 ESSENTIAL HYPERTENSION: Primary | ICD-10-CM

## 2024-04-17 PROCEDURE — 3080F DIAST BP >= 90 MM HG: CPT | Performed by: FAMILY MEDICINE

## 2024-04-17 PROCEDURE — 3077F SYST BP >= 140 MM HG: CPT | Performed by: FAMILY MEDICINE

## 2024-04-17 PROCEDURE — 99214 OFFICE O/P EST MOD 30 MIN: CPT | Performed by: FAMILY MEDICINE

## 2024-04-17 RX ORDER — DEXTROAMPHETAMINE SACCHARATE, AMPHETAMINE ASPARTATE, DEXTROAMPHETAMINE SULFATE AND AMPHETAMINE SULFATE 5; 5; 5; 5 MG/1; MG/1; MG/1; MG/1
20 TABLET ORAL 2 TIMES DAILY
Qty: 60 TABLET | Refills: 0 | Status: SHIPPED | OUTPATIENT
Start: 2024-05-17 | End: 2024-06-16

## 2024-04-17 RX ORDER — SEMAGLUTIDE 2.4 MG/.75ML
INJECTION, SOLUTION SUBCUTANEOUS
COMMUNITY
Start: 2024-01-20

## 2024-04-17 RX ORDER — DEXTROAMPHETAMINE SACCHARATE, AMPHETAMINE ASPARTATE, DEXTROAMPHETAMINE SULFATE AND AMPHETAMINE SULFATE 5; 5; 5; 5 MG/1; MG/1; MG/1; MG/1
20 TABLET ORAL 2 TIMES DAILY
Qty: 60 TABLET | Refills: 0 | Status: SHIPPED | OUTPATIENT
Start: 2024-04-17 | End: 2024-05-17

## 2024-04-17 RX ORDER — SEMAGLUTIDE 1 MG/.5ML
INJECTION, SOLUTION SUBCUTANEOUS
COMMUNITY
Start: 2024-04-09

## 2024-04-17 RX ORDER — DEXTROAMPHETAMINE SACCHARATE, AMPHETAMINE ASPARTATE, DEXTROAMPHETAMINE SULFATE AND AMPHETAMINE SULFATE 5; 5; 5; 5 MG/1; MG/1; MG/1; MG/1
20 TABLET ORAL 2 TIMES DAILY
Qty: 60 TABLET | Refills: 0 | Status: SHIPPED | OUTPATIENT
Start: 2024-06-16 | End: 2024-07-16

## 2024-04-17 RX ORDER — BLOOD PRESSURE TEST KIT
KIT MISCELLANEOUS
Qty: 1 KIT | Refills: 0 | Status: SHIPPED | OUTPATIENT
Start: 2024-04-17

## 2024-04-17 RX ORDER — OLMESARTAN MEDOXOMIL AND HYDROCHLOROTHIAZIDE 20/12.5 20; 12.5 MG/1; MG/1
TABLET ORAL
Qty: 90 TABLET | Refills: 1 | Status: SHIPPED | OUTPATIENT
Start: 2024-04-17

## 2024-04-17 RX ORDER — DEXTROAMPHETAMINE SACCHARATE, AMPHETAMINE ASPARTATE, DEXTROAMPHETAMINE SULFATE AND AMPHETAMINE SULFATE 5; 5; 5; 5 MG/1; MG/1; MG/1; MG/1
20 TABLET ORAL 2 TIMES DAILY
Qty: 60 TABLET | Refills: 0 | Status: CANCELLED | OUTPATIENT
Start: 2024-04-17 | End: 2024-05-17

## 2024-04-17 ASSESSMENT — PATIENT HEALTH QUESTIONNAIRE - PHQ9
9. THOUGHTS THAT YOU WOULD BE BETTER OFF DEAD, OR OF HURTING YOURSELF: NOT AT ALL
1. LITTLE INTEREST OR PLEASURE IN DOING THINGS: NOT AT ALL
10. IF YOU CHECKED OFF ANY PROBLEMS, HOW DIFFICULT HAVE THESE PROBLEMS MADE IT FOR YOU TO DO YOUR WORK, TAKE CARE OF THINGS AT HOME, OR GET ALONG WITH OTHER PEOPLE: NOT DIFFICULT AT ALL
2. FEELING DOWN, DEPRESSED OR HOPELESS: NOT AT ALL
3. TROUBLE FALLING OR STAYING ASLEEP: NOT AT ALL
7. TROUBLE CONCENTRATING ON THINGS, SUCH AS READING THE NEWSPAPER OR WATCHING TELEVISION: NOT AT ALL
5. POOR APPETITE OR OVEREATING: NOT AT ALL
6. FEELING BAD ABOUT YOURSELF - OR THAT YOU ARE A FAILURE OR HAVE LET YOURSELF OR YOUR FAMILY DOWN: NOT AT ALL
SUM OF ALL RESPONSES TO PHQ QUESTIONS 1-9: 0
SUM OF ALL RESPONSES TO PHQ QUESTIONS 1-9: 0
8. MOVING OR SPEAKING SO SLOWLY THAT OTHER PEOPLE COULD HAVE NOTICED. OR THE OPPOSITE, BEING SO FIGETY OR RESTLESS THAT YOU HAVE BEEN MOVING AROUND A LOT MORE THAN USUAL: NOT AT ALL
SUM OF ALL RESPONSES TO PHQ QUESTIONS 1-9: 0
SUM OF ALL RESPONSES TO PHQ QUESTIONS 1-9: 0
SUM OF ALL RESPONSES TO PHQ9 QUESTIONS 1 & 2: 0
4. FEELING TIRED OR HAVING LITTLE ENERGY: NOT AT ALL

## 2024-04-17 ASSESSMENT — ENCOUNTER SYMPTOMS
ABDOMINAL PAIN: 0
NAUSEA: 0
WHEEZING: 0
DIARRHEA: 0
RHINORRHEA: 0
COUGH: 0
CONSTIPATION: 0
VOMITING: 0

## 2024-04-17 NOTE — PROGRESS NOTES
HISTORY OF PRESENT ILLNESS  Chief Complaint   Patient presents with    Follow-up     Hypertension       Medication Refill     Adderall 20mg     NOTICE FOR THE PATIENT: This clinical note is not designed to be interpreted by patients.  We do not recommend reading it unless you have medical training. These notes may contain candid and (unintentionally) offensive descriptions, which are sometimes required for accurate documentation. If you would like more information about your healthcare, please obtain it directly by myself or my staff/colleagues - never solely from the notes. Thank you for your understanding and cooperation.       Bp up and down at home.  Stays high at pharmacy when she checks it.  Subjective:   Beatriz Mccormick is a 33 y.o. female with hypertension.    Hypertension ROS: taking medications as instructed, no medication side effects noted, no TIA's, no chest pain on exertion, no dyspnea on exertion, no swelling of ankles.   Cardiac Medications       Antiadrenergic Antihypertensives       cloNIDine (CATAPRES-TTS-2) 0.2 MG/24HR PTWK Place 1 patch onto the skin every 7 days       Antihypertensive Combinations       olmesartan-hydroCHLOROthiazide (BENICAR HCT) 20-12.5 MG per tablet 1 po qd for blood pressure       Beta Blockers Non-Selective       nadolol (CORGARD) 40 MG tablet Take 1 tablet by mouth daily           Lab Results   Component Value Date/Time     07/13/2023 02:43 PM    K 4.1 07/13/2023 02:43 PM     07/13/2023 02:43 PM    CO2 27 07/13/2023 02:43 PM    BUN 14 07/13/2023 02:43 PM    CREATININE 1.10 07/13/2023 02:43 PM    GLUCOSE 93 07/13/2023 02:43 PM    CALCIUM 9.3 07/13/2023 02:43 PM       Lab Results   Component Value Date    CREATININE 1.10 (H) 07/13/2023      No results found for: \"CHOL\"  No results found for: \"TRIG\"  No results found for: \"HDL\"  No results found for: \"LDLCHOLESTEROL\", \"LDLCALC\"  No results found for: \"VLDL\"  No results found for: \"CHOLHDLRATIO\"   Lab

## 2024-04-30 ENCOUNTER — OFFICE VISIT (OUTPATIENT)
Dept: FAMILY MEDICINE CLINIC | Facility: CLINIC | Age: 34
End: 2024-04-30
Payer: COMMERCIAL

## 2024-04-30 VITALS
DIASTOLIC BLOOD PRESSURE: 81 MMHG | HEART RATE: 89 BPM | BODY MASS INDEX: 34.25 KG/M2 | SYSTOLIC BLOOD PRESSURE: 130 MMHG | WEIGHT: 226 LBS | HEIGHT: 68 IN | OXYGEN SATURATION: 100 % | RESPIRATION RATE: 16 BRPM | TEMPERATURE: 98.2 F

## 2024-04-30 DIAGNOSIS — M54.50 LOW BACK PAIN, UNSPECIFIED BACK PAIN LATERALITY, UNSPECIFIED CHRONICITY, UNSPECIFIED WHETHER SCIATICA PRESENT: Primary | ICD-10-CM

## 2024-04-30 DIAGNOSIS — F33.42 RECURRENT MAJOR DEPRESSIVE DISORDER, IN FULL REMISSION (HCC): ICD-10-CM

## 2024-04-30 DIAGNOSIS — R10.9 FLANK PAIN: ICD-10-CM

## 2024-04-30 DIAGNOSIS — N30.01 ACUTE CYSTITIS WITH HEMATURIA: ICD-10-CM

## 2024-04-30 DIAGNOSIS — I10 ESSENTIAL HYPERTENSION: ICD-10-CM

## 2024-04-30 LAB
BILIRUBIN, URINE, POC: NEGATIVE
BLOOD URINE, POC: NORMAL
GLUCOSE URINE, POC: NEGATIVE
KETONES, URINE, POC: NEGATIVE
LEUKOCYTE ESTERASE, URINE, POC: NORMAL
NITRITE, URINE, POC: NEGATIVE
PH, URINE, POC: 6 (ref 4.6–8)
PROTEIN,URINE, POC: NORMAL
SPECIFIC GRAVITY, URINE, POC: 1 (ref 1–1.03)
URINALYSIS CLARITY, POC: CLEAR
URINALYSIS COLOR, POC: YELLOW
UROBILINOGEN, POC: NORMAL

## 2024-04-30 PROCEDURE — 3075F SYST BP GE 130 - 139MM HG: CPT | Performed by: FAMILY MEDICINE

## 2024-04-30 PROCEDURE — 99214 OFFICE O/P EST MOD 30 MIN: CPT | Performed by: FAMILY MEDICINE

## 2024-04-30 PROCEDURE — 3079F DIAST BP 80-89 MM HG: CPT | Performed by: FAMILY MEDICINE

## 2024-04-30 PROCEDURE — 81002 URINALYSIS NONAUTO W/O SCOPE: CPT | Performed by: FAMILY MEDICINE

## 2024-04-30 RX ORDER — NADOLOL 40 MG/1
40 TABLET ORAL DAILY
Qty: 90 TABLET | Refills: 1 | Status: SHIPPED | OUTPATIENT
Start: 2024-04-30

## 2024-04-30 RX ORDER — BUPROPION HYDROCHLORIDE 300 MG/1
300 TABLET ORAL EVERY MORNING
Qty: 90 TABLET | Refills: 1 | Status: SHIPPED | OUTPATIENT
Start: 2024-04-30

## 2024-04-30 RX ORDER — PHENAZOPYRIDINE HYDROCHLORIDE 200 MG/1
200 TABLET, FILM COATED ORAL 3 TIMES DAILY PRN
Qty: 9 TABLET | Refills: 0 | Status: SHIPPED | OUTPATIENT
Start: 2024-04-30 | End: 2024-05-03

## 2024-04-30 RX ORDER — NITROFURANTOIN 25; 75 MG/1; MG/1
100 CAPSULE ORAL 2 TIMES DAILY
Qty: 14 CAPSULE | Refills: 0 | Status: SHIPPED | OUTPATIENT
Start: 2024-04-30 | End: 2024-05-07

## 2024-04-30 ASSESSMENT — ENCOUNTER SYMPTOMS
VOMITING: 0
CONSTIPATION: 0
RHINORRHEA: 0
SHORTNESS OF BREATH: 0
BOWEL INCONTINENCE: 0
COUGH: 0
NAUSEA: 0
WHEEZING: 0
ABDOMINAL PAIN: 0
DIARRHEA: 0

## 2024-04-30 ASSESSMENT — PATIENT HEALTH QUESTIONNAIRE - PHQ9
10. IF YOU CHECKED OFF ANY PROBLEMS, HOW DIFFICULT HAVE THESE PROBLEMS MADE IT FOR YOU TO DO YOUR WORK, TAKE CARE OF THINGS AT HOME, OR GET ALONG WITH OTHER PEOPLE: NOT DIFFICULT AT ALL
9. THOUGHTS THAT YOU WOULD BE BETTER OFF DEAD, OR OF HURTING YOURSELF: NOT AT ALL
8. MOVING OR SPEAKING SO SLOWLY THAT OTHER PEOPLE COULD HAVE NOTICED. OR THE OPPOSITE, BEING SO FIGETY OR RESTLESS THAT YOU HAVE BEEN MOVING AROUND A LOT MORE THAN USUAL: NOT AT ALL
4. FEELING TIRED OR HAVING LITTLE ENERGY: NOT AT ALL
2. FEELING DOWN, DEPRESSED OR HOPELESS: NOT AT ALL
3. TROUBLE FALLING OR STAYING ASLEEP: NOT AT ALL
SUM OF ALL RESPONSES TO PHQ QUESTIONS 1-9: 0
1. LITTLE INTEREST OR PLEASURE IN DOING THINGS: NOT AT ALL
5. POOR APPETITE OR OVEREATING: NOT AT ALL
6. FEELING BAD ABOUT YOURSELF - OR THAT YOU ARE A FAILURE OR HAVE LET YOURSELF OR YOUR FAMILY DOWN: NOT AT ALL
SUM OF ALL RESPONSES TO PHQ QUESTIONS 1-9: 0
SUM OF ALL RESPONSES TO PHQ QUESTIONS 1-9: 0
SUM OF ALL RESPONSES TO PHQ9 QUESTIONS 1 & 2: 0
7. TROUBLE CONCENTRATING ON THINGS, SUCH AS READING THE NEWSPAPER OR WATCHING TELEVISION: NOT AT ALL
SUM OF ALL RESPONSES TO PHQ QUESTIONS 1-9: 0

## 2024-04-30 NOTE — PROGRESS NOTES
pain.   Musculoskeletal:  Negative for arthralgias and myalgias.   Neurological:  Negative for dizziness, tingling, weakness, numbness, headaches and paresthesias.   Psychiatric/Behavioral:  Negative for dysphoric mood. The patient is not nervous/anxious.         Patient Active Problem List   Diagnosis    BMI 36.0-36.9,adult    Ankle fracture, bimalleolar, closed, left, initial encounter    Essential hypertension    Recurrent major depressive disorder, in full remission ([facility])         Blood pressure 130/81, pulse 89, temperature 98.2 °F (36.8 °C), temperature source Temporal, resp. rate 16, height 1.727 m (5' 8\"), weight 102.5 kg (226 lb), last menstrual period 04/29/2024, SpO2 100 %. Pain Scale: 5/10 Pain Location: Back  Body mass index is 34.36 kg/m².     Physical Exam  Constitutional:       General: She is not in acute distress.     Appearance: Normal appearance. She is normal weight. She is not toxic-appearing.   HENT:      Head: Normocephalic and atraumatic.   Eyes:      Extraocular Movements: Extraocular movements intact.      Conjunctiva/sclera: Conjunctivae normal.      Pupils: Pupils are equal, round, and reactive to light.   Cardiovascular:      Rate and Rhythm: Normal rate and regular rhythm.      Pulses: Normal pulses.      Heart sounds: Normal heart sounds. No murmur heard.     No friction rub. No gallop.   Pulmonary:      Effort: Pulmonary effort is normal. No respiratory distress.      Breath sounds: Normal breath sounds. No wheezing or rales.   Abdominal:      General: Abdomen is flat. Bowel sounds are normal. There is no distension or abdominal bruit. There are no signs of injury.      Palpations: Abdomen is soft. There is no hepatomegaly or mass.      Tenderness: There is abdominal tenderness in the suprapubic area. There is no right CVA tenderness, left CVA tenderness, guarding or rebound.      Hernia: No hernia is present.   Skin:     General: Skin is warm and dry.   Neurological:      Mental

## 2024-05-02 ENCOUNTER — HOSPITAL ENCOUNTER (OUTPATIENT)
Dept: CT IMAGING | Age: 34
Discharge: HOME | End: 2024-05-02
Attending: FAMILY MEDICINE
Payer: COMMERCIAL

## 2024-05-02 ENCOUNTER — OFFICE VISIT (OUTPATIENT)
Dept: FAMILY MEDICINE CLINIC | Facility: CLINIC | Age: 34
End: 2024-05-02
Payer: COMMERCIAL

## 2024-05-02 VITALS
HEART RATE: 90 BPM | DIASTOLIC BLOOD PRESSURE: 82 MMHG | OXYGEN SATURATION: 98 % | BODY MASS INDEX: 34.25 KG/M2 | SYSTOLIC BLOOD PRESSURE: 122 MMHG | WEIGHT: 226 LBS | HEIGHT: 68 IN | RESPIRATION RATE: 16 BRPM | TEMPERATURE: 97.5 F

## 2024-05-02 DIAGNOSIS — R10.2 PELVIC PAIN: ICD-10-CM

## 2024-05-02 DIAGNOSIS — R50.9 FEVER, UNSPECIFIED FEVER CAUSE: ICD-10-CM

## 2024-05-02 DIAGNOSIS — R31.9 HEMATURIA, UNSPECIFIED TYPE: ICD-10-CM

## 2024-05-02 DIAGNOSIS — R10.30 LOWER ABDOMINAL PAIN: Primary | ICD-10-CM

## 2024-05-02 DIAGNOSIS — R10.30 LOWER ABDOMINAL PAIN: ICD-10-CM

## 2024-05-02 LAB
ALBUMIN SERPL-MCNC: 3.4 G/DL (ref 3.5–5)
ALBUMIN/GLOB SERPL: 0.8 (ref 1–1.9)
ALP SERPL-CCNC: 109 U/L (ref 35–104)
ALT SERPL-CCNC: 11 U/L (ref 12–65)
ANION GAP SERPL CALC-SCNC: 12 MMOL/L (ref 9–18)
AST SERPL-CCNC: 19 U/L (ref 15–37)
BACTERIA URNS QL MICRO: ABNORMAL /HPF
BASOPHILS # BLD: 0 K/UL (ref 0–0.2)
BASOPHILS NFR BLD: 0 % (ref 0–2)
BILIRUB SERPL-MCNC: <0.2 MG/DL (ref 0–1.2)
BILIRUBIN, URINE, POC: NEGATIVE
BLOOD URINE, POC: NORMAL
BUN SERPL-MCNC: 23 MG/DL (ref 6–23)
CALCIUM SERPL-MCNC: 9.7 MG/DL (ref 8.8–10.2)
CASTS URNS QL MICRO: ABNORMAL /LPF
CHLORIDE SERPL-SCNC: 101 MMOL/L (ref 98–107)
CO2 SERPL-SCNC: 27 MMOL/L (ref 20–28)
CREAT SERPL-MCNC: 1.42 MG/DL (ref 0.6–1.1)
CRYSTALS URNS QL MICRO: ABNORMAL /LPF
DIFFERENTIAL METHOD BLD: ABNORMAL
EOSINOPHIL # BLD: 0.1 K/UL (ref 0–0.8)
EOSINOPHIL NFR BLD: 2 % (ref 0.5–7.8)
EPI CELLS #/AREA URNS HPF: ABNORMAL /HPF
ERYTHROCYTE [DISTWIDTH] IN BLOOD BY AUTOMATED COUNT: 16.2 % (ref 11.9–14.6)
GLOBULIN SER CALC-MCNC: 4.5 G/DL (ref 2.3–3.5)
GLUCOSE SERPL-MCNC: 97 MG/DL (ref 70–99)
GLUCOSE URINE, POC: NEGATIVE
HCG QUALITATIVE, POC: NORMAL
HCG, PREGNANCY, URINE, POC: NEGATIVE
HCT VFR BLD AUTO: 42.5 % (ref 35.8–46.3)
HGB BLD-MCNC: 13.2 G/DL (ref 11.7–15.4)
IMM GRANULOCYTES # BLD AUTO: 0 K/UL (ref 0–0.5)
IMM GRANULOCYTES NFR BLD AUTO: 0 % (ref 0–5)
KETONES, URINE, POC: NEGATIVE
LEUKOCYTE ESTERASE, URINE, POC: NEGATIVE
LYMPHOCYTES # BLD: 2 K/UL (ref 0.5–4.6)
LYMPHOCYTES NFR BLD: 27 % (ref 13–44)
MCH RBC QN AUTO: 24.1 PG (ref 26.1–32.9)
MCHC RBC AUTO-ENTMCNC: 31.1 G/DL (ref 31.4–35)
MCV RBC AUTO: 77.7 FL (ref 82–102)
MONOCYTES # BLD: 1.2 K/UL (ref 0.1–1.3)
MONOCYTES NFR BLD: 16 % (ref 4–12)
MUCOUS THREADS URNS QL MICRO: 0 /LPF
NEUTS SEG # BLD: 4.1 K/UL (ref 1.7–8.2)
NEUTS SEG NFR BLD: 55 % (ref 43–78)
NITRITE, URINE, POC: NEGATIVE
NRBC # BLD: 0 K/UL (ref 0–0.2)
OTHER OBSERVATIONS: ABNORMAL
PH, URINE, POC: 5 (ref 4.6–8)
PLATELET # BLD AUTO: 239 K/UL (ref 150–450)
PMV BLD AUTO: 11.9 FL (ref 9.4–12.3)
POTASSIUM SERPL-SCNC: 3.8 MMOL/L (ref 3.5–5.1)
PROT SERPL-MCNC: 7.9 G/DL (ref 6.3–8.2)
PROTEIN,URINE, POC: NEGATIVE
RBC # BLD AUTO: 5.47 M/UL (ref 4.05–5.2)
RBC #/AREA URNS HPF: ABNORMAL /HPF
SODIUM SERPL-SCNC: 139 MMOL/L (ref 136–145)
SPECIFIC GRAVITY, URINE, POC: 1.01 (ref 1–1.03)
URINALYSIS CLARITY, POC: CLEAR
URINALYSIS COLOR, POC: NORMAL
UROBILINOGEN, POC: NORMAL
VALID INTERNAL CONTROL, POC: YES
WBC # BLD AUTO: 7.5 K/UL (ref 4.3–11.1)
WBC URNS QL MICRO: ABNORMAL /HPF

## 2024-05-02 PROCEDURE — 99214 OFFICE O/P EST MOD 30 MIN: CPT | Performed by: FAMILY MEDICINE

## 2024-05-02 PROCEDURE — 74177 CT ABD & PELVIS W/CONTRAST: CPT

## 2024-05-02 PROCEDURE — 81002 URINALYSIS NONAUTO W/O SCOPE: CPT | Performed by: FAMILY MEDICINE

## 2024-05-02 PROCEDURE — 6360000004 HC RX CONTRAST MEDICATION: Performed by: FAMILY MEDICINE

## 2024-05-02 PROCEDURE — 3074F SYST BP LT 130 MM HG: CPT | Performed by: FAMILY MEDICINE

## 2024-05-02 PROCEDURE — 84703 CHORIONIC GONADOTROPIN ASSAY: CPT | Performed by: FAMILY MEDICINE

## 2024-05-02 PROCEDURE — 3079F DIAST BP 80-89 MM HG: CPT | Performed by: FAMILY MEDICINE

## 2024-05-02 RX ADMIN — DIATRIZOATE MEGLUMINE AND DIATRIZOATE SODIUM 15 ML: 660; 100 LIQUID ORAL; RECTAL at 16:49

## 2024-05-02 RX ADMIN — IOPAMIDOL 100 ML: 755 INJECTION, SOLUTION INTRAVENOUS at 16:49

## 2024-05-02 ASSESSMENT — ENCOUNTER SYMPTOMS
VOMITING: 0
ABDOMINAL PAIN: 1
BOWEL INCONTINENCE: 0
DIARRHEA: 0
WHEEZING: 0
NAUSEA: 0
COUGH: 0
SORE THROAT: 0

## 2024-05-02 NOTE — PROGRESS NOTES
HISTORY OF PRESENT ILLNESS  Chief Complaint   Patient presents with    Fever    Lower Back Pain     NOTICE FOR THE PATIENT: This clinical note is not designed to be interpreted by patients.  We do not recommend reading it unless you have medical training. These notes may contain candid and (unintentionally) offensive descriptions, which are sometimes required for accurate documentation. If you would like more information about your healthcare, please obtain it directly by myself or my staff/colleagues - never solely from the notes. Thank you for your understanding and cooperation.     Pain is worse all over the lower part of back.  Felt like could have had a fever and then chills.  Slept since she was last here.  Was up for 4 hours last night and it wore her out.  Fever started Tuesday / Monday night  Previously said, \"   Started a couple of days ago.  About 4 days ago started with uti sxs.     Has had kidney stones in the past and doesn't feel like that.  Gets some relief with heat on the lower back. \"    Dad used to get kidney stones a lot.    Fever   This is a new problem. The current episode started in the past 7 days. Her temperature was unmeasured prior to arrival. Associated symptoms include abdominal pain. Pertinent negatives include no chest pain, congestion, coughing, diarrhea, ear pain, headaches, muscle aches, nausea, rash, sleepiness, sore throat, urinary pain, vomiting or wheezing.   Flank Pain  This is a new problem. Associated symptoms include abdominal pain and a fever. Pertinent negatives include no bladder incontinence, bowel incontinence, chest pain, dysuria, headaches, leg pain, numbness, paresis, paresthesias, pelvic pain, perianal numbness, tingling, weakness or weight loss.   Urinary Frequency   This is a new problem. The current episode started in the past 7 days. There has been no fever. Associated symptoms include chills, flank pain, frequency, hematuria and urgency. Pertinent

## 2024-05-07 LAB
C TRACH RRNA SPEC QL NAA+PROBE: NEGATIVE
N GONORRHOEA RRNA SPEC QL NAA+PROBE: NEGATIVE
SPECIMEN SOURCE: NORMAL

## 2024-05-10 LAB
A VAGINAE DNA VAG QL NAA+PROBE: NORMAL SCORE
BVAB2 DNA VAG QL NAA+PROBE: NORMAL SCORE
MEGA1 DNA VAG QL NAA+PROBE: NORMAL SCORE
SPECIMEN SOURCE: NORMAL

## 2024-05-19 ENCOUNTER — TELEPHONE (OUTPATIENT)
Dept: FAMILY MEDICINE CLINIC | Facility: CLINIC | Age: 34
End: 2024-05-19

## 2024-05-20 NOTE — TELEPHONE ENCOUNTER
It looks like you have not viewed Dr. Del Cid's advice about your test.     Your blood work shows some signs of iron deficiency.  Please make sure you take a multivitamin with iron Daily. Your kidney function is borderline. We will need to keep an eye on that. Make sure you are drinking plenty of fluids.  We will need to keep your blood pressure and blood sugar under control.  Stay away from anti-inflammatories like Advil and Aleve.  Recheck that level with an office visit in 3 months.  All the other labs just show some bacteria in your urine.  Other labs are normal.

## 2024-07-04 DIAGNOSIS — I10 ESSENTIAL HYPERTENSION: ICD-10-CM

## 2024-07-05 RX ORDER — CLONIDINE 0.2 MG/24H
1 PATCH, EXTENDED RELEASE TRANSDERMAL
Qty: 12 PATCH | Refills: 1 | Status: SHIPPED | OUTPATIENT
Start: 2024-07-05 | End: 2025-07-05

## 2024-09-23 DIAGNOSIS — F90.9 ADULT ADHD (ATTENTION DEFICIT HYPERACTIVITY DISORDER): ICD-10-CM

## 2024-09-23 DIAGNOSIS — F33.42 RECURRENT MAJOR DEPRESSIVE DISORDER, IN FULL REMISSION (HCC): ICD-10-CM

## 2024-09-23 RX ORDER — DEXTROAMPHETAMINE SACCHARATE, AMPHETAMINE ASPARTATE, DEXTROAMPHETAMINE SULFATE AND AMPHETAMINE SULFATE 5; 5; 5; 5 MG/1; MG/1; MG/1; MG/1
20 TABLET ORAL 2 TIMES DAILY
Qty: 60 TABLET | Refills: 0 | Status: SHIPPED | OUTPATIENT
Start: 2024-09-23 | End: 2024-10-23

## 2024-09-23 RX ORDER — BUPROPION HYDROCHLORIDE 300 MG/1
300 TABLET ORAL EVERY MORNING
Qty: 90 TABLET | Refills: 1 | Status: SHIPPED | OUTPATIENT
Start: 2024-09-23

## 2024-10-22 ASSESSMENT — ENCOUNTER SYMPTOMS
BLURRED VISION: 0
SHORTNESS OF BREATH: 0
ORTHOPNEA: 0

## 2024-10-23 ENCOUNTER — OFFICE VISIT (OUTPATIENT)
Dept: FAMILY MEDICINE CLINIC | Facility: CLINIC | Age: 34
End: 2024-10-23

## 2024-10-23 VITALS
BODY MASS INDEX: 36.71 KG/M2 | HEIGHT: 68 IN | DIASTOLIC BLOOD PRESSURE: 110 MMHG | TEMPERATURE: 97.5 F | RESPIRATION RATE: 16 BRPM | OXYGEN SATURATION: 99 % | SYSTOLIC BLOOD PRESSURE: 150 MMHG | WEIGHT: 242.2 LBS | HEART RATE: 67 BPM

## 2024-10-23 DIAGNOSIS — L30.1 DYSHIDROTIC ECZEMA: ICD-10-CM

## 2024-10-23 DIAGNOSIS — I10 ESSENTIAL HYPERTENSION: Primary | ICD-10-CM

## 2024-10-23 DIAGNOSIS — F90.9 ADULT ADHD (ATTENTION DEFICIT HYPERACTIVITY DISORDER): ICD-10-CM

## 2024-10-23 PROCEDURE — 3077F SYST BP >= 140 MM HG: CPT | Performed by: FAMILY MEDICINE

## 2024-10-23 PROCEDURE — 99214 OFFICE O/P EST MOD 30 MIN: CPT | Performed by: FAMILY MEDICINE

## 2024-10-23 PROCEDURE — 3080F DIAST BP >= 90 MM HG: CPT | Performed by: FAMILY MEDICINE

## 2024-10-23 RX ORDER — DEXTROAMPHETAMINE SACCHARATE, AMPHETAMINE ASPARTATE, DEXTROAMPHETAMINE SULFATE AND AMPHETAMINE SULFATE 5; 5; 5; 5 MG/1; MG/1; MG/1; MG/1
20 TABLET ORAL 2 TIMES DAILY
Qty: 60 TABLET | Refills: 0 | Status: SHIPPED | OUTPATIENT
Start: 2024-11-22 | End: 2024-12-22

## 2024-10-23 RX ORDER — CLOBETASOL PROPIONATE 0.5 MG/G
OINTMENT TOPICAL
Qty: 60 G | Refills: 2 | Status: SHIPPED | OUTPATIENT
Start: 2024-10-23

## 2024-10-23 RX ORDER — ASPIRIN 81 MG/1
81 TABLET ORAL DAILY
Qty: 100 TABLET | Refills: 3 | Status: SHIPPED | OUTPATIENT
Start: 2024-10-23

## 2024-10-23 RX ORDER — DEXTROAMPHETAMINE SACCHARATE, AMPHETAMINE ASPARTATE, DEXTROAMPHETAMINE SULFATE AND AMPHETAMINE SULFATE 5; 5; 5; 5 MG/1; MG/1; MG/1; MG/1
20 TABLET ORAL 2 TIMES DAILY
Qty: 60 TABLET | Refills: 0 | Status: SHIPPED | OUTPATIENT
Start: 2024-10-23 | End: 2024-11-22

## 2024-10-23 RX ORDER — TELMISARTAN AND HYDROCHLORTHIAZIDE 40; 12.5 MG/1; MG/1
TABLET ORAL
Qty: 90 TABLET | Refills: 1 | Status: SHIPPED | OUTPATIENT
Start: 2024-10-23

## 2024-10-23 RX ORDER — NADOLOL 40 MG/1
40 TABLET ORAL DAILY
Qty: 90 TABLET | Refills: 1 | Status: SHIPPED | OUTPATIENT
Start: 2024-10-23

## 2024-10-23 RX ORDER — TRIAZOLAM 0.25 MG
TABLET ORAL
COMMUNITY
Start: 2024-08-08

## 2024-10-23 RX ORDER — DEXTROAMPHETAMINE SACCHARATE, AMPHETAMINE ASPARTATE, DEXTROAMPHETAMINE SULFATE AND AMPHETAMINE SULFATE 5; 5; 5; 5 MG/1; MG/1; MG/1; MG/1
20 TABLET ORAL 2 TIMES DAILY
Qty: 60 TABLET | Refills: 0 | Status: SHIPPED | OUTPATIENT
Start: 2024-12-22 | End: 2025-01-21

## 2024-10-23 RX ORDER — HYDROCODONE BITARTRATE AND ACETAMINOPHEN 10; 325 MG/1; MG/1
TABLET ORAL
COMMUNITY
Start: 2024-08-22

## 2024-10-23 RX ORDER — AMOXICILLIN 875 MG/1
TABLET, COATED ORAL
COMMUNITY
Start: 2024-08-22

## 2024-10-23 RX ORDER — OLMESARTAN MEDOXOMIL AND HYDROCHLOROTHIAZIDE 20/12.5 20; 12.5 MG/1; MG/1
TABLET ORAL
Qty: 90 TABLET | Refills: 1 | Status: CANCELLED | OUTPATIENT
Start: 2024-10-23

## 2024-10-23 SDOH — ECONOMIC STABILITY: FOOD INSECURITY: WITHIN THE PAST 12 MONTHS, THE FOOD YOU BOUGHT JUST DIDN'T LAST AND YOU DIDN'T HAVE MONEY TO GET MORE.: NEVER TRUE

## 2024-10-23 SDOH — ECONOMIC STABILITY: FOOD INSECURITY: WITHIN THE PAST 12 MONTHS, YOU WORRIED THAT YOUR FOOD WOULD RUN OUT BEFORE YOU GOT MONEY TO BUY MORE.: NEVER TRUE

## 2024-10-23 SDOH — ECONOMIC STABILITY: INCOME INSECURITY: HOW HARD IS IT FOR YOU TO PAY FOR THE VERY BASICS LIKE FOOD, HOUSING, MEDICAL CARE, AND HEATING?: NOT HARD AT ALL

## 2024-10-23 ASSESSMENT — PATIENT HEALTH QUESTIONNAIRE - PHQ9
5. POOR APPETITE OR OVEREATING: NOT AT ALL
2. FEELING DOWN, DEPRESSED OR HOPELESS: NOT AT ALL
3. TROUBLE FALLING OR STAYING ASLEEP: NOT AT ALL
SUM OF ALL RESPONSES TO PHQ QUESTIONS 1-9: 0
SUM OF ALL RESPONSES TO PHQ QUESTIONS 1-9: 0
1. LITTLE INTEREST OR PLEASURE IN DOING THINGS: NOT AT ALL
SUM OF ALL RESPONSES TO PHQ QUESTIONS 1-9: 0
8. MOVING OR SPEAKING SO SLOWLY THAT OTHER PEOPLE COULD HAVE NOTICED. OR THE OPPOSITE, BEING SO FIGETY OR RESTLESS THAT YOU HAVE BEEN MOVING AROUND A LOT MORE THAN USUAL: NOT AT ALL
4. FEELING TIRED OR HAVING LITTLE ENERGY: NOT AT ALL
SUM OF ALL RESPONSES TO PHQ QUESTIONS 1-9: 0
SUM OF ALL RESPONSES TO PHQ9 QUESTIONS 1 & 2: 0
6. FEELING BAD ABOUT YOURSELF - OR THAT YOU ARE A FAILURE OR HAVE LET YOURSELF OR YOUR FAMILY DOWN: NOT AT ALL
9. THOUGHTS THAT YOU WOULD BE BETTER OFF DEAD, OR OF HURTING YOURSELF: NOT AT ALL
10. IF YOU CHECKED OFF ANY PROBLEMS, HOW DIFFICULT HAVE THESE PROBLEMS MADE IT FOR YOU TO DO YOUR WORK, TAKE CARE OF THINGS AT HOME, OR GET ALONG WITH OTHER PEOPLE: NOT DIFFICULT AT ALL
7. TROUBLE CONCENTRATING ON THINGS, SUCH AS READING THE NEWSPAPER OR WATCHING TELEVISION: NOT AT ALL

## 2024-10-23 ASSESSMENT — ENCOUNTER SYMPTOMS
DIARRHEA: 0
WHEEZING: 0
COUGH: 0
RHINORRHEA: 0
CONSTIPATION: 0
NAUSEA: 0
ABDOMINAL PAIN: 0
VOMITING: 0

## 2024-10-23 NOTE — PROGRESS NOTES
depressive disorder, in full remission ([facility])         Blood pressure (!) 163/114, pulse 67, temperature 97.5 °F (36.4 °C), resp. rate 16, height 1.727 m (5' 8\"), weight 109.9 kg (242 lb 3.2 oz), SpO2 99%. Pain Scale: /10 Pain Location:   Body mass index is 36.83 kg/m².   Physical Exam  Vitals and nursing note reviewed.   Constitutional:       General: She is not in acute distress.     Appearance: Normal appearance. She is normal weight. She is not toxic-appearing.   HENT:      Head: Normocephalic and atraumatic.   Eyes:      General: Lids are normal.      Extraocular Movements: Extraocular movements intact.      Conjunctiva/sclera: Conjunctivae normal.      Pupils: Pupils are equal.   Cardiovascular:      Rate and Rhythm: Normal rate and regular rhythm.      Heart sounds: Normal heart sounds. No murmur heard.     No friction rub. No gallop.   Pulmonary:      Effort: Pulmonary effort is normal. No respiratory distress.      Breath sounds: Normal breath sounds. No wheezing or rales.   Musculoskeletal:      Right lower leg: No edema.      Left lower leg: No edema.   Skin:     General: Skin is warm and dry.   Neurological:      General: No focal deficit present.      Mental Status: She is alert.   Psychiatric:         Mood and Affect: Mood normal.         Behavior: Behavior normal.         Thought Content: Thought content normal.         Judgment: Judgment normal.            ASSESSMENT and PLAN  1. Essential hypertension  -     nadolol (CORGARD) 40 MG tablet; Take 1 tablet by mouth daily, Disp-90 tablet, R-1Normal  -     MRA RENAL W WO CONTRAST; Future  -     aspirin 81 MG EC tablet; Take 1 tablet by mouth daily, Disp-100 tablet, R-3Normal  -     telmisartan-hydroCHLOROthiazide (MICARDIS HCT) 40-12.5 MG per tablet; 1 po qam for blood pressure instead of the olmesartan hctz, Disp-90 tablet, R-1Normal  2. Adult ADHD (attention deficit hyperactivity disorder)  -     amphetamine-dextroamphetamine (ADDERALL) 20 MG tablet;

## 2024-12-26 ENCOUNTER — TELEPHONE (OUTPATIENT)
Dept: FAMILY MEDICINE CLINIC | Facility: CLINIC | Age: 34
End: 2024-12-26

## 2024-12-26 NOTE — TELEPHONE ENCOUNTER
Upon review of pending imaging orders, it is noted that no appointment has been scheduled for MRA Renal with and without contrast.  Called patient, she stated no one has tried to schedule this appointment. She is given the  Radiology scheduling phone number to call for this appointment because she has to arrange transportation.

## 2025-01-20 RX ORDER — DEXTROAMPHETAMINE SACCHARATE, AMPHETAMINE ASPARTATE, DEXTROAMPHETAMINE SULFATE AND AMPHETAMINE SULFATE 5; 5; 5; 5 MG/1; MG/1; MG/1; MG/1
20 TABLET ORAL 2 TIMES DAILY
Qty: 60 TABLET | Refills: 0 | Status: CANCELLED | OUTPATIENT
Start: 2025-01-20 | End: 2025-02-19

## 2025-01-21 ENCOUNTER — OFFICE VISIT (OUTPATIENT)
Dept: FAMILY MEDICINE CLINIC | Facility: CLINIC | Age: 35
End: 2025-01-21

## 2025-01-21 VITALS
HEIGHT: 68 IN | SYSTOLIC BLOOD PRESSURE: 125 MMHG | WEIGHT: 249.2 LBS | DIASTOLIC BLOOD PRESSURE: 90 MMHG | HEART RATE: 90 BPM | RESPIRATION RATE: 17 BRPM | BODY MASS INDEX: 37.77 KG/M2 | OXYGEN SATURATION: 99 % | TEMPERATURE: 98.1 F

## 2025-01-21 DIAGNOSIS — I10 ESSENTIAL HYPERTENSION: ICD-10-CM

## 2025-01-21 DIAGNOSIS — F90.9 ADULT ADHD (ATTENTION DEFICIT HYPERACTIVITY DISORDER): ICD-10-CM

## 2025-01-21 PROCEDURE — 99214 OFFICE O/P EST MOD 30 MIN: CPT | Performed by: FAMILY MEDICINE

## 2025-01-21 PROCEDURE — 3074F SYST BP LT 130 MM HG: CPT | Performed by: FAMILY MEDICINE

## 2025-01-21 PROCEDURE — 3080F DIAST BP >= 90 MM HG: CPT | Performed by: FAMILY MEDICINE

## 2025-01-21 RX ORDER — NADOLOL 40 MG/1
40 TABLET ORAL DAILY
Qty: 90 TABLET | Refills: 1 | Status: SHIPPED | OUTPATIENT
Start: 2025-01-21

## 2025-01-21 RX ORDER — DEXTROAMPHETAMINE SACCHARATE, AMPHETAMINE ASPARTATE, DEXTROAMPHETAMINE SULFATE AND AMPHETAMINE SULFATE 5; 5; 5; 5 MG/1; MG/1; MG/1; MG/1
20 TABLET ORAL 2 TIMES DAILY
Qty: 60 TABLET | Refills: 0 | Status: SHIPPED | OUTPATIENT
Start: 2025-02-20 | End: 2025-03-22

## 2025-01-21 RX ORDER — DEXTROAMPHETAMINE SACCHARATE, AMPHETAMINE ASPARTATE, DEXTROAMPHETAMINE SULFATE AND AMPHETAMINE SULFATE 5; 5; 5; 5 MG/1; MG/1; MG/1; MG/1
20 TABLET ORAL 2 TIMES DAILY
Qty: 60 TABLET | Refills: 0 | Status: SHIPPED | OUTPATIENT
Start: 2025-03-22 | End: 2025-04-21

## 2025-01-21 RX ORDER — CLONIDINE 0.2 MG/24H
1 PATCH, EXTENDED RELEASE TRANSDERMAL
Qty: 12 PATCH | Refills: 1 | Status: SHIPPED | OUTPATIENT
Start: 2025-01-21 | End: 2026-01-21

## 2025-01-21 RX ORDER — DEXTROAMPHETAMINE SACCHARATE, AMPHETAMINE ASPARTATE, DEXTROAMPHETAMINE SULFATE AND AMPHETAMINE SULFATE 5; 5; 5; 5 MG/1; MG/1; MG/1; MG/1
20 TABLET ORAL 2 TIMES DAILY
Qty: 60 TABLET | Refills: 0 | Status: SHIPPED | OUTPATIENT
Start: 2025-01-21 | End: 2025-02-20

## 2025-01-21 SDOH — ECONOMIC STABILITY: FOOD INSECURITY: WITHIN THE PAST 12 MONTHS, THE FOOD YOU BOUGHT JUST DIDN'T LAST AND YOU DIDN'T HAVE MONEY TO GET MORE.: NEVER TRUE

## 2025-01-21 SDOH — ECONOMIC STABILITY: FOOD INSECURITY: WITHIN THE PAST 12 MONTHS, YOU WORRIED THAT YOUR FOOD WOULD RUN OUT BEFORE YOU GOT MONEY TO BUY MORE.: NEVER TRUE

## 2025-01-21 ASSESSMENT — PATIENT HEALTH QUESTIONNAIRE - PHQ9
SUM OF ALL RESPONSES TO PHQ9 QUESTIONS 1 & 2: 0
2. FEELING DOWN, DEPRESSED OR HOPELESS: NOT AT ALL
SUM OF ALL RESPONSES TO PHQ QUESTIONS 1-9: 0
4. FEELING TIRED OR HAVING LITTLE ENERGY: NOT AT ALL
5. POOR APPETITE OR OVEREATING: NOT AT ALL
8. MOVING OR SPEAKING SO SLOWLY THAT OTHER PEOPLE COULD HAVE NOTICED. OR THE OPPOSITE, BEING SO FIGETY OR RESTLESS THAT YOU HAVE BEEN MOVING AROUND A LOT MORE THAN USUAL: NOT AT ALL
3. TROUBLE FALLING OR STAYING ASLEEP: NOT AT ALL
1. LITTLE INTEREST OR PLEASURE IN DOING THINGS: NOT AT ALL
7. TROUBLE CONCENTRATING ON THINGS, SUCH AS READING THE NEWSPAPER OR WATCHING TELEVISION: NOT AT ALL
SUM OF ALL RESPONSES TO PHQ QUESTIONS 1-9: 0
9. THOUGHTS THAT YOU WOULD BE BETTER OFF DEAD, OR OF HURTING YOURSELF: NOT AT ALL
SUM OF ALL RESPONSES TO PHQ QUESTIONS 1-9: 0
10. IF YOU CHECKED OFF ANY PROBLEMS, HOW DIFFICULT HAVE THESE PROBLEMS MADE IT FOR YOU TO DO YOUR WORK, TAKE CARE OF THINGS AT HOME, OR GET ALONG WITH OTHER PEOPLE: NOT DIFFICULT AT ALL
SUM OF ALL RESPONSES TO PHQ QUESTIONS 1-9: 0
6. FEELING BAD ABOUT YOURSELF - OR THAT YOU ARE A FAILURE OR HAVE LET YOURSELF OR YOUR FAMILY DOWN: NOT AT ALL

## 2025-01-21 NOTE — PROGRESS NOTES
HISTORY OF PRESENT ILLNESS  Chief Complaint   Patient presents with    Medication Refill     Adderall      NOTICE FOR THE PATIENT: This clinical note is not designed to be interpreted by patients.  We do not recommend reading it unless you have medical training. These notes may contain candid and (unintentionally) offensive descriptions, which are sometimes required for accurate documentation. If you would like more information about your healthcare, please obtain it directly by myself or my staff/colleagues - never solely from the notes. Thank you for your understanding and cooperation.       History of Present Illness  The patient presents for evaluation of blood pressure management, weight management, and medication management.    Her blood pressure readings at home have been consistent with today's measurement, albeit slightly elevated, but lower than previous levels, typically around the 130s range. She has discontinued nadolol, which she previously took in the evening, and is currently on HCTZ. She recalls experiencing adverse effects from olmesartan, including diarrhea that persisted for a week.    She is making efforts to increase her physical activity. She has attempted the ketogenic diet without success.    She is on Adderall 20 mg twice a day and needs a refill.    She has not received her influenza vaccine this year and declines it today.    ALLERGIES  The patient has had an allergic reaction to OLMESARTAN, which caused severe diarrhea.    MEDICATIONS  Current: Adderall, HCTZ.  Discontinued: nadolol, olmesartan.    IMMUNIZATIONS  She has not received her influenza vaccine this year and declines it today.       Subjective:   Beatriz Mccormick is a 34 y.o. female with hypertension.    Hypertension ROS: taking medications as instructed, no medication side effects noted, no TIA's, no chest pain on exertion, no dyspnea on exertion, no swelling of ankles.   Cardiac Medications       Antiadrenergic

## 2025-01-27 ASSESSMENT — ENCOUNTER SYMPTOMS
VOMITING: 0
ABDOMINAL PAIN: 0
WHEEZING: 0
NAUSEA: 0
DIARRHEA: 0
RHINORRHEA: 0
CONSTIPATION: 0
COUGH: 0

## 2025-04-21 DIAGNOSIS — F90.9 ADULT ADHD (ATTENTION DEFICIT HYPERACTIVITY DISORDER): ICD-10-CM

## 2025-04-21 RX ORDER — DEXTROAMPHETAMINE SACCHARATE, AMPHETAMINE ASPARTATE, DEXTROAMPHETAMINE SULFATE AND AMPHETAMINE SULFATE 5; 5; 5; 5 MG/1; MG/1; MG/1; MG/1
20 TABLET ORAL 2 TIMES DAILY
Qty: 60 TABLET | Refills: 0 | Status: SHIPPED | OUTPATIENT
Start: 2025-04-21 | End: 2025-05-21

## 2025-05-06 ASSESSMENT — ENCOUNTER SYMPTOMS
ORTHOPNEA: 0
SHORTNESS OF BREATH: 0
BLURRED VISION: 0

## 2025-05-07 ENCOUNTER — OFFICE VISIT (OUTPATIENT)
Dept: FAMILY MEDICINE CLINIC | Facility: CLINIC | Age: 35
End: 2025-05-07

## 2025-05-07 VITALS
HEIGHT: 68 IN | HEART RATE: 70 BPM | TEMPERATURE: 98.2 F | SYSTOLIC BLOOD PRESSURE: 135 MMHG | BODY MASS INDEX: 39.04 KG/M2 | RESPIRATION RATE: 16 BRPM | WEIGHT: 257.6 LBS | OXYGEN SATURATION: 97 % | DIASTOLIC BLOOD PRESSURE: 89 MMHG

## 2025-05-07 DIAGNOSIS — I10 ESSENTIAL HYPERTENSION: ICD-10-CM

## 2025-05-07 DIAGNOSIS — E78.5 DYSLIPIDEMIA: ICD-10-CM

## 2025-05-07 DIAGNOSIS — F33.42 RECURRENT MAJOR DEPRESSIVE DISORDER, IN FULL REMISSION: ICD-10-CM

## 2025-05-07 DIAGNOSIS — F90.9 ADULT ADHD (ATTENTION DEFICIT HYPERACTIVITY DISORDER): ICD-10-CM

## 2025-05-07 DIAGNOSIS — R63.5 WEIGHT GAIN: ICD-10-CM

## 2025-05-07 DIAGNOSIS — Z79.899 ENCOUNTER FOR LONG-TERM (CURRENT) USE OF MEDICATIONS: ICD-10-CM

## 2025-05-07 DIAGNOSIS — L30.1 DYSHIDROTIC ECZEMA: ICD-10-CM

## 2025-05-07 DIAGNOSIS — I10 ESSENTIAL HYPERTENSION: Primary | ICD-10-CM

## 2025-05-07 DIAGNOSIS — R73.09 ABNORMAL GLUCOSE: ICD-10-CM

## 2025-05-07 LAB
ALBUMIN SERPL-MCNC: 3.5 G/DL (ref 3.5–5)
ALBUMIN/GLOB SERPL: 0.9 (ref 1–1.9)
ALP SERPL-CCNC: 124 U/L (ref 35–104)
ALT SERPL-CCNC: 14 U/L (ref 8–45)
ANION GAP SERPL CALC-SCNC: 10 MMOL/L (ref 7–16)
AST SERPL-CCNC: 22 U/L (ref 15–37)
BASOPHILS # BLD: 0.05 K/UL (ref 0–0.2)
BASOPHILS NFR BLD: 0.5 % (ref 0–2)
BILIRUB SERPL-MCNC: <0.2 MG/DL (ref 0–1.2)
BUN SERPL-MCNC: 24 MG/DL (ref 6–23)
CALCIUM SERPL-MCNC: 9.5 MG/DL (ref 8.8–10.2)
CHLORIDE SERPL-SCNC: 104 MMOL/L (ref 98–107)
CHOLEST SERPL-MCNC: 156 MG/DL (ref 0–200)
CO2 SERPL-SCNC: 26 MMOL/L (ref 20–29)
CREAT SERPL-MCNC: 1.31 MG/DL (ref 0.6–1.1)
CREAT UR-MCNC: 95.5 MG/DL (ref 28–217)
DIFFERENTIAL METHOD BLD: ABNORMAL
EOSINOPHIL # BLD: 0.2 K/UL (ref 0–0.8)
EOSINOPHIL NFR BLD: 2 % (ref 0.5–7.8)
ERYTHROCYTE [DISTWIDTH] IN BLOOD BY AUTOMATED COUNT: 15.9 % (ref 11.9–14.6)
EST. AVERAGE GLUCOSE BLD GHB EST-MCNC: 112 MG/DL
GLOBULIN SER CALC-MCNC: 3.7 G/DL (ref 2.3–3.5)
GLUCOSE SERPL-MCNC: 112 MG/DL (ref 70–99)
HBA1C MFR BLD: 5.5 % (ref 0–5.6)
HCT VFR BLD AUTO: 43 % (ref 35.8–46.3)
HDLC SERPL-MCNC: 53 MG/DL (ref 40–60)
HDLC SERPL: 3 (ref 0–5)
HGB BLD-MCNC: 13.6 G/DL (ref 11.7–15.4)
IMM GRANULOCYTES # BLD AUTO: 0.03 K/UL (ref 0–0.5)
IMM GRANULOCYTES NFR BLD AUTO: 0.3 % (ref 0–5)
LDLC SERPL CALC-MCNC: 82 MG/DL (ref 0–100)
LYMPHOCYTES # BLD: 2.62 K/UL (ref 0.5–4.6)
LYMPHOCYTES NFR BLD: 26.8 % (ref 13–44)
MAGNESIUM SERPL-MCNC: 1.9 MG/DL (ref 1.8–2.4)
MCH RBC QN AUTO: 25.5 PG (ref 26.1–32.9)
MCHC RBC AUTO-ENTMCNC: 31.6 G/DL (ref 31.4–35)
MCV RBC AUTO: 80.7 FL (ref 82–102)
MICROALBUMIN UR-MCNC: 1.68 MG/DL (ref 0–20)
MICROALBUMIN/CREAT UR-RTO: 18 MG/G (ref 0–30)
MONOCYTES # BLD: 0.84 K/UL (ref 0.1–1.3)
MONOCYTES NFR BLD: 8.6 % (ref 4–12)
NEUTS SEG # BLD: 6.05 K/UL (ref 1.7–8.2)
NEUTS SEG NFR BLD: 61.8 % (ref 43–78)
NRBC # BLD: 0 K/UL (ref 0–0.2)
PLATELET # BLD AUTO: 309 K/UL (ref 150–450)
PMV BLD AUTO: 12.2 FL (ref 9.4–12.3)
POTASSIUM SERPL-SCNC: 4.2 MMOL/L (ref 3.5–5.1)
PROT SERPL-MCNC: 7.3 G/DL (ref 6.3–8.2)
RBC # BLD AUTO: 5.33 M/UL (ref 4.05–5.2)
SODIUM SERPL-SCNC: 140 MMOL/L (ref 136–145)
TRIGL SERPL-MCNC: 106 MG/DL (ref 0–150)
TSH, 3RD GENERATION: 0.81 UIU/ML (ref 0.27–4.2)
VLDLC SERPL CALC-MCNC: 21 MG/DL (ref 6–23)
WBC # BLD AUTO: 9.8 K/UL (ref 4.3–11.1)

## 2025-05-07 PROCEDURE — 99214 OFFICE O/P EST MOD 30 MIN: CPT | Performed by: FAMILY MEDICINE

## 2025-05-07 PROCEDURE — 3079F DIAST BP 80-89 MM HG: CPT | Performed by: FAMILY MEDICINE

## 2025-05-07 PROCEDURE — 3075F SYST BP GE 130 - 139MM HG: CPT | Performed by: FAMILY MEDICINE

## 2025-05-07 RX ORDER — DEXTROAMPHETAMINE SACCHARATE, AMPHETAMINE ASPARTATE, DEXTROAMPHETAMINE SULFATE AND AMPHETAMINE SULFATE 5; 5; 5; 5 MG/1; MG/1; MG/1; MG/1
20 TABLET ORAL 2 TIMES DAILY
Qty: 60 TABLET | Refills: 0 | Status: SHIPPED | OUTPATIENT
Start: 2025-07-06 | End: 2025-08-05

## 2025-05-07 RX ORDER — TELMISARTAN AND HYDROCHLORTHIAZIDE 40; 12.5 MG/1; MG/1
TABLET ORAL
Qty: 90 TABLET | Refills: 1 | Status: SHIPPED | OUTPATIENT
Start: 2025-05-07

## 2025-05-07 RX ORDER — BLOOD PRESSURE TEST KIT
KIT MISCELLANEOUS
Qty: 1 KIT | Refills: 0 | Status: SHIPPED | OUTPATIENT
Start: 2025-05-07 | End: 2025-05-07 | Stop reason: SDUPTHER

## 2025-05-07 RX ORDER — NADOLOL 40 MG/1
40 TABLET ORAL DAILY
Qty: 90 TABLET | Refills: 1 | Status: SHIPPED | OUTPATIENT
Start: 2025-05-07

## 2025-05-07 RX ORDER — DEXTROAMPHETAMINE SACCHARATE, AMPHETAMINE ASPARTATE, DEXTROAMPHETAMINE SULFATE AND AMPHETAMINE SULFATE 5; 5; 5; 5 MG/1; MG/1; MG/1; MG/1
20 TABLET ORAL 2 TIMES DAILY
Qty: 60 TABLET | Refills: 0 | Status: SHIPPED | OUTPATIENT
Start: 2025-05-07 | End: 2025-06-06

## 2025-05-07 RX ORDER — MEDICAL SUPPLY, MISCELLANEOUS
EACH MISCELLANEOUS
Refills: 0 | OUTPATIENT
Start: 2025-05-07

## 2025-05-07 RX ORDER — CLONIDINE 0.2 MG/24H
1 PATCH, EXTENDED RELEASE TRANSDERMAL
Qty: 12 PATCH | Refills: 1 | Status: SHIPPED | OUTPATIENT
Start: 2025-05-07 | End: 2026-05-07

## 2025-05-07 RX ORDER — BUPROPION HYDROCHLORIDE 300 MG/1
300 TABLET ORAL EVERY MORNING
Qty: 90 TABLET | Refills: 1 | Status: SHIPPED | OUTPATIENT
Start: 2025-05-07

## 2025-05-07 RX ORDER — DEXTROAMPHETAMINE SACCHARATE, AMPHETAMINE ASPARTATE, DEXTROAMPHETAMINE SULFATE AND AMPHETAMINE SULFATE 5; 5; 5; 5 MG/1; MG/1; MG/1; MG/1
20 TABLET ORAL 2 TIMES DAILY
Qty: 60 TABLET | Refills: 0 | Status: SHIPPED | OUTPATIENT
Start: 2025-06-06 | End: 2025-07-06

## 2025-05-07 RX ORDER — BLOOD PRESSURE TEST KIT
KIT MISCELLANEOUS
Qty: 1 KIT | Refills: 0 | Status: SHIPPED | OUTPATIENT
Start: 2025-05-07

## 2025-05-07 ASSESSMENT — ENCOUNTER SYMPTOMS
VOMITING: 0
COUGH: 0
WHEEZING: 0
ABDOMINAL PAIN: 0
RHINORRHEA: 0
DIARRHEA: 0
NAUSEA: 0
CONSTIPATION: 0

## 2025-05-07 NOTE — PROGRESS NOTES
HISTORY OF PRESENT ILLNESS  Chief Complaint   Patient presents with    Hypertension    Depression     NOTICE FOR THE PATIENT: This clinical note is not designed to be interpreted by patients.  We do not recommend reading it unless you have medical training. These notes may contain candid and (unintentionally) offensive descriptions, which are sometimes required for accurate documentation. If you would like more information about your healthcare, please obtain it directly by myself or my staff/colleagues - never solely from the notes. Thank you for your understanding and cooperation.       Has been working on her weight.  Loose keto and cut out added sugars.  Doing a lot of walking and playing pickle ball.    Subjective:   Beatriz Mccormick is a 34 y.o. female with hypertension.    Hypertension ROS: taking medications as instructed, no medication side effects noted, no TIA's, no chest pain on exertion, no dyspnea on exertion, no swelling of ankles.   Cardiac Medications       Antiadrenergic Antihypertensives       cloNIDine (CATAPRES) 0.2 MG/24HR PTWK Place 1 patch onto the skin every 7 days       Antihypertensive Combinations       telmisartan-hydroCHLOROthiazide (MICARDIS HCT) 40-12.5 MG per tablet 1 po qam for blood pressure instead of the olmesartan hctz       Beta Blockers Non-Selective       nadolol (CORGARD) 40 MG tablet Take 1 tablet by mouth daily           Lab Results   Component Value Date/Time     05/02/2024 03:01 PM    K 3.8 05/02/2024 03:01 PM     05/02/2024 03:01 PM    CO2 27 05/02/2024 03:01 PM    BUN 23 05/02/2024 03:01 PM    CREATININE 1.42 05/02/2024 03:01 PM    GLUCOSE 97 05/02/2024 03:01 PM    CALCIUM 9.7 05/02/2024 03:01 PM       Lab Results   Component Value Date    CREATININE 1.42 (H) 05/02/2024      No results found for: \"CHOL\"  No results found for: \"TRIG\"  No results found for: \"HDL\"  No results found for: \"LDL\", \"LDLDIRECT\"   No components found for:

## 2025-05-07 NOTE — TELEPHONE ENCOUNTER
Prescription (s) refilled  It (they) has been escribed to the pharmacy.    Requested Prescriptions     Signed Prescriptions Disp Refills    Blood Pressure KIT 1 kit 0     Sig: Use to monitor blood pressure daily     Authorizing Provider: WHIT GALVEZ     Refused Prescriptions Disp Refills    Blood Pressure Monitoring (ESSENTIAL BP MONITOR/ARM/SMALL) CONCHITA [Pharmacy Med Name: BLOOD PRESSURE MONITOR]  0     Sig: USE TO MONITOR BLOOD PRESSURE DAILY     Refused By: WHIT GALVEZ     Reason for Refusal: Other     No orders of the defined types were placed in this encounter.          ICD-10-CM    1. Essential hypertension  I10 Blood Pressure KIT

## 2025-05-13 ENCOUNTER — RESULTS FOLLOW-UP (OUTPATIENT)
Dept: FAMILY MEDICINE CLINIC | Facility: CLINIC | Age: 35
End: 2025-05-13

## 2025-05-13 NOTE — RESULT ENCOUNTER NOTE
Your blood sugar is slightly elevated.    Your kidney function is borderline.  We will need to keep an eye on that.  Make sure you are drinking plenty of fluids.  We will need to keep your blood pressure and blood sugar under control.  Stay away from anti-inflammatories like Advil and Aleve.  Your blood work shows some evidence of iron deficiency.  Start on iron supplement daily with a stool softener. That is because oral iron can cause constipation. Iron is absorbed better if you take it with a meal that has meat in it.  Avoid taking it with coffee or tea as those things will delay absorption.  Your urine test does not show any elevation of microscopic protein in your urine.   We test for that because it can be an early sign of kidney damage from high blood pressure or diabetes.
Statement Selected

## 2025-06-26 ENCOUNTER — PATIENT MESSAGE (OUTPATIENT)
Dept: FAMILY MEDICINE CLINIC | Facility: CLINIC | Age: 35
End: 2025-06-26

## 2025-06-26 DIAGNOSIS — Z72.51 UNPROTECTED SEXUAL INTERCOURSE: Primary | ICD-10-CM

## 2025-06-27 NOTE — TELEPHONE ENCOUNTER
Prescription (s) refilled  It (they) has been escribed to the pharmacy.    Requested Prescriptions     Signed Prescriptions Disp Refills    ulipristal acetate (JOSE) 30 MG TABS 1 tablet 1     Sig: Take 1 dose ASAP after unprotected intercourse ( within 5 days)     Authorizing Provider: WHIT GALVEZ     No orders of the defined types were placed in this encounter.          ICD-10-CM    1. Unprotected sexual intercourse  Z72.51 ulipristal acetate (JOSE) 30 MG TABS

## 2025-09-02 ENCOUNTER — E-VISIT (OUTPATIENT)
Dept: FAMILY MEDICINE CLINIC | Facility: CLINIC | Age: 35
End: 2025-09-02

## 2025-09-02 DIAGNOSIS — F90.9 ADULT ADHD (ATTENTION DEFICIT HYPERACTIVITY DISORDER): Primary | ICD-10-CM

## 2025-09-02 PROCEDURE — 99421 OL DIG E/M SVC 5-10 MIN: CPT | Performed by: FAMILY MEDICINE

## 2025-09-03 RX ORDER — DEXTROAMPHETAMINE SACCHARATE, AMPHETAMINE ASPARTATE, DEXTROAMPHETAMINE SULFATE AND AMPHETAMINE SULFATE 5; 5; 5; 5 MG/1; MG/1; MG/1; MG/1
20 TABLET ORAL 2 TIMES DAILY
Qty: 60 TABLET | Refills: 0 | Status: SHIPPED | OUTPATIENT
Start: 2025-11-02 | End: 2025-12-02

## 2025-09-03 RX ORDER — DEXTROAMPHETAMINE SACCHARATE, AMPHETAMINE ASPARTATE, DEXTROAMPHETAMINE SULFATE AND AMPHETAMINE SULFATE 5; 5; 5; 5 MG/1; MG/1; MG/1; MG/1
20 TABLET ORAL 2 TIMES DAILY
Qty: 60 TABLET | Refills: 0 | Status: SHIPPED | OUTPATIENT
Start: 2025-09-03 | End: 2025-10-03

## 2025-09-03 RX ORDER — DEXTROAMPHETAMINE SACCHARATE, AMPHETAMINE ASPARTATE, DEXTROAMPHETAMINE SULFATE AND AMPHETAMINE SULFATE 5; 5; 5; 5 MG/1; MG/1; MG/1; MG/1
20 TABLET ORAL 2 TIMES DAILY
Qty: 60 TABLET | Refills: 0 | Status: SHIPPED | OUTPATIENT
Start: 2025-10-03 | End: 2025-11-02

## (undated) DEVICE — SCREW FIX L200MM DIA5MM THRD L80MM S STL SELF DRL SCHNZ

## (undated) DEVICE — BIT DRL 230/200MM CALIB DIA2.5MM 3 FLUT QUIK CPL

## (undated) DEVICE — STERILE POLYISOPRENE POWDER-FREE SURGICAL GLOVES: Brand: PROTEXIS

## (undated) DEVICE — BIT DRL L125MM DIA2MM QUIK CPL W/O STP REUSE

## (undated) DEVICE — SOLUTION IV 1000ML 0.9% SOD CHL

## (undated) DEVICE — AMD ANTIMICROBIAL GAUZE SPONGES,12 PLY USP TYPE VII, 0.2% POLYHEXAMETHYLENE BIGUANIDE HCI (PHMB): Brand: CURITY

## (undated) DEVICE — KIT ARMOR C DRP COLLAPSIBLE AND SELF EXP TOP CVR FOR FLUOROSCOPIC

## (undated) DEVICE — CLAMP EXT FIX L6IN TI ALLY MULTIPIN 6 POS

## (undated) DEVICE — CLAMP EXT FIX L MULTIPIN 4 POS

## (undated) DEVICE — (D)PREP SKN CHLRAPRP APPL 26ML -- CONVERT TO ITEM 371833

## (undated) DEVICE — 7 DAY SILVER-COATED ANTIMICROBIAL BARRIER DRESSING: Brand: ACTICOAT 7  4" X 5"

## (undated) DEVICE — GUIDEWIRE ORTH L150MM DIA1.25MM S STL THRD FOR 4MM CANN SCR

## (undated) DEVICE — LOWER EXTREMITY: Brand: MEDLINE INDUSTRIES, INC.

## (undated) DEVICE — Device

## (undated) DEVICE — KENDALL SCD EXPRESS SLEEVES, KNEE LENGTH, MEDIUM: Brand: KENDALL SCD

## (undated) DEVICE — SUTURE VCRL SZ 1 L27IN ABSRB UD CT-1 L36MM 1/2 CIR J261H

## (undated) DEVICE — GLOVE SURG SZ 7 L12IN FNGR THK79MIL GRN LTX FREE

## (undated) DEVICE — APPLICATOR BNDG 1MM ADH PREMIERPRO EXOFIN

## (undated) DEVICE — INTENDED FOR TISSUE SEPARATION, AND OTHER PROCEDURES THAT REQUIRE A SHARP SURGICAL BLADE TO PUNCTURE OR CUT.: Brand: BARD-PARKER ® STAINLESS STEEL BLADES

## (undated) DEVICE — PAD,ABDOMINAL,5"X9",ST,LF,25/BX: Brand: MEDLINE INDUSTRIES, INC.

## (undated) DEVICE — SUTURE MCRYL SZ 2-0 L27IN ABSRB UD SH L26MM TAPERPOINT NDL Y417H

## (undated) DEVICE — SUTURE PDS II SZ 0 L27IN ABSRB VLT L36MM CT-1 1/2 CIR Z340H

## (undated) DEVICE — CATH FOL TY IC BAG 16FR 2000ML -- CONVERT TO ITEM 363158

## (undated) DEVICE — AMD ANTIMICROBIAL NON-ADHERENT PAD,0.2% POLYHEXAMETHYLENE BIGUANIDE HCI (PHMB): Brand: TELFA

## (undated) DEVICE — SUTURE VCRL SZ 4-0 L27IN ABSRB UD L60MM KS STR REV CUT NDL J662H

## (undated) DEVICE — GLOVE SURG SZ 8 L12IN FNGR THK79MIL GRN LTX FREE

## (undated) DEVICE — Device: Brand: PORTEX

## (undated) DEVICE — SOLUTION IRRIG 1000ML H2O STRL BLT

## (undated) DEVICE — SURGICAL PROCEDURE PACK C SECT CDS

## (undated) DEVICE — MEDI-VAC NON-CONDUCTIVE SUCTION TUBING: Brand: CARDINAL HEALTH

## (undated) DEVICE — SUTURE 2-0 L36IN ABSRB BRN CT L40MM 1/2 CIR TAPERPOINT SGL 913H

## (undated) DEVICE — BIT DRL L160MM DIA2.7MM CANN QUIK CPL ADJ STP REUSE FOR

## (undated) DEVICE — SUTURE PLN GUT SZ 2-0 L27IN ABSRB YELLOWISH TAN L40MM CT 853H

## (undated) DEVICE — ZIMMER® STERILE DISPOSABLE TOURNIQUET CUFF WITH PLC, DUAL PORT, SINGLE BLADDER, 30 IN. (76 CM)

## (undated) DEVICE — PENCIL ES L3M BTTN SWCH S STL HEX LOK BLDE ELECTRD HOLSTER

## (undated) DEVICE — 3M™ STERI-STRIP™ REINFORCED ADHESIVE SKIN CLOSURES, R1547, 1/2 IN X 4 IN (12 MM X 100 MM), 6 STRIPS/ENVELOPE: Brand: 3M™ STERI-STRIP™

## (undated) DEVICE — REM POLYHESIVE ADULT PATIENT RETURN ELECTRODE: Brand: VALLEYLAB

## (undated) DEVICE — SUT CHRMC 1 36IN CTX BRN --